# Patient Record
Sex: MALE | Race: OTHER | NOT HISPANIC OR LATINO
[De-identification: names, ages, dates, MRNs, and addresses within clinical notes are randomized per-mention and may not be internally consistent; named-entity substitution may affect disease eponyms.]

---

## 2019-11-04 PROBLEM — Z00.00 ENCOUNTER FOR PREVENTIVE HEALTH EXAMINATION: Status: ACTIVE | Noted: 2019-11-04

## 2019-11-12 ENCOUNTER — APPOINTMENT (OUTPATIENT)
Dept: NEPHROLOGY | Facility: CLINIC | Age: 74
End: 2019-11-12
Payer: MEDICARE

## 2019-11-12 VITALS
RESPIRATION RATE: 16 BRPM | OXYGEN SATURATION: 98 % | HEART RATE: 60 BPM | WEIGHT: 182 LBS | DIASTOLIC BLOOD PRESSURE: 68 MMHG | HEIGHT: 67.5 IN | BODY MASS INDEX: 28.23 KG/M2 | SYSTOLIC BLOOD PRESSURE: 138 MMHG

## 2019-11-12 DIAGNOSIS — N18.3 CHRONIC KIDNEY DISEASE, STAGE 3 (MODERATE): ICD-10-CM

## 2019-11-12 DIAGNOSIS — R80.9 PROTEINURIA, UNSPECIFIED: ICD-10-CM

## 2019-11-12 DIAGNOSIS — I10 ESSENTIAL (PRIMARY) HYPERTENSION: ICD-10-CM

## 2019-11-12 DIAGNOSIS — E11.9 TYPE 2 DIABETES MELLITUS W/OUT COMPLICATIONS: ICD-10-CM

## 2019-11-12 DIAGNOSIS — I38 ENDOCARDITIS, VALVE UNSPECIFIED: ICD-10-CM

## 2019-11-12 DIAGNOSIS — E78.5 HYPERLIPIDEMIA, UNSPECIFIED: ICD-10-CM

## 2019-11-12 PROCEDURE — 99205 OFFICE O/P NEW HI 60 MIN: CPT

## 2019-11-12 RX ORDER — CHROMIUM 200 MCG
1000 TABLET ORAL DAILY
Refills: 0 | Status: ACTIVE | COMMUNITY

## 2019-11-12 NOTE — CONSULT LETTER
[Dear  ___] : Dear  [unfilled], [Consult Letter:] : I had the pleasure of evaluating your patient, [unfilled]. [Consult Closing:] : Thank you very much for allowing me to participate in the care of this patient.  If you have any questions, please do not hesitate to contact me. [Please see my note below.] : Please see my note below. [Sincerely,] : Sincerely, [FreeTextEntry3] : Delroy [DrKaylee  ___] : Dr. MONAE

## 2019-11-12 NOTE — PHYSICAL EXAM
[General Appearance - Alert] : alert [General Appearance - In No Acute Distress] : in no acute distress [Sclera] : the sclera and conjunctiva were normal [Extraocular Movements] : extraocular movements were intact [Neck Appearance] : the appearance of the neck was normal [] : no respiratory distress [Respiration, Rhythm And Depth] : normal respiratory rhythm and effort [Exaggerated Use Of Accessory Muscles For Inspiration] : no accessory muscle use [Auscultation Breath Sounds / Voice Sounds] : lungs were clear to auscultation bilaterally [Heart Rate And Rhythm] : heart rate was normal and rhythm regular [Heart Sounds Gallop] : no gallops [Heart Sounds Pericardial Friction Rub] : no pericardial rub [Heart Sounds] : normal S1 and S2 [Full Pulse] : the pedal pulses are present [Veins - Varicosity Changes] : there were no varicosital changes [Edema] : there was no peripheral edema [Abdomen Soft] : soft [FreeTextEntry1] : bilateral radial pulses 2+ with equal timing [Bowel Sounds] : normal bowel sounds [No CVA Tenderness] : no ~M costovertebral angle tenderness [Abdomen Tenderness] : non-tender [No Spinal Tenderness] : no spinal tenderness [Abnormal Walk] : normal gait [Nail Clubbing] : no clubbing  or cyanosis of the fingernails [Involuntary Movements] : no involuntary movements were seen [Skin Color & Pigmentation] : normal skin color and pigmentation [Skin Turgor] : normal skin turgor [No Focal Deficits] : no focal deficits [Oriented To Time, Place, And Person] : oriented to person, place, and time [Impaired Insight] : insight and judgment were intact [Affect] : the affect was normal [Mood] : the mood was normal

## 2019-11-12 NOTE — ASSESSMENT
[FreeTextEntry1] : Robbie Juarez is a 74-year old male currently referred for the following BUN/creatinine trend: 38/1.4 (07/12/2016),  43/1.3 (01/03/2017), 40/1.4 (07/18/2017), 47/1.5 (11/16/2017), 46/1.6 (09/06/2018), 47/1.6 (04/25/2019), 55/1.8 (10/30/2019).  He has a past medical history significant for hypertension, diabetes mellitus, dyslipidemia, and paroxysmal atrial fibrillation.  He developed endocarditis in January 2017 which "evolved from a dental infection".  It was at that time that he was diagnosed with diabetes mellitus and started on Metformin.  He takes lisinopril and has done so for on the order of 5-6 years.  \par \par IMPRESSION:\par \par (1) Stage III CKD versus the effects of medication and obesity.   The urinalysis of 10/30/2019 was normal as was the urine microalbumin to creatinine ratio.  I can not rule out an 'age' component. \par (2) Proteinuria.  Mr. Juarez had microalbuminuria in the past.  It is controlled with an ACE inhibitor.  Unclear if this was related to the diabetes mellitus, hypertension, or obesity.\par (3) Hypertension.  Mildly greater than goal. There might be a white coat component.\par (4) Overweight.  \par (5) Diabetes mellitus. The recent HbA1c was 7.2%. \par (6) Mild hyperkalemia.\par \par RECOMMENDATIONS:\par \par (1) Renal ultrasound.\par (2) Weight loss. \par (3) Okay to continue lisinopril.\par (4) Given that the eGFR (an underestimation of the true GFR) is > 30 cc per minute (41 cc per minute), it is alright to continue the Metformin.\par (5) We talked about ways to protect the kidneys:\par -Good blood pressure control\par -Avoid the use of NSAIDS (ibuprofen, Motrin, Aleve,Celebrex, etc.).  Okay to use Tylenol.\par -Avoid salt 'like the plague'.  Limit sodium intake.  Do not add salt to your food.\par -Limit the intake of animal products.  Avoid high animal protein diets.\par -Stay well hydrated.\par -Good cholesterol control.\par -Good diabetic control\par (6) Dr. Lowe- please repeat a BMP in around 2-3 months. \par (7) Return in 3 months with the repeat lab studies.

## 2019-11-12 NOTE — HISTORY OF PRESENT ILLNESS
[FreeTextEntry1] : Robbie Juarez is a 74-year old male currently referred for the following BUN/creatinine trend: 38/1.4 (07/12/2016),  43/1.3 (01/03/2017), 40/1.4 (07/18/2017), 47/1.5 (11/16/2017), 46/1.6 (09/06/2018), 47/1.6 (04/25/2019), 55/1.8 (10/30/2019).  He has a past medical history significant for hypertension, diabetes mellitus, dyslipidemia, and paroxysmal atrial fibrillation.  He developed endocarditis in January 2017 which "evolved from a dental infection".  It was at that time that he was diagnosed with diabetes mellitus and started on Metformin.  He takes lisinopril and has done so for on the order of 5-6 years.  Overall, he feels quite well.  He exercises on a regular basis (Elliptical, weights, biking).

## 2019-11-20 ENCOUNTER — TRANSCRIPTION ENCOUNTER (OUTPATIENT)
Age: 74
End: 2019-11-20

## 2019-11-26 ENCOUNTER — TRANSCRIPTION ENCOUNTER (OUTPATIENT)
Age: 74
End: 2019-11-26

## 2019-12-05 ENCOUNTER — TRANSCRIPTION ENCOUNTER (OUTPATIENT)
Age: 74
End: 2019-12-05

## 2020-04-20 ENCOUNTER — APPOINTMENT (OUTPATIENT)
Dept: NEPHROLOGY | Facility: CLINIC | Age: 75
End: 2020-04-20

## 2020-09-18 DIAGNOSIS — Z78.9 OTHER SPECIFIED HEALTH STATUS: ICD-10-CM

## 2020-09-18 DIAGNOSIS — Z86.79 PERSONAL HISTORY OF OTHER DISEASES OF THE CIRCULATORY SYSTEM: ICD-10-CM

## 2020-09-18 DIAGNOSIS — E78.5 HYPERLIPIDEMIA, UNSPECIFIED: ICD-10-CM

## 2020-09-23 ENCOUNTER — APPOINTMENT (OUTPATIENT)
Dept: CARDIOTHORACIC SURGERY | Facility: CLINIC | Age: 75
End: 2020-09-23
Payer: MEDICARE

## 2020-09-23 VITALS
BODY MASS INDEX: 28.41 KG/M2 | RESPIRATION RATE: 16 BRPM | DIASTOLIC BLOOD PRESSURE: 65 MMHG | HEIGHT: 67 IN | TEMPERATURE: 96.4 F | HEART RATE: 57 BPM | OXYGEN SATURATION: 94 % | WEIGHT: 181 LBS | SYSTOLIC BLOOD PRESSURE: 149 MMHG

## 2020-09-23 PROCEDURE — 99205 OFFICE O/P NEW HI 60 MIN: CPT

## 2020-10-01 NOTE — PROCEDURE
[FreeTextEntry1] : Dr. Chapman reviewed the indications for surgery, and used our webpage [email or web address] <[email or web address].org> to illustrate the aorta and anatomy of the heart. Those indications are the following: size greater than 5.0 cm, symptomatic aneurysms, family history of aortic dissection or aneurysm death with a size greater than 4.5 cm, other necessary cardiac procedures such as coronary artery bypass grafting or valvular disorders with an aneurysm greater than 4.5 cm, or connective tissue disorders with an aneurysm size greater than 4.5 cm. The patient does not meet size criteria for surgical intervention at the time.\par \par Dr. Chapman discussed activity restrictions with the patient, and would advise exercise at a moderate amount with no heavy lifting over one third of body weight, and avoiding heart rates that exceed 140 beats per minute. In addition, every patient should abstain from tobacco abuse and to avoid all illicit drug use, especially stimulants such as cocaine or methamphetamine. Dr. Chapman also counseled regarding maintaining a healthy heart diet, and losing any excessive weight as this also put undue stress on both the aorta and entire cardiovascular system. First degree family members should be screened for bicuspid valve disease, and ascending aortic aneurysms. \par \par Patient was advised to view the educational video prior to this visit regarding aortic pathology, risk factors, surgical procedures, and lifestyle modifications. Video can be retrieved at [email or web address].[email or web address]/watch?[email or web address]=[email or web address].\par

## 2020-10-01 NOTE — CONSULT LETTER
[FreeTextEntry2] : Dr. Mukul Morales\par [address] [address] [FreeTextEntry1] : I had the pleasure of seeing your patient, BIBI SWEET, in my office today. We take a multidisciplinary team approach to patient care and consider you, the referring physician, an extension of our team. We will maintain an open line of communication with you throughout your patient's treatment course.  \par \par He is being evaluated for mitral and aortic insufficiency. I have reviewed all of the medical records and diagnostic images at the time of his office visit. I have enclosed a copy for your records. \par \par I have reviewed the indications for surgery,and used our webpage [email or web address] <[email or web address].org> to illustrate the aorta and anatomy of the heart. The patient does not meet criteria for surgical intervention at the time. Therefore, I have recommended that the patient will require a follow up appointment in 3 months with echo to monitor his valvular pathology. My office will assist the patient with his upcoming appointment and I will update you on his progress at that time.\par \par I have discussed with the patient that we will continue to monitor his aortic pathology closely at the [facility] at [facility] that encompasses the entire health care system and is one of the largest in the nation at this point.\par \par It is our commitment to provide your patient with the highest quality of advanced therapeutic options. On behalf of the Cardiothoracic Surgery Team, thank you for sending your patient to the [facility] based at [facility].  If there are any questions or concerns, please call me directly at [phone].   [FreeTextEntry3] : \par Dakota Chapman M.D.\par Professor of Cardiovascular and Thoracic Surgery\par Minimally Invasive Valve Surgeon\par Director of Aortic Surgery, [facility]\par Cell: [phone]\par Email: [email or web address] \par \par [facility]:\par [address], [address] Floor, [place] [address]\par Office: [phone]\par Fax: [phone]\par \par [facility]:\par Department of Cardiovascular and Thoracic Surgery\par [address] [place] [address], [address]\par Office: [phone]\par Fax: [phone]\par \par Practice Manager: Ms. Jossie Coughlin\par Email: [email or web address]\par Phone: [phone]\par \par

## 2020-10-01 NOTE — PHYSICAL EXAM
[General Appearance - Alert] : alert [General Appearance - In No Acute Distress] : in no acute distress [Sclera] : the sclera and conjunctiva were normal [PERRL With Normal Accommodation] : pupils were equal in size, round, and reactive to light [Extraocular Movements] : extraocular movements were intact [Outer Ear] : the ears and nose were normal in appearance [Oropharynx] : the oropharynx was normal [Neck Appearance] : the appearance of the neck was normal [Neck Cervical Mass (___cm)] : no neck mass was observed [Jugular Venous Distention Increased] : there was no jugular-venous distention [Thyroid Diffuse Enlargement] : the thyroid was not enlarged [Thyroid Nodule] : there were no palpable thyroid nodules [Auscultation Breath Sounds / Voice Sounds] : lungs were clear to auscultation bilaterally [Normal Rate] : normal [Rhythm Regular] : regular [Normal S1] : normal S1 [Normal S2] : normal S2 [I] : a grade 1 [II] : a grade 2 [Examination Of The Chest] : the chest was normal in appearance [Chest Visual Inspection Thoracic Asymmetry] : no chest asymmetry [Diminished Respiratory Excursion] : normal chest expansion [No Abnormalities] : the abdominal aorta was not enlarged and no bruit was heard [Bowel Sounds] : normal bowel sounds [Abdomen Soft] : soft [Abdomen Tenderness] : non-tender [Abdomen Mass (___ Cm)] : no abdominal mass palpated [Cervical Lymph Nodes Enlarged Posterior Bilaterally] : posterior cervical [No CVA Tenderness] : no ~M costovertebral angle tenderness [No Spinal Tenderness] : no spinal tenderness [Abnormal Walk] : normal gait [Nail Clubbing] : no clubbing  or cyanosis of the fingernails [Musculoskeletal - Swelling] : no joint swelling seen [Motor Tone] : muscle strength and tone were normal [Skin Color & Pigmentation] : normal skin color and pigmentation [Skin Turgor] : normal skin turgor [] : no rash [Sensation] : the sensory exam was normal to light touch and pinprick [No Focal Deficits] : no focal deficits [Oriented To Time, Place, And Person] : oriented to person, place, and time [Impaired Insight] : insight and judgment were intact [Affect] : the affect was normal [FreeTextEntry1] : deferred

## 2020-10-01 NOTE — HISTORY OF PRESENT ILLNESS
[FreeTextEntry1] : 75 year old male with history of HTN, HLD, A fib on Xarelto (for four years), DM, CKD (Cr 1.3),  and bacterial endocarditis 2015 treated with IV antibiotics, presents for evaluation and management of mitral regurgitation and aortic insufficiency. Patient is referred by Dr. Mukul Morales. \par \par Repeat Echo 9/16/20: LVDd 5.4 EF 64%. Moderate to severe AR. AVpeak/mean 16/6.70mmHg. moderate MR. moderate TR. \par \par Echo 6/10/20: EF 60%, aortic valve trileaflet thickened and calcified with no AS, moderate to severe AR, moderate MR. LVDd 5.4\par \par Patient is doing well and denies recent hospitalization, ER visits, or surgeries. He  denies fever, chills, fatigue, headache, blurred vision, dizziness, syncope, chest pain, palpitations, shortness of breath, orthopnea, paroxysmal nocturnal dyspnea, nausea, vomiting, abdominal pain, back pain, BRBPR or swelling to legs. He is active and bikes 25miles/day. \par

## 2020-10-01 NOTE — ASSESSMENT
[FreeTextEntry1] : 75 year old male with history of HTN, HLD, A fib on Xarelto (for four years), DM, CKD (Cr 1.3),  and bacterial endocarditis 2015 treated with IV antibiotics, presents for evaluation and management of mitral regurgitation and aortic insufficiency. \par \par Repeat Echo 9/16/20: LVDd 5.4 EF 64%. Moderate to severe AR. AVpeak/mean 16/6.70mmHg. moderate MR. moderate TR. \par \par The patient's medical records and diagnostic images were reviewed at the time of this office visit, and the following recommendation was made. Patient is currently physically active and without symptoms. His EF is normal. although LV has dilated compared to last year, it  remains WNL. I have recommended the patient to follow up in 3 months with repeat echocardiogram. \par \par Plan:\par 1. Continue medication regimen\par 2. Follow up with PCP and cardiologist\par 3. BP control- I have recommended the patient to monitor his blood pressure closely. I have also advised the patient to take daily blood pressures at home and adhere to medication regimen.\par 4. Return to the [facility] in three months with echo. \par

## 2020-10-01 NOTE — DATA REVIEWED
[FreeTextEntry1] : As noted in HPI.\par \par \par Echo 4/29/19:\par moderate AR. mild MR. mild TR. LVDd 3.80 EF normal.

## 2020-11-09 ENCOUNTER — NON-APPOINTMENT (OUTPATIENT)
Age: 75
End: 2020-11-09

## 2021-01-25 ENCOUNTER — NON-APPOINTMENT (OUTPATIENT)
Age: 76
End: 2021-01-25

## 2021-01-27 ENCOUNTER — APPOINTMENT (OUTPATIENT)
Dept: CARDIOTHORACIC SURGERY | Facility: CLINIC | Age: 76
End: 2021-01-27

## 2021-02-17 ENCOUNTER — APPOINTMENT (OUTPATIENT)
Dept: CARDIOTHORACIC SURGERY | Facility: CLINIC | Age: 76
End: 2021-02-17

## 2021-03-08 ENCOUNTER — NON-APPOINTMENT (OUTPATIENT)
Age: 76
End: 2021-03-08

## 2021-06-16 ENCOUNTER — APPOINTMENT (OUTPATIENT)
Dept: CARDIOTHORACIC SURGERY | Facility: CLINIC | Age: 76
End: 2021-06-16
Payer: MEDICARE

## 2021-06-16 VITALS
SYSTOLIC BLOOD PRESSURE: 158 MMHG | DIASTOLIC BLOOD PRESSURE: 63 MMHG | RESPIRATION RATE: 17 BRPM | BODY MASS INDEX: 28.25 KG/M2 | HEART RATE: 54 BPM | WEIGHT: 180 LBS | OXYGEN SATURATION: 95 % | HEIGHT: 67 IN | TEMPERATURE: 96.6 F

## 2021-06-16 VITALS
WEIGHT: 180 LBS | RESPIRATION RATE: 17 BRPM | SYSTOLIC BLOOD PRESSURE: 158 MMHG | OXYGEN SATURATION: 95 % | DIASTOLIC BLOOD PRESSURE: 63 MMHG | HEART RATE: 54 BPM | HEIGHT: 67 IN | BODY MASS INDEX: 28.25 KG/M2

## 2021-06-16 DIAGNOSIS — I51.7 CARDIOMEGALY: ICD-10-CM

## 2021-06-16 PROCEDURE — 99214 OFFICE O/P EST MOD 30 MIN: CPT

## 2021-06-18 NOTE — HISTORY OF PRESENT ILLNESS
[FreeTextEntry1] : 75 year old male with a past medical history of HTN, HLD, A fib on Xarelto (for four years), DM, CKD (Cr 1.3),  and bacterial endocarditis 2015 treated with IV antibiotics, presents for evaluation and management of mitral regurgitation and aortic insufficiency. Patient presents today for follow-up visit and discussion of echocardiogram results. \par \par TTE 6/1/21:\par normal EF. moderate to severe left ventricular dilatation. severe aortic regurgitation. cannot exclude bicuspid aortic valve. at least moderate valvular mitral regurgitation, severity may be under estimated. aortic root 3.9cm. \par Compared to 12/3/2020: mitral valve regurgitation worsened. LVID 5.8cm.\par \par Patient is doing well and denies recent hospitalization, ER visits, or surgeries. The patient remains asymptomatic and reports unlimited exercise tolerance consisting of long walks and bike rides. He denies fever, chills, fatigue, headache, blurred vision, dizziness, syncope, chest pain, palpitations, shortness of breath, orthopnea, paroxysmal nocturnal dyspnea, nausea, vomiting, abdominal pain, back pain, BRBPR or swelling to legs.\par \par Covid vaccine -- Pflizer 1/12/21 and 2/2/21.

## 2021-06-18 NOTE — DATA REVIEWED
[FreeTextEntry1] : Echo 9/16/20: LVDd 5.4 EF 64%. Moderate to severe AR. AVpeak/mean 16/6.70mmHg. moderate MR. moderate TR. \par \par Echo 6/10/20:  EF 60% aortic valve trileaflet thickened and calcified with no AS, moderate to severe AR, moderate MR. Moderate TR \par \par Echo 4/29/19:\par moderate AR. mild MR. mild TR. LVDd 3.80 EF normal.

## 2021-06-18 NOTE — CONSULT LETTER
[Dear  ___] : Dear  [unfilled], [FreeTextEntry2] : SHAMA COWAN MD (REF.)\par [address]par [place] [address]\par  [FreeTextEntry1] : \par I had the pleasure of seeing your patient, BIBI SWEET, in my office today. \par \par We take a multidisciplinary team approach to patient care and consider you, the referring physician, an extension of our team. We will maintain an open line of communication with you throughout your patient's treatment course.  \par \par BIBI SWEET  is being evaluated for severe aortic regurgitation and moderate mitral regurgitation. I have reviewed all of the patient's medical records and diagnostic images at the time of his office consultation. I have enclosed a copy for your records. \par \par I have reviewed the indications for surgery,and used our webpage [email or web address] <[email or web address].org> to illustrate the aorta and anatomy of the heart. The patient meets criteria for surgery. I have recommended that the patient is a candidate for an aortic valve replacement, mitral valve repair/replacement, cryomaze, left atrial appendage occlusion atriclip. \par \par Surgery is scheduled for 7/13/21. The patient will be admitted on 7/12/21 for cardiac cath. I will update you on his perioperative status and his disposition upon discharge. \par \par I appreciate the opportunity to care for your patient at the [facility] for [facility] based at [facility]. If there are any questions or concerns, please call me directly at [phone]. \par \par \par Sincerely, \par \par \par \par \par \par \par \par Dakota Chapman M.D.\par Professor of Cardiovascular and Thoracic Surgery\par Minimally Invasive Valve Surgeon\par Director of Aortic Surgery, [facility]\par Cell: [phone]\par Email: [email or web address] \par \par [facility]:\par [address] [place] [address], 4th Floor, [place] [address]\par Office: [phone]\par Fax: [phone]\par \par [facility]:\par Department of Cardiovascular and Thoracic Surgery\par [address] [place] [address], [address]\par Office: [phone]\par Fax: [phone]\par \par Practice Manager: Ms. Jossie Coughlin\par Email: [email or web address] Phone: [phone]

## 2021-06-18 NOTE — END OF VISIT
[Time Spent: ___ minutes] : I have spent [unfilled] minutes of time on the encounter. [FreeTextEntry3] : \par I, LEILANI RAYMUNDOU , am scribing for and in the presence of DERICK LEON the following sections: History of present illness, past Medical/family/surgical/family/social history, review of systems, vital signs, physical exam and disposition.\par \par I personally performed the services described in the documentation, reviewed the documentation recorded by the scribe in my presence and it accurately and completely records my words and actions.\par \par \par

## 2021-06-18 NOTE — PROCEDURE
[FreeTextEntry1] : \par Patient was advised to view the educational video prior to this visit regarding aortic pathology, risk factors, surgical procedures, and lifestyle modifications. Video can be retrieved at [email or web address]/watch?[email or web address].be.\par

## 2021-06-18 NOTE — ASSESSMENT
[FreeTextEntry1] : 75 year old male with a past medical history of HTN, HLD, A fib on Xarelto (for four years), DM, CKD (Cr 1.5),  and bacterial endocarditis 2015 treated with IV antibiotics, presents for evaluation and management of mitral regurgitation and aortic insufficiency. Patient presents today for follow-up visit and discussion of echocardiogram results. \par \par TTE 6/1/21:\par normal EF. moderate to severe left ventricular dilatation. severe aortic regurgitation. cannot exclude bicuspid aortic valve. at least moderate valvular mitral regurgitation, severity may be under estimated. aortic root 3.9cm. \par Compared to 12/3/2020: mitral valve regurgitation worsened. LVID 5.8cm.\par \par Plan: \par \par I have discussed the indications for surgery which include: decrease ejection fraction and worsening aortic stenosis on echocardiogram, signs and symptoms of congestive heart failure, and other necessary cardiac procedures such as coronary artery bypass grafting.\par \par Given the increased dilation of the left ventricle from 3.8cm (2019) to 5.4cm (2020) to now 5.8cm , he  meets those indications. His mitral regurgitation has also worsen since last year. \par \par I had a lengthy discussion with the patient regarding his valvular disease and progression. I have recommended that the patient is a candidate for aortic valve replacement, mitral valve repair/replacement, cryomaze, left atrial appendage occlusion atriclip. \par \par The patient was educated on various valve options.  I have described the mechanical valve prosthesis which does require Coumadin therapy with a daily pill as well as frequent lab tests. The mechanical valve has excellent longevity and is usually only removed in the cases of infective bacterial prosthetic valve endocarditis or pannus formation. Approximately over 90% of mechanical valves never need to be removed. However, there is a risk with Coumadin, a blood thinner, approximately a 1% thromboembolic risk per year. The On-x mechanical valve was also discussed, which requires a lower Coumadin dosage and INR therapeutic range. \par \par The other valve option is a biological valve. In general, approximately 50% of these need to be removed at approximately 15 years. However, these valves do not require Coumadin therapy and, therefore, do not have the associated risks of the blood thinner. I discussed that with the current use of Transcatheter Aortic Valve Replacement (TAVR), there is a possibility that future replacement of a failing bioprosthetic valve by TAVR may be an option. The Inspira and MagnaEase valves and their morphology fitted for future TAVRs was discussed as well. \par \par The patient has chosen a bioprosthesis. \par \par I have discussed the risks, benefits and alternatives to surgery. I have explained the risks of the surgery, including approximately 2% major mortality or morbidity including stroke, infection, bleeding, death, renal failure and heart attack. All questions were addressed and patient agrees to proceed with surgery. \par \par -the patient is a candidate for aortic valve replacement, mitral valve repair/replacement, cryomaze, left atrial appendage occlusion atriclip. \par -patient will be admitted on 7/12 for cardiac cath. OR on 7/13.\par -patient does not require covid swab prior to admission, pt is fully vaccinated. \par -carotid doppler, chest xray, PFT, labs and EKG to be done on admission. \par -patient will require to stop Xarelto 2 days prior to  surgery. Last dose 7/10/21. \par -continue current medication regimen.\par -continue cardiology care with Dr. Mukul Morales.

## 2021-06-18 NOTE — PHYSICAL EXAM
[Sclera] : the sclera and conjunctiva were normal [Neck Appearance] : the appearance of the neck was normal [Neck Cervical Mass (___cm)] : no neck mass was observed [Auscultation Breath Sounds / Voice Sounds] : lungs were clear to auscultation bilaterally [Examination Of The Chest] : the chest was normal in appearance [Chest Visual Inspection Thoracic Asymmetry] : no chest asymmetry [Diminished Respiratory Excursion] : normal chest expansion [No Abnormalities] : the abdominal aorta was not enlarged and no bruit was heard [Bowel Sounds] : normal bowel sounds [Abdomen Soft] : soft [Abdomen Tenderness] : non-tender [Abdomen Mass (___ Cm)] : no abdominal mass palpated [No CVA Tenderness] : no ~M costovertebral angle tenderness [No Spinal Tenderness] : no spinal tenderness [Abnormal Walk] : normal gait [Nail Clubbing] : no clubbing  or cyanosis of the fingernails [Musculoskeletal - Swelling] : no joint swelling seen [Motor Tone] : muscle strength and tone were normal [Skin Color & Pigmentation] : normal skin color and pigmentation [Skin Turgor] : normal skin turgor [] : no rash [Sensation] : the sensory exam was normal to light touch and pinprick [No Focal Deficits] : no focal deficits [Oriented To Time, Place, And Person] : oriented to person, place, and time [Impaired Insight] : insight and judgment were intact [Affect] : the affect was normal [General Appearance - Alert] : alert [General Appearance - In No Acute Distress] : in no acute distress [Outer Ear] : the ears and nose were normal in appearance [Oropharynx] : the oropharynx was normal [Normal Rate] : normal [Rhythm Regular] : regular [Normal S1] : normal S1 [Normal S2] : normal S2 [I] : a grade 1 [II] : a grade 2 [FreeTextEntry1] : deferred

## 2021-06-23 ENCOUNTER — APPOINTMENT (OUTPATIENT)
Dept: CARDIOTHORACIC SURGERY | Facility: CLINIC | Age: 76
End: 2021-06-23

## 2021-06-23 ENCOUNTER — APPOINTMENT (OUTPATIENT)
Dept: CARDIOTHORACIC SURGERY | Facility: CLINIC | Age: 76
End: 2021-06-23
Payer: MEDICARE

## 2021-06-23 DIAGNOSIS — I35.1 NONRHEUMATIC AORTIC (VALVE) INSUFFICIENCY: ICD-10-CM

## 2021-06-23 DIAGNOSIS — I34.0 NONRHEUMATIC MITRAL (VALVE) INSUFFICIENCY: ICD-10-CM

## 2021-06-23 PROCEDURE — 99214 OFFICE O/P EST MOD 30 MIN: CPT

## 2021-06-24 PROBLEM — I34.0 MODERATE MITRAL REGURGITATION: Status: ACTIVE | Noted: 2020-09-18

## 2021-06-24 PROBLEM — I35.1 SEVERE AORTIC REGURGITATION: Status: ACTIVE | Noted: 2020-09-24

## 2021-06-25 NOTE — DATA REVIEWED
[FreeTextEntry1] : TTE 6/1/21:\par normal EF. moderate to severe left ventricular dilatation. severe aortic regurgitation. cannot exclude bicuspid aortic valve. at least moderate valvular mitral regurgitation, severity may be under estimated. aortic root 3.9cm. \par Compared to 12/3/2020: mitral valve regurgitation worsened. LVID 5.8cm.\par \par Echo 9/16/20: LVDd 5.4 EF 64%. Moderate to severe AR. AVpeak/mean 16/6.70mmHg. moderate MR. moderate TR. \par \par Echo 6/10/20:  EF 60% aortic valve trileaflet thickened and calcified with no AS, moderate to severe AR, moderate MR. Moderate TR \par \par Echo 4/29/19:\par moderate AR. mild MR. mild TR. LVDd 3.80 EF normal.

## 2021-06-25 NOTE — ASSESSMENT
[FreeTextEntry1] : 75 year old male with a past medical history of HTN, HLD, A fib on Xarelto (for four years), DM, CKD (Cr 1.5),  and bacterial endocarditis 2015 treated with IV antibiotics, presents for evaluation and management of mitral regurgitation and aortic insufficiency. Patient presents today for follow-up discussion about his surgery.\par \par TTE 6/1/21:\par normal EF. moderate to severe left ventricular dilatation. severe aortic regurgitation. cannot exclude bicuspid aortic valve. at least moderate valvular mitral regurgitation, severity may be under estimated. aortic root 3.9cm. \par Compared to 12/3/2020: mitral valve regurgitation worsened. LVID 5.8cm.\par \par Plan: \par \par I have discussed the indications for surgery which include: decrease ejection fraction and worsening aortic stenosis on echocardiogram, signs and symptoms of congestive heart failure, and other necessary cardiac procedures such as coronary artery bypass grafting.\par \par Given the increased dilation of the left ventricle from 3.8cm (2019) to 5.4cm (2020) to now 5.8cm , he  meets those indications. His mitral regurgitation has also worsen since last year. \par \par I had a lengthy discussion with the patient regarding his valvular disease and progression. I have recommended that the patient is a candidate for aortic valve replacement, mitral valve repair/replacement, cryomaze, left atrial appendage occlusion atriclip. \par \par The patient was educated on various valve options.  I have described the mechanical valve prosthesis which does require Coumadin therapy with a daily pill as well as frequent lab tests. The mechanical valve has excellent longevity and is usually only removed in the cases of infective bacterial prosthetic valve endocarditis or pannus formation. Approximately over 90% of mechanical valves never need to be removed. However, there is a risk with Coumadin, a blood thinner, approximately a 1% thromboembolic risk per year. The On-x mechanical valve was also discussed, which requires a lower Coumadin dosage and INR therapeutic range. \par \par The other valve option is a biological valve. In general, approximately 50% of these need to be removed at approximately 15 years. However, these valves do not require Coumadin therapy and, therefore, do not have the associated risks of the blood thinner. I discussed that with the current use of Transcatheter Aortic Valve Replacement (TAVR), there is a possibility that future replacement of a failing bioprosthetic valve by TAVR may be an option. The Inspira and MagnaEase valves and their morphology fitted for future TAVRs was discussed as well. \par \par The patient has chosen a bioprosthesis. \par \par I have discussed the risks, benefits and alternatives to surgery. I have explained the risks of the surgery, including approximately 2% major mortality or morbidity including stroke, infection, bleeding, death, renal failure and heart attack. All questions were addressed and patient agrees to proceed with surgery. \par \par Plan: \par Dr. Chapman answered all questions regarding his surgery. \par I will call the patient and his wife to discuss details about the admission process in the upcoming week. \par \par \par \par

## 2021-06-25 NOTE — PROCEDURE
[FreeTextEntry1] : \par Patient was advised to view the educational video prior to this visit regarding aortic pathology, risk factors, surgical procedures, and lifestyle modifications. Video can be retrieved at [email or web address].\par

## 2021-06-25 NOTE — END OF VISIT
[FreeTextEntry3] : I, YVON PEREZ , am scribing for and in the presence of DERICK LEON the following sections: History of present illness, past Medical/family/surgical/family/social history, review of systems, vital signs, physical exam and disposition.\par \par I personally performed the services described in the documentation, reviewed the documentation recorded by the scribe in my presence and it accurately and completely records my words and actions.\par \par

## 2021-06-25 NOTE — HISTORY OF PRESENT ILLNESS
[FreeTextEntry1] : 75 year old male with a past medical history of HTN, HLD, A fib on Xarelto (for four years), DM, CKD (Cr 1.3),  and bacterial endocarditis 2015 treated with IV antibiotics, presents for evaluation and management of mitral regurgitation and aortic insufficiency. Patient presents today for follow-up visit to discuss his planned surgery. \par \par

## 2021-06-28 ENCOUNTER — NON-APPOINTMENT (OUTPATIENT)
Age: 76
End: 2021-06-28

## 2021-07-08 VITALS
DIASTOLIC BLOOD PRESSURE: 67 MMHG | HEIGHT: 67 IN | TEMPERATURE: 97 F | OXYGEN SATURATION: 96 % | WEIGHT: 182.1 LBS | SYSTOLIC BLOOD PRESSURE: 177 MMHG | HEART RATE: 54 BPM | RESPIRATION RATE: 16 BRPM

## 2021-07-08 RX ORDER — METFORMIN HYDROCHLORIDE 850 MG/1
0.5 TABLET ORAL
Qty: 0 | Refills: 0 | DISCHARGE

## 2021-07-08 NOTE — H&P ADULT - HISTORY OF PRESENT ILLNESS
[place]  COVID vaccination- Pfizer 2nd dose 2/2/21  Pharmacy:     Pt to be admitted to [facility] for surgery 7/13/21. Pt needs carotid doppler, CXR and PFT's to be done on admission.     74 yo F with PMHx of HTN, HLD, Afib (on Xarelto, last dose ____), DM, CKD III, bacterial endocarditis 2015 treated with IV Abx, moderate MR and severe AI who presented to Dr. Chapman for evaluation of valvular disease. Pt denies fever, chills, cough, CP, palpitations, SOB, PND/orthopnea, abdominal pain, N/V/D, dizziness, syncope.   ECHO 6/1/21: normal EF, moderate to severe LV dilatation, severe AI cannot exclude bicuspid AV, moderate MR. In light of pt's risk factors and known valvular disease pt is referred to [facility] for diagnostic cardiac cath prior to bioprosthetic AVR.    COVID vaccination- Pfizer 2nd dose 2/2/21  Pharmacy: [facility]     Pt to be admitted to [facility] for surgery 7/13/21. Pt needs carotid doppler, CXR and PFT's to be done on admission.   Meds confirmed    74 yo F with PMHx of HTN, HLD, Afib (on Xarelto, last dose 7/10/21), DM, CKD III, bacterial endocarditis 2015 treated with IV Abx, moderate MR and severe AI who presented to Dr. Chapman for evaluation of valvular disease. Pt reports he is very active, he is an avid bike rider without any symptoms. Pt denies fever, chills, cough, CP, palpitations, SOB, PND/orthopnea, abdominal pain, N/V/D, dizziness, syncope. ECHO 6/1/21: normal EF, moderate to severe LV dilatation, severe AI cannot exclude bicuspid AV, moderate MR. In light of pt's risk factors and known valvular disease pt is referred to [facility] for diagnostic cardiac cath prior to bioprosthetic AVR.

## 2021-07-08 NOTE — H&P ADULT - NSICDXPASTSURGICALHX_GEN_ALL_CORE_FT
PAST SURGICAL HISTORY:  S/P rotator cuff repair      PAST SURGICAL HISTORY:  S/P rotator cuff repair     S/P tonsillectomy

## 2021-07-08 NOTE — H&P ADULT - NSHPLABSRESULTS_GEN_ALL_CORE
ECG: sinus selina @ 53 bpm, no acute ST-T changes, QTc 439    Labs:                          12.4   5.99  )-----------( 176      ( 12 Jul 2021 08:39 )             35.9       Auto Neutrophil %: 59.8 % (07-12-21 @ 08:39)  Auto Immature Granulocyte %: 0.3 % (07-12-21 @ 08:39)    07-12    137  |  108  |  37<H>  ----------------------------<  170<H>  4.6   |  22  |  1.49<H>      Calcium, Total Serum: 9.6 mg/dL (07-12-21 @ 08:40)      LFTs:             7.0  | 0.4  | 20       ------------------[53      ( 12 Jul 2021 08:40 )  4.4  | x    | 18               Coags:     11.7   ----< 0.97    ( 12 Jul 2021 08:40 )     27.6        CARDIAC MARKERS ( 12 Jul 2021 08:40 )  x     / x     / 103 U/L / x     / 3.5 ng/mL

## 2021-07-08 NOTE — H&P ADULT - ASSESSMENT
76 yo F with PMHx of HTN, HLD, Afib (on Xarelto, last dose 7/10/21), DM, CKD III, bacterial endocarditis 2015 treated with IV Abx, moderate MR and severe AI who presented to Dr. Chapman for evaluation of valvular disease.  Pt reports he is very active.  Pt is referred to [facility] for diagnostic cardiac cath prior to bioprosthetic AVR.   				  ASA _____				Mallampati class: _________	                Sedation Plan:  Moderate   Patient Is Suitable Candidate For Sedation:  Yes       Risks & benefits of procedure and sedation and risks and benefits for the alternative therapy have been explained to the patient in layman’s terms including but not limited to: allergic reaction, bleeding, infection, arrhythmia, respiratory compromise, renal and vascular compromise, limb damage, MI, CVA, emergent CABG/Vascular Surgery and death.     -Informed consent obtained and in chart.  -IVF: NS @ 75/hr      76 yo F with PMHx of HTN, HLD, Afib (on Xarelto, last dose 7/10/21), DM, CKD III, bacterial endocarditis 2015 treated with IV Abx, moderate MR and severe AI who presented to Dr. Chapman for evaluation of valvular disease.  Pt reports he is very active.  Pt is referred to [facility] for diagnostic cardiac cath prior to bioprosthetic AVR.   				  ASA _____				Mallampati class: _________	                Sedation Plan:  Moderate   Patient Is Suitable Candidate For Sedation:  Yes       Risks & benefits of procedure and sedation and risks and benefits for the alternative therapy have been explained to the patient in layman’s terms including but not limited to: allergic reaction, bleeding, infection, arrhythmia, respiratory compromise, renal and vascular compromise, limb damage, MI, CVA, emergent CABG/Vascular Surgery and death.     -Informed consent obtained and in chart.  -IVF: NS @ 100/hr (Cr 1.49 on arrival -h/o CKD III, pt also took metformin this morning)  -No ASA/Plavix since pt is a pre-op for AVR

## 2021-07-08 NOTE — H&P ADULT - NSICDXPASTMEDICALHX_GEN_ALL_CORE_FT
PAST MEDICAL HISTORY:  Afib     Diabetes mellitus     H/O bacterial endocarditis     Hyperlipidemia     Hypertension     Moderate mitral regurgitation     Severe aortic insufficiency     Stage 3 chronic kidney disease

## 2021-07-08 NOTE — H&P ADULT - NSHPSOCIALHISTORY_GEN_ALL_CORE
Pt denies tobacco use, reports weekly EtOH use Pt denies tobacco use, reports weekly EtOH use, denies drug use.

## 2021-07-12 ENCOUNTER — INPATIENT (INPATIENT)
Facility: HOSPITAL | Age: 76
LOS: 8 days | Discharge: HOME HEALTH | DRG: 217 | End: 2021-07-21
Attending: THORACIC SURGERY (CARDIOTHORACIC VASCULAR SURGERY) | Admitting: THORACIC SURGERY (CARDIOTHORACIC VASCULAR SURGERY)
Payer: MEDICARE

## 2021-07-12 ENCOUNTER — TRANSCRIPTION ENCOUNTER (OUTPATIENT)
Age: 76
End: 2021-07-12

## 2021-07-12 DIAGNOSIS — Z90.89 ACQUIRED ABSENCE OF OTHER ORGANS: Chronic | ICD-10-CM

## 2021-07-12 DIAGNOSIS — Z98.890 OTHER SPECIFIED POSTPROCEDURAL STATES: Chronic | ICD-10-CM

## 2021-07-12 LAB
A1C WITH ESTIMATED AVERAGE GLUCOSE RESULT: 7 % — HIGH (ref 4–5.6)
ALBUMIN SERPL ELPH-MCNC: 4.4 G/DL — SIGNIFICANT CHANGE UP (ref 3.3–5)
ALP SERPL-CCNC: 53 U/L — SIGNIFICANT CHANGE UP (ref 40–120)
ALT FLD-CCNC: 18 U/L — SIGNIFICANT CHANGE UP (ref 10–45)
ANION GAP SERPL CALC-SCNC: 7 MMOL/L — SIGNIFICANT CHANGE UP (ref 5–17)
ANION GAP SERPL CALC-SCNC: 8 MMOL/L — SIGNIFICANT CHANGE UP (ref 5–17)
APTT BLD: 27.6 SEC — SIGNIFICANT CHANGE UP (ref 27.5–35.5)
AST SERPL-CCNC: 20 U/L — SIGNIFICANT CHANGE UP (ref 10–40)
BASOPHILS # BLD AUTO: 0.04 K/UL — SIGNIFICANT CHANGE UP (ref 0–0.2)
BASOPHILS NFR BLD AUTO: 0.7 % — SIGNIFICANT CHANGE UP (ref 0–2)
BILIRUB SERPL-MCNC: 0.4 MG/DL — SIGNIFICANT CHANGE UP (ref 0.2–1.2)
BLD GP AB SCN SERPL QL: NEGATIVE — SIGNIFICANT CHANGE UP
BUN SERPL-MCNC: 36 MG/DL — HIGH (ref 7–23)
BUN SERPL-MCNC: 37 MG/DL — HIGH (ref 7–23)
CALCIUM SERPL-MCNC: 9.3 MG/DL — SIGNIFICANT CHANGE UP (ref 8.4–10.5)
CALCIUM SERPL-MCNC: 9.6 MG/DL — SIGNIFICANT CHANGE UP (ref 8.4–10.5)
CHLORIDE SERPL-SCNC: 108 MMOL/L — SIGNIFICANT CHANGE UP (ref 96–108)
CHLORIDE SERPL-SCNC: 110 MMOL/L — HIGH (ref 96–108)
CHOLEST SERPL-MCNC: 146 MG/DL — SIGNIFICANT CHANGE UP
CK MB CFR SERPL CALC: 3.5 NG/ML — SIGNIFICANT CHANGE UP (ref 0–6.7)
CK SERPL-CCNC: 103 U/L — SIGNIFICANT CHANGE UP (ref 30–200)
CO2 SERPL-SCNC: 21 MMOL/L — LOW (ref 22–31)
CO2 SERPL-SCNC: 22 MMOL/L — SIGNIFICANT CHANGE UP (ref 22–31)
CREAT SERPL-MCNC: 1.31 MG/DL — HIGH (ref 0.5–1.3)
CREAT SERPL-MCNC: 1.49 MG/DL — HIGH (ref 0.5–1.3)
CRP SERPL-MCNC: 0.3 MG/L — SIGNIFICANT CHANGE UP (ref 0–4)
EOSINOPHIL # BLD AUTO: 0.55 K/UL — HIGH (ref 0–0.5)
EOSINOPHIL NFR BLD AUTO: 9.2 % — HIGH (ref 0–6)
ESTIMATED AVERAGE GLUCOSE: 154 MG/DL — HIGH (ref 68–114)
GLUCOSE BLDC GLUCOMTR-MCNC: 129 MG/DL — HIGH (ref 70–99)
GLUCOSE BLDC GLUCOMTR-MCNC: 143 MG/DL — HIGH (ref 70–99)
GLUCOSE BLDC GLUCOMTR-MCNC: 243 MG/DL — HIGH (ref 70–99)
GLUCOSE BLDC GLUCOMTR-MCNC: 256 MG/DL — HIGH (ref 70–99)
GLUCOSE SERPL-MCNC: 170 MG/DL — HIGH (ref 70–99)
GLUCOSE SERPL-MCNC: 256 MG/DL — HIGH (ref 70–99)
HCT VFR BLD CALC: 35.9 % — LOW (ref 39–50)
HDLC SERPL-MCNC: 44 MG/DL — SIGNIFICANT CHANGE UP
HGB BLD-MCNC: 12.4 G/DL — LOW (ref 13–17)
IMM GRANULOCYTES NFR BLD AUTO: 0.3 % — SIGNIFICANT CHANGE UP (ref 0–1.5)
INR BLD: 0.97 — SIGNIFICANT CHANGE UP (ref 0.88–1.16)
LIPID PNL WITH DIRECT LDL SERPL: 81 MG/DL — SIGNIFICANT CHANGE UP
LYMPHOCYTES # BLD AUTO: 1.2 K/UL — SIGNIFICANT CHANGE UP (ref 1–3.3)
LYMPHOCYTES # BLD AUTO: 20 % — SIGNIFICANT CHANGE UP (ref 13–44)
MCHC RBC-ENTMCNC: 30.3 PG — SIGNIFICANT CHANGE UP (ref 27–34)
MCHC RBC-ENTMCNC: 34.5 GM/DL — SIGNIFICANT CHANGE UP (ref 32–36)
MCV RBC AUTO: 87.8 FL — SIGNIFICANT CHANGE UP (ref 80–100)
MONOCYTES # BLD AUTO: 0.6 K/UL — SIGNIFICANT CHANGE UP (ref 0–0.9)
MONOCYTES NFR BLD AUTO: 10 % — SIGNIFICANT CHANGE UP (ref 2–14)
NEUTROPHILS # BLD AUTO: 3.58 K/UL — SIGNIFICANT CHANGE UP (ref 1.8–7.4)
NEUTROPHILS NFR BLD AUTO: 59.8 % — SIGNIFICANT CHANGE UP (ref 43–77)
NON HDL CHOLESTEROL: 102 MG/DL — SIGNIFICANT CHANGE UP
NRBC # BLD: 0 /100 WBCS — SIGNIFICANT CHANGE UP (ref 0–0)
PLATELET # BLD AUTO: 176 K/UL — SIGNIFICANT CHANGE UP (ref 150–400)
POTASSIUM SERPL-MCNC: 4 MMOL/L — SIGNIFICANT CHANGE UP (ref 3.5–5.3)
POTASSIUM SERPL-MCNC: 4.6 MMOL/L — SIGNIFICANT CHANGE UP (ref 3.5–5.3)
POTASSIUM SERPL-SCNC: 4 MMOL/L — SIGNIFICANT CHANGE UP (ref 3.5–5.3)
POTASSIUM SERPL-SCNC: 4.6 MMOL/L — SIGNIFICANT CHANGE UP (ref 3.5–5.3)
PROT SERPL-MCNC: 7 G/DL — SIGNIFICANT CHANGE UP (ref 6–8.3)
PROTHROM AB SERPL-ACNC: 11.7 SEC — SIGNIFICANT CHANGE UP (ref 10.6–13.6)
RBC # BLD: 4.09 M/UL — LOW (ref 4.2–5.8)
RBC # FLD: 12.8 % — SIGNIFICANT CHANGE UP (ref 10.3–14.5)
RH IG SCN BLD-IMP: POSITIVE — SIGNIFICANT CHANGE UP
SODIUM SERPL-SCNC: 137 MMOL/L — SIGNIFICANT CHANGE UP (ref 135–145)
SODIUM SERPL-SCNC: 139 MMOL/L — SIGNIFICANT CHANGE UP (ref 135–145)
TRIGL SERPL-MCNC: 106 MG/DL — SIGNIFICANT CHANGE UP
WBC # BLD: 5.99 K/UL — SIGNIFICANT CHANGE UP (ref 3.8–10.5)
WBC # FLD AUTO: 5.99 K/UL — SIGNIFICANT CHANGE UP (ref 3.8–10.5)

## 2021-07-12 PROCEDURE — 71045 X-RAY EXAM CHEST 1 VIEW: CPT | Mod: 26

## 2021-07-12 PROCEDURE — 93880 EXTRACRANIAL BILAT STUDY: CPT | Mod: 26

## 2021-07-12 PROCEDURE — 93010 ELECTROCARDIOGRAM REPORT: CPT

## 2021-07-12 PROCEDURE — 93458 L HRT ARTERY/VENTRICLE ANGIO: CPT | Mod: 26

## 2021-07-12 PROCEDURE — 94010 BREATHING CAPACITY TEST: CPT | Mod: 26

## 2021-07-12 PROCEDURE — 99152 MOD SED SAME PHYS/QHP 5/>YRS: CPT

## 2021-07-12 RX ORDER — CHLORHEXIDINE GLUCONATE 213 G/1000ML
1 SOLUTION TOPICAL ONCE
Refills: 0 | Status: COMPLETED | OUTPATIENT
Start: 2021-07-12 | End: 2021-07-13

## 2021-07-12 RX ORDER — ASPIRIN/CALCIUM CARB/MAGNESIUM 324 MG
81 TABLET ORAL DAILY
Refills: 0 | Status: DISCONTINUED | OUTPATIENT
Start: 2021-07-12 | End: 2021-07-21

## 2021-07-12 RX ORDER — CHLORHEXIDINE GLUCONATE 213 G/1000ML
1 SOLUTION TOPICAL ONCE
Refills: 0 | Status: COMPLETED | OUTPATIENT
Start: 2021-07-12 | End: 2021-07-12

## 2021-07-12 RX ORDER — HYDRALAZINE HCL 50 MG
5 TABLET ORAL ONCE
Refills: 0 | Status: COMPLETED | OUTPATIENT
Start: 2021-07-12 | End: 2021-07-12

## 2021-07-12 RX ORDER — HEPARIN SODIUM 5000 [USP'U]/ML
5000 INJECTION INTRAVENOUS; SUBCUTANEOUS EVERY 8 HOURS
Refills: 0 | Status: DISCONTINUED | OUTPATIENT
Start: 2021-07-12 | End: 2021-07-15

## 2021-07-12 RX ORDER — ATORVASTATIN CALCIUM 80 MG/1
80 TABLET, FILM COATED ORAL AT BEDTIME
Refills: 0 | Status: DISCONTINUED | OUTPATIENT
Start: 2021-07-12 | End: 2021-07-12

## 2021-07-12 RX ORDER — SOTALOL HCL 120 MG
80 TABLET ORAL DAILY
Refills: 0 | Status: DISCONTINUED | OUTPATIENT
Start: 2021-07-12 | End: 2021-07-12

## 2021-07-12 RX ORDER — PANTOPRAZOLE SODIUM 20 MG/1
40 TABLET, DELAYED RELEASE ORAL
Refills: 0 | Status: DISCONTINUED | OUTPATIENT
Start: 2021-07-12 | End: 2021-07-13

## 2021-07-12 RX ORDER — CHLORHEXIDINE GLUCONATE 213 G/1000ML
1 SOLUTION TOPICAL ONCE
Refills: 0 | Status: COMPLETED | OUTPATIENT
Start: 2021-07-13 | End: 2021-07-13

## 2021-07-12 RX ORDER — DEXTROSE 50 % IN WATER 50 %
12.5 SYRINGE (ML) INTRAVENOUS ONCE
Refills: 0 | Status: DISCONTINUED | OUTPATIENT
Start: 2021-07-12 | End: 2021-07-13

## 2021-07-12 RX ORDER — INSULIN LISPRO 100/ML
VIAL (ML) SUBCUTANEOUS
Refills: 0 | Status: DISCONTINUED | OUTPATIENT
Start: 2021-07-12 | End: 2021-07-13

## 2021-07-12 RX ORDER — DEXTROSE 50 % IN WATER 50 %
25 SYRINGE (ML) INTRAVENOUS ONCE
Refills: 0 | Status: DISCONTINUED | OUTPATIENT
Start: 2021-07-12 | End: 2021-07-13

## 2021-07-12 RX ORDER — ATORVASTATIN CALCIUM 80 MG/1
40 TABLET, FILM COATED ORAL AT BEDTIME
Refills: 0 | Status: DISCONTINUED | OUTPATIENT
Start: 2021-07-12 | End: 2021-07-21

## 2021-07-12 RX ORDER — CHLORHEXIDINE GLUCONATE 213 G/1000ML
10 SOLUTION TOPICAL ONCE
Refills: 0 | Status: COMPLETED | OUTPATIENT
Start: 2021-07-12 | End: 2021-07-13

## 2021-07-12 RX ORDER — SOTALOL HCL 120 MG
80 TABLET ORAL EVERY 12 HOURS
Refills: 0 | Status: DISCONTINUED | OUTPATIENT
Start: 2021-07-12 | End: 2021-07-13

## 2021-07-12 RX ADMIN — Medication 6: at 18:06

## 2021-07-12 RX ADMIN — HEPARIN SODIUM 5000 UNIT(S): 5000 INJECTION INTRAVENOUS; SUBCUTANEOUS at 15:10

## 2021-07-12 RX ADMIN — CHLORHEXIDINE GLUCONATE 1 APPLICATION(S): 213 SOLUTION TOPICAL at 21:15

## 2021-07-12 RX ADMIN — ATORVASTATIN CALCIUM 40 MILLIGRAM(S): 80 TABLET, FILM COATED ORAL at 22:49

## 2021-07-12 RX ADMIN — Medication 5 MILLIGRAM(S): at 18:55

## 2021-07-12 RX ADMIN — CHLORHEXIDINE GLUCONATE 1 APPLICATION(S): 213 SOLUTION TOPICAL at 22:50

## 2021-07-12 RX ADMIN — HEPARIN SODIUM 5000 UNIT(S): 5000 INJECTION INTRAVENOUS; SUBCUTANEOUS at 22:49

## 2021-07-12 NOTE — PROGRESS NOTE ADULT - SUBJECTIVE AND OBJECTIVE BOX
Prelim Cath Report Findings    Non-obstructive CAD  Normal EDP    Angiography  LM: Normal  LAD: Mild luminal irregularities  LCx: Mild luminal irregularities  RCA: Large, dominant, mild luminal irregularities    LHC  No LV gram due to CKD, Echos showed normal LVEF  EDP 11mmHg, no AS    Recs  - CTS management of valvular heart disease

## 2021-07-12 NOTE — PROGRESS NOTE ADULT - SUBJECTIVE AND OBJECTIVE BOX
Planned Date of Surgery: 7/13/21                                                                                                                 Surgeon: Dr. Chapman    Procedure: AVR/MVR, MAZE, ANGELO clip    HPI:  Patient is a 74 y/o female with PMHx of HTN, HLD, Afib (on Xarelto, last dose 7/10/21), DM, CKD III, bacterial endocarditis 2015 treated with IV Abx, moderate MR and severe AI who presented to Dr. Chapman for evaluation of valvular disease.  Pt reports he is very active, he is an avid bike rider without any symptoms.  Pt denies fever, chills, cough, CP, palpitations, SOB, PND/orthopnea, abdominal pain, N/V/D, dizziness, syncope. ECHO 6/1/21: normal EF, moderate to severe LV dilatation, severe AI cannot exclude bicuspid AV, moderate MR. In light of pt's risk factors and known valvular disease pt is referred to [facility] for diagnostic cardiac cath prior to bioprosthetic AVR.     Cardiac cath today revealed no significant CAD.  [facility] with Dr. Chapman MVR/AVR, ANGELO occlusion.      PAST MEDICAL & SURGICAL HISTORY:  Hypertension    Hyperlipidemia    Afib    Diabetes mellitus    Stage 3 chronic kidney disease    H/O bacterial endocarditis    Severe aortic insufficiency    Moderate mitral regurgitation    S/P rotator cuff repair    S/P tonsillectomy        No Known Allergies      Physical Exam  T(C): 36.1 (07-12-21 @ 13:43), Max: 36.1 (07-12-21 @ 13:43)  HR: --  BP: --  RR: --  SpO2: --  Constitutional:  Neuro:  CV:  Pulmonary:  GI:  Extremeties:    MEDICATIONS  (STANDING):  atorvastatin 40 milliGRAM(s) Oral at bedtime  chlorhexidine 4% Liquid 1 Application(s) Topical Once  heparin   Injectable 5000 Unit(s) SubCutaneous every 8 hours  sotalol 80 milliGRAM(s) Oral every 12 hours    MEDICATIONS  (PRN):      On Beta Blocker? YES     Labs:                        12.4   5.99  )-----------( 176      ( 12 Jul 2021 08:39 )             35.9     07-12    137  |  108  |  37<H>  ----------------------------<  170<H>  4.6   |  22  |  1.49<H>    Ca    9.6      12 Jul 2021 08:40    TPro  7.0  /  Alb  4.4  /  TBili  0.4  /  DBili  x   /  AST  20  /  ALT  18  /  AlkPhos  53  07-12    PT/INR - ( 12 Jul 2021 08:40 )   PT: 11.7 sec;   INR: 0.97          PTT - ( 12 Jul 2021 08:40 )  PTT:27.6 sec        CARDIAC MARKERS ( 12 Jul 2021 08:40 )  x     / x     / 103 U/L / x     / 3.5 ng/mL      Hgb A1C: 7%    EKG:     CXR: Pending    Cath Report: No significant CAD      S/p diagnostic cardiac cath 7/12/21 with Dr. Perea: LM normal, LAD mild disease, LCx mild  disease, RCA dominant, mild luminal irregularities, No LV gram performed. EDP 10mmHg, No AS, R Radial TR x 2 @ 2:15PM     Pt received total 500 cc fluids intraprocedure  Total contrast 30 cc     Echo: Severe AI, can't r/o bicuspid valve, moderate - severe MR.  Paper report on file.     PFT's: Pending    Carotid Duplex: Pending    Consult in Chart?  YES (office consultation)   Consent in Chart? YES   Pre-op Orders Placed? YES   Blood Prodeucts Ordered? YES  NPO ordered? YES  Planned Date of Surgery: 7/13/21                                                                                                                 Surgeon: Dr. Chapman    Procedure: AVR/MVR, MAZE, ANGELO clip    HPI:  Patient is a 74 y/o female with PMHx of HTN, HLD, Afib (on Xarelto, last dose 7/10/21), DM, CKD III, bacterial endocarditis 2015 treated with IV Abx, moderate MR and severe AI who presented to Dr. Chapman for evaluation of valvular disease.  Pt reports he is very active, he is an avid bike rider without any symptoms.  Pt denies fever, chills, cough, CP, palpitations, SOB, PND/orthopnea, abdominal pain, N/V/D, dizziness, syncope. ECHO 6/1/21: normal EF, moderate to severe LV dilatation, severe AI cannot exclude bicuspid AV, moderate MR. In light of pt's risk factors and known valvular disease pt is referred to [facility] for diagnostic cardiac cath prior to bioprosthetic AVR.     Cardiac cath today revealed no significant CAD.  He will be admitted to [facility] for surgery tomorrow with Dr. Chapman MVR/AVR, ANGELO occlusion.      PAST MEDICAL & SURGICAL HISTORY:  Hypertension    Hyperlipidemia    Afib    Diabetes mellitus    Stage 3 chronic kidney disease    H/O bacterial endocarditis    Severe aortic insufficiency    Moderate mitral regurgitation    S/P rotator cuff repair    S/P tonsillectomy        No Known Allergies      Physical Exam  T(C): 36.1 (07-12-21 @ 13:43), Max: 36.1 (07-12-21 @ 13:43)  139/84 BP  52 SB  99% room air  18 RR    GEN: NAD, looks comfortable  Psych: Mood appropriate  Neuro: A&Ox3.  No focal deficits.  Moving all extremities.   HEENT: No obvious abnormalities  CV: S1S2, regular, +holosystolic murmur heard best LSB.  No carotid bruits.  No JVD  Lungs: Clear B/L.  No wheezing, rales or rhonchi  ABD: Soft, non-tender, non-distended.  +Bowel sounds  EXT: Warm and well perfused.  No peripheral edema noted  Musculoskeletal: Moving all extremities with normal ROM, no joint swelling  PV: Pedal pulses palpable      MEDICATIONS  (STANDING):  atorvastatin 40 milliGRAM(s) Oral at bedtime  chlorhexidine 4% Liquid 1 Application(s) Topical Once  heparin   Injectable 5000 Unit(s) SubCutaneous every 8 hours  sotalol 80 milliGRAM(s) Oral every 12 hours    MEDICATIONS  (PRN):      On Beta Blocker? YES     Labs:                        12.4   5.99  )-----------( 176      ( 12 Jul 2021 08:39 )             35.9     07-12    137  |  108  |  37<H>  ----------------------------<  170<H>  4.6   |  22  |  1.49<H>    Ca    9.6      12 Jul 2021 08:40    TPro  7.0  /  Alb  4.4  /  TBili  0.4  /  DBili  x   /  AST  20  /  ALT  18  /  AlkPhos  53  07-12    PT/INR - ( 12 Jul 2021 08:40 )   PT: 11.7 sec;   INR: 0.97          PTT - ( 12 Jul 2021 08:40 )  PTT:27.6 sec        CARDIAC MARKERS ( 12 Jul 2021 08:40 )  x     / x     / 103 U/L / x     / 3.5 ng/mL      Hgb A1C: 7%    EKG: SB 49    CXR: Pending    Cath Report: No significant CAD      S/p diagnostic cardiac cath 7/12/21 with Dr. Perea: LM normal, LAD mild disease, LCx mild  disease, RCA dominant, mild luminal irregularities, No LV gram performed. EDP 10mmHg, No AS, R Radial TR x 2 @ 2:15PM     Pt received total 500 cc fluids intraprocedure  Total contrast 30 cc     Echo: Severe AI, can't r/o bicuspid valve, moderate - severe MR.  Paper report on file.     PFT's: Pending    Carotid Duplex: Pending    Consult in Chart?  YES (office consultation)   Consent in Chart? YES   Pre-op Orders Placed? YES   Blood Prodeucts Ordered? YES  NPO ordered? YES

## 2021-07-12 NOTE — CONSULT NOTE ADULT - SUBJECTIVE AND OBJECTIVE BOX
Surgeon:    Requesting Physician:    HISTORY OF PRESENT ILLNESS (Need 4):  75y Male    PAST MEDICAL & SURGICAL HISTORY:  Hypertension    Hyperlipidemia    Afib    Diabetes mellitus    Stage 3 chronic kidney disease    H/O bacterial endocarditis    Severe aortic insufficiency    Moderate mitral regurgitation    S/P rotator cuff repair    S/P tonsillectomy        MEDICATIONS  (STANDING):  aspirin enteric coated 81 milliGRAM(s) Oral daily  atorvastatin 40 milliGRAM(s) Oral at bedtime  chlorhexidine 0.12% Liquid 10 milliLiter(s) Swish and Spit once  chlorhexidine 4% Liquid 1 Application(s) Topical Once  chlorhexidine 4% Liquid 1 Application(s) Topical once  chlorhexidine 4% Liquid 1 Application(s) Topical once  heparin   Injectable 5000 Unit(s) SubCutaneous every 8 hours  pantoprazole    Tablet 40 milliGRAM(s) Oral before breakfast  sotalol 80 milliGRAM(s) Oral every 12 hours    MEDICATIONS  (PRN):      Allergies    No Known Allergies    Intolerances        SOCIAL HISTORY:  Smoker:  YES / NO        PACK YEARS:                         WHEN QUIT?  ETOH use:  YES / NO               FREQUENCY / QUANTITY:  Ilicit Drug use:  YES / NO  Occupation:  Assisted device use (Cane / Walker):  Live with:    FAMILY HISTORY:  No pertinent family history in first degree relatives        Review of Systems (Need 10):  CONSTITUTIONAL: Denies fevers / chills, sweats, fatigue, weight loss, weight gain                                       NEURO:  Denies parathesias, seizures, syncope, confusion                                                                                  EYES:  Denies blurry vision, discharge, pain, loss of vision                                                                                    ENMT:  Denies difficulty hearing, vertigo, dysphagia, epistaxis, recent dental work                                       CV:  Denies chest pain, palpitations, OCASIO, orthopnea                                                                                           RESPIRATORY:  Denies wWheezing, SOB, cough / sputum, hemoptysis                                                               GI:  Denies nausea, vomiting, diarrhea, constipation, melena                                                                          : Denies hematuria, dysuria, urgency, incontinence                                                                                          MUSKULOSKELETAL:  Denies arthritis, joint swelling, muscle weakness                                                             SKIN/BREAST:  Denies rash, itching, hair loss, masses                                                                                              PSYCH:  Denies depression, anxiety, suicidal ideation                                                                                                HEME/LYMPH:  Denies bruises easily, enlarged lymph nodes, tender lymph nodes                                          ENDOCRINE:  Denies cold intolerance, heat intolerance, polydipsia                                                                      Vital Signs Last 24 Hrs  T(C): 36.1 (12 Jul 2021 13:43), Max: 36.1 (12 Jul 2021 13:32)  T(F): 97 (12 Jul 2021 13:43), Max: 97 (12 Jul 2021 13:32)  HR: 52 (12 Jul 2021 13:32) (52 - 52)  BP: 139/84 (12 Jul 2021 13:32) (139/84 - 139/84)  BP(mean): 106 (12 Jul 2021 13:32) (106 - 106)  RR: 18 (12 Jul 2021 13:32) (18 - 18)  SpO2: 99% (12 Jul 2021 13:32) (99% - 99%)    Physical Exam (Need 8)  CONSTITUTIONAL:                                                                          WNL  NEURO:                                                                                             WNL                      EYES:                                                                                                  WNL  ENMT:                                                                                                WNL  CV:                                                                                                      WNL  RESPIRATORY:                                                                                  WNL  GI:                                                                                                       WNL  : PARIKH + / -                                                                                 WNL  MUSKULOSKELETAL:                                                                       WNL  SKIN / BREAST:                                                                                 WNL                                                          LABS:                        12.4   5.99  )-----------( 176      ( 12 Jul 2021 08:39 )             35.9     07-12    137  |  108  |  37<H>  ----------------------------<  170<H>  4.6   |  22  |  1.49<H>    Ca    9.6      12 Jul 2021 08:40    TPro  7.0  /  Alb  4.4  /  TBili  0.4  /  DBili  x   /  AST  20  /  ALT  18  /  AlkPhos  53  07-12    PT/INR - ( 12 Jul 2021 08:40 )   PT: 11.7 sec;   INR: 0.97          PTT - ( 12 Jul 2021 08:40 )  PTT:27.6 sec    CARDIAC MARKERS ( 12 Jul 2021 08:40 )  x     / x     / 103 U/L / x     / 3.5 ng/mL          RADIOLOGY & ADDITIONAL STUDIES:  CAROTID U/S:    CXR:    CT Scan:    EKG:    TTE / CARO:    Cardiac Cath: Surgeon:    Requesting Physician:    HISTORY OF PRESENT ILLNESS (Need 4):  75y Male PMHx of HTN, HLD, Afib (on Xarelto, last dose 7/10/21), DM, CKD III, bacterial endocarditis 2015 treated with IV Abx, moderate MR and severe AI who presented to Dr. Chapman for evaluation of valvular disease.  Pt reports he is very active riding his bike and is "completely asymptomatic."  Pt is referred to [facility] for diagnostic cardiac cath on 7/12/21 prior to bioprosthetic AVR scheduled for 7/13/21. Denies CP, palpitations, syncope, SOB, orthopnea, OCASIO, intermittent claudication, lower extremity edema.    PAST MEDICAL & SURGICAL HISTORY:  Hypertension    Hyperlipidemia    Afib    Diabetes mellitus    Stage 3 chronic kidney disease    H/O bacterial endocarditis    Severe aortic insufficiency    Moderate mitral regurgitation    S/P rotator cuff repair    S/P tonsillectomy        MEDICATIONS  (STANDING):  aspirin enteric coated 81 milliGRAM(s) Oral daily  atorvastatin 40 milliGRAM(s) Oral at bedtime  chlorhexidine 0.12% Liquid 10 milliLiter(s) Swish and Spit once  chlorhexidine 4% Liquid 1 Application(s) Topical Once  chlorhexidine 4% Liquid 1 Application(s) Topical once  chlorhexidine 4% Liquid 1 Application(s) Topical once  heparin   Injectable 5000 Unit(s) SubCutaneous every 8 hours  pantoprazole    Tablet 40 milliGRAM(s) Oral before breakfast  sotalol 80 milliGRAM(s) Oral every 12 hours    MEDICATIONS  (PRN):      Allergies    No Known Allergies    Intolerances    No known intolerances    SOCIAL HISTORY:  Smoker: NO, never         ETOH use:  YES, socially               FREQUENCY / QUANTITY: 3 glasses of wine per week  Illicit Drug use:  denies  Occupation: retired  Assisted device use (Cane / Walker): none  Lives with: wife    FAMILY HISTORY:  No pertinent cardiac family history in first degree relatives.        Review of Systems (Need 10):  CONSTITUTIONAL: Denies fevers / chills, sweats, fatigue, weight loss, weight gain                                       NEURO:  Denies parathesias, seizures, syncope, confusion                                                                                  EYES:  Denies blurry vision, discharge, pain, loss of vision                                                                                    ENMT:  Denies difficulty hearing, vertigo, dysphagia, epistaxis, recent dental work                                       CV:  Denies chest pain, palpitations, OCASIO, orthopnea                                                                                           RESPIRATORY:  Denies Wheezing, SOB, cough / sputum, hemoptysis                                                               GI:  Denies nausea, vomiting, diarrhea, constipation, melena                                                                          : Denies hematuria, dysuria, urgency, incontinence                                                                                          MUSKULOSKELETAL:  Denies arthritis, joint swelling, muscle weakness                                                             SKIN/BREAST:  Denies rash, itching, hair loss, masses                                                                                              PSYCH:  Denies depression, anxiety, suicidal ideation                                                                                                HEME/LYMPH:  Denies bruises easily, enlarged lymph nodes, tender lymph nodes                                          ENDOCRINE:  Denies cold intolerance, heat intolerance, polydipsia                                                                      Vital Signs Last 24 Hrs  T(C): 36.1 (12 Jul 2021 13:43), Max: 36.1 (12 Jul 2021 13:32)  T(F): 97 (12 Jul 2021 13:43), Max: 97 (12 Jul 2021 13:32)  HR: 52 (12 Jul 2021 13:32) (52 - 52)  BP: 139/84 (12 Jul 2021 13:32) (139/84 - 139/84)  BP(mean): 106 (12 Jul 2021 13:32) (106 - 106)  RR: 18 (12 Jul 2021 13:32) (18 - 18)  SpO2: 99% (12 Jul 2021 13:32) (99% - 99%)    Physical Exam (Need 8)  CONSTITUTIONAL:                                                                          WNL  NEURO:                                                                                             WNL                      EYES:                                                                                                  WNL  ENMT:                                                                                                WNL  CV:                                                                                                      WNL  RESPIRATORY:                                                                                  WNL  GI:                                                                                                       WNL  MUSKULOSKELETAL:                                                                       WNL  SKIN / BREAST:                                                                                 WNL                                                          LABS:                        12.4   5.99  )-----------( 176      ( 12 Jul 2021 08:39 )             35.9     07-12    137  |  108  |  37<H>  ----------------------------<  170<H>  4.6   |  22  |  1.49<H>    Ca    9.6      12 Jul 2021 08:40    TPro  7.0  /  Alb  4.4  /  TBili  0.4  /  DBili  x   /  AST  20  /  ALT  18  /  AlkPhos  53  07-12    PT/INR - ( 12 Jul 2021 08:40 )   PT: 11.7 sec;   INR: 0.97          PTT - ( 12 Jul 2021 08:40 )  PTT:27.6 sec    CARDIAC MARKERS ( 12 Jul 2021 08:40 )  x     / x     / 103 U/L / x     / 3.5 ng/mL          RADIOLOGY & ADDITIONAL STUDIES:  CAROTID U/S:    CXR:    CT Scan:    EKG:    TTE / CARO:    Cardiac Cath:  Surgeon:    Requesting Physician:    HISTORY OF PRESENT ILLNESS (Need 4):  75y Male PMHx of HTN, HLD, Afib (on Xarelto, last dose 7/10/21), DM, CKD III, bacterial endocarditis 2015 treated with IV Abx, moderate MR and severe AI who presented to Dr. Chapman for evaluation of valvular disease.  Pt reports he is very active riding his bike and is "completely asymptomatic."  Pt is referred to [facility] for diagnostic cardiac cath on 7/12/21 prior to bioprosthetic AVR scheduled for 7/13/21. Denies CP, palpitations, syncope, SOB, orthopnea, OCASIO, intermittent claudication, lower extremity edema.    PAST MEDICAL & SURGICAL HISTORY:  Hypertension    Hyperlipidemia    Afib    Diabetes mellitus    Stage 3 chronic kidney disease    H/O bacterial endocarditis    Severe aortic insufficiency    Moderate mitral regurgitation    S/P rotator cuff repair    S/P tonsillectomy        MEDICATIONS  (STANDING):  aspirin enteric coated 81 milliGRAM(s) Oral daily  atorvastatin 40 milliGRAM(s) Oral at bedtime  chlorhexidine 0.12% Liquid 10 milliLiter(s) Swish and Spit once  chlorhexidine 4% Liquid 1 Application(s) Topical Once  chlorhexidine 4% Liquid 1 Application(s) Topical once  chlorhexidine 4% Liquid 1 Application(s) Topical once  heparin   Injectable 5000 Unit(s) SubCutaneous every 8 hours  pantoprazole    Tablet 40 milliGRAM(s) Oral before breakfast  sotalol 80 milliGRAM(s) Oral every 12 hours    MEDICATIONS  (PRN):      Allergies    No Known Allergies    Intolerances    No known intolerances    SOCIAL HISTORY:  Smoker: NO, never         ETOH use:  YES, socially               FREQUENCY / QUANTITY: 3 glasses of wine per week  Illicit Drug use:  denies  Occupation: retired  Assisted device use (Cane / Walker): none  Lives with: wife    FAMILY HISTORY:  No pertinent cardiac family history in first degree relatives.        Review of Systems (Need 10):  CONSTITUTIONAL: Denies fevers / chills, sweats, fatigue, weight loss, weight gain                                       NEURO:  Denies parathesias, seizures, syncope, confusion                                                                                  EYES:  Denies blurry vision, discharge, pain, loss of vision                                                                                    ENMT:  Denies difficulty hearing, vertigo, dysphagia, epistaxis, recent dental work                                       CV:  Denies chest pain, palpitations, OCASIO, orthopnea                                                                                           RESPIRATORY:  Denies Wheezing, SOB, cough / sputum, hemoptysis                                                               GI:  Denies nausea, vomiting, diarrhea, constipation, melena                                                                          : Denies hematuria, dysuria, urgency, incontinence                                                                                          MUSKULOSKELETAL:  Denies arthritis, joint swelling, muscle weakness                                                             SKIN/BREAST:  Denies rash, itching, hair loss, masses                                                                                              PSYCH:  Denies depression, anxiety, suicidal ideation                                                                                                HEME/LYMPH:  Denies bruises easily, enlarged lymph nodes, tender lymph nodes                                          ENDOCRINE:  Denies cold intolerance, heat intolerance, polydipsia                                                                      Vital Signs Last 24 Hrs  T(C): 36.1 (12 Jul 2021 13:43), Max: 36.1 (12 Jul 2021 13:32)  T(F): 97 (12 Jul 2021 13:43), Max: 97 (12 Jul 2021 13:32)  HR: 52 (12 Jul 2021 13:32) (52 - 52)  BP: 139/84 (12 Jul 2021 13:32) (139/84 - 139/84)  BP(mean): 106 (12 Jul 2021 13:32) (106 - 106)  RR: 18 (12 Jul 2021 13:32) (18 - 18)  SpO2: 99% (12 Jul 2021 13:32) (99% - 99%)    Physical Exam (Need 8)  CONSTITUTIONAL:                                                                          WNL  NEURO:                                                                                             WNL                      EYES:                                                                                                  WNL  ENMT:                                                                                                WNL  CV:                                                                                                      WNL  RESPIRATORY:                                                                                  WNL  GI:                                                                                                       WNL  :                                                                                       Not examined  MUSKULOSKELETAL:                                                                       WNL  SKIN / BREAST:                                                                                 WNL                                               LABS:                        12.4   5.99  )-----------( 176      ( 12 Jul 2021 08:39 )             35.9     07-12    137  |  108  |  37<H>  ----------------------------<  170<H>  4.6   |  22  |  1.49<H>    Ca    9.6      12 Jul 2021 08:40    TPro  7.0  /  Alb  4.4  /  TBili  0.4  /  DBili  x   /  AST  20  /  ALT  18  /  AlkPhos  53  07-12    PT/INR - ( 12 Jul 2021 08:40 )   PT: 11.7 sec;   INR: 0.97          PTT - ( 12 Jul 2021 08:40 )  PTT:27.6 sec    CARDIAC MARKERS ( 12 Jul 2021 08:40 )  x     / x     / 103 U/L / x     / 3.5 ng/mL          RADIOLOGY & ADDITIONAL STUDIES:  CAROTID U/S:    CXR:    CT Scan:    EKG:    TTE / CARO:    Cardiac Cath:  Surgeon:    Requesting Physician:    HISTORY OF PRESENT ILLNESS (Need 4):  75y Male PMHx of HTN, HLD, Afib (on Xarelto, last dose 7/10/21), DM, CKD III, bacterial endocarditis 2015 treated with IV Abx, moderate MR and severe AI who presented to Dr. Chapman for evaluation of valvular disease.  Pt reports he is very active riding his bike and is "completely asymptomatic."  Pt is referred to [facility] for diagnostic cardiac cath which showed no significant disease on 7/12/21 prior to bioprosthetic AVR. Denies n/v, dizziness, CP, palpitations, syncope, SOB, orthopnea, OCASIO, intermittent claudication, lower extremity edema. Patient admitted to [facility] for AVR, MVR, cryomaze, and ANGELO clip on 7/13/21 with Dr. Chapman.    PAST MEDICAL & SURGICAL HISTORY:  Hypertension    Hyperlipidemia    Afib    Diabetes mellitus    Stage 3 chronic kidney disease    H/O bacterial endocarditis    Severe aortic insufficiency    Moderate mitral regurgitation    S/P rotator cuff repair    S/P tonsillectomy        MEDICATIONS  (STANDING):  aspirin enteric coated 81 milliGRAM(s) Oral daily  atorvastatin 40 milliGRAM(s) Oral at bedtime  chlorhexidine 0.12% Liquid 10 milliLiter(s) Swish and Spit once  chlorhexidine 4% Liquid 1 Application(s) Topical Once  chlorhexidine 4% Liquid 1 Application(s) Topical once  chlorhexidine 4% Liquid 1 Application(s) Topical once  heparin   Injectable 5000 Unit(s) SubCutaneous every 8 hours  pantoprazole    Tablet 40 milliGRAM(s) Oral before breakfast  sotalol 80 milliGRAM(s) Oral every 12 hours    MEDICATIONS  (PRN):      Allergies    No Known Allergies    Intolerances    No known intolerances    SOCIAL HISTORY:  Smoker: NO, never         ETOH use:  YES, socially               FREQUENCY / QUANTITY: 3 glasses of wine per week  Illicit Drug use:  denies  Occupation: retired  Assisted device use (Cane / Walker): none  Lives with: wife    FAMILY HISTORY:  No pertinent cardiac family history in first degree relatives.        Review of Systems (Need 10):  CONSTITUTIONAL: Denies fevers / chills, sweats, fatigue, weight loss, weight gain                                       NEURO:  Denies parathesias, seizures, syncope, confusion                                                                                  EYES:  Denies blurry vision, discharge, pain, loss of vision                                                                                    ENMT:  Denies difficulty hearing, vertigo, dysphagia, epistaxis, recent dental work                                       CV:  Denies chest pain, palpitations, OCASIO, orthopnea                                                                                           RESPIRATORY:  Denies Wheezing, SOB, cough / sputum, hemoptysis                                                               GI:  Denies nausea, vomiting, diarrhea, constipation, melena                                                                          : Denies hematuria, dysuria, urgency, incontinence                                                                                          MUSKULOSKELETAL:  Denies arthritis, joint swelling, muscle weakness                                                             SKIN/BREAST:  Denies rash, itching, hair loss, masses                                                                                              PSYCH:  Denies depression, anxiety, suicidal ideation                                                                                                HEME/LYMPH:  Denies bruises easily, enlarged lymph nodes, tender lymph nodes                                          ENDOCRINE:  Denies cold intolerance, heat intolerance, polydipsia                                                                      Vital Signs Last 24 Hrs  T(C): 36.1 (12 Jul 2021 13:43), Max: 36.1 (12 Jul 2021 13:32)  T(F): 97 (12 Jul 2021 13:43), Max: 97 (12 Jul 2021 13:32)  HR: 52 (12 Jul 2021 13:32) (52 - 52)  BP: 139/84 (12 Jul 2021 13:32) (139/84 - 139/84)  BP(mean): 106 (12 Jul 2021 13:32) (106 - 106)  RR: 18 (12 Jul 2021 13:32) (18 - 18)  SpO2: 99% (12 Jul 2021 13:32) (99% - 99%)    Physical Exam (Need 8)  CONSTITUTIONAL:                                                                          WNL  NEURO:                                                                                             WNL                      EYES:                                                                                                  WNL  ENMT:                                                                                                WNL  CV:                                                                                                      WNL  RESPIRATORY:                                                                                  WNL  GI:                                                                                                       WNL  :                                                                                       Not examined  MUSKULOSKELETAL:                                                                       WNL  SKIN / BREAST:                                                                                 WNL                                               LABS:                        12.4   5.99  )-----------( 176      ( 12 Jul 2021 08:39 )             35.9     07-12    137  |  108  |  37<H>  ----------------------------<  170<H>  4.6   |  22  |  1.49<H>    Ca    9.6      12 Jul 2021 08:40    TPro  7.0  /  Alb  4.4  /  TBili  0.4  /  DBili  x   /  AST  20  /  ALT  18  /  AlkPhos  53  07-12    PT/INR - ( 12 Jul 2021 08:40 )   PT: 11.7 sec;   INR: 0.97          PTT - ( 12 Jul 2021 08:40 )  PTT:27.6 sec    CARDIAC MARKERS ( 12 Jul 2021 08:40 )  x     / x     / 103 U/L / x     / 3.5 ng/mL          RADIOLOGY & ADDITIONAL STUDIES:  Hgb A1C: 7%    EKG:     CXR: Pending    Cath Report:   No significant CAD    Diagnostic cardiac cath 7/12/21 with Dr. Perea: LM normal, LAD mild disease, LCx mild  disease, RCA dominant, mild luminal irregularities, No LV gram performed.     Echo: Severe AI, can't r/o bicuspid valve, moderate - severe MR.  Paper report on file.     PFT's: Pending    Carotid Duplex: Pending

## 2021-07-12 NOTE — CONSULT NOTE ADULT - ASSESSMENT
Neurovascular:   No delirium. Pain well controlled with current regimen.    Cardiovascular:   Hemodynamically stable. HR controlled    CAD  -continue aspirin and Plavix for acute graft patency,  -continue statin for long term graft patency  BP  -continue _____ , for BP control       Respiratory:   02 Sat = 98% on RA.  -Wean to RA from for O2 Sat > 93%.  -Encourage Cough, deep breathing and Use of IS 10x / hr while awake.  -Chest PT 4xdaily    GI:   Stable  -protonix for GI protection  -Miralax for bowl regemin  -PO DASH diet  -Last BM:    Renal / :   BUN/Cr Stable  -Continue to monitor I/O's.    Endocrine:    Blood sugar stable          Hematologic:  H/H stable  -DVT prophylaxis with Heparin sq    ID:  -Afebrile  -Continue to observe for SIRS/Sepsis Syndrome.    Disposition:  Possible home Neurovascular:   No delirium. Denies pain.    Cardiovascular:   Hemodynamically stable. HR bradycardic 48-50's.    CAD  -continue aspirin and Plavix for acute graft patency,  -continue statin for long term graft patency    BP  -d/c sotalol, start metoprolol 12.5mg BID (?) , for BP control    Respiratory:   02 Sat = 98% on RA.  -Wean to RA from for O2 Sat > 93%.  -Encourage Cough, deep breathing and Use of IS 10x / hr while awake.  -Chest PT 4xdaily    GI:   Stable  -protonix for GI protection  -Miralax for bowl regimen  -PO DASH diet  -Last BM: 7/12/21 AM    Renal / :   BUN/Cr elevated, but stable, in setting of CKD stage 3.  -Continue to monitor I/O's.  - avoid renal toxic medications.    Endocrine:    Blood sugar stable   - sliding scale insulin    Hematologic:  H/H stable  -DVT prophylaxis with Heparin sq    ID:  -Afebrile  -Continue to observe for SIRS/Sepsis Syndrome.    Disposition:  - Home after surgery and recovery 75y Male PMHx of HTN, HLD, Afib (on Xarelto, last dose 7/10/21), DM, CKD III, bacterial endocarditis 2015 treated with IV Abx, moderate MR and severe AI who presented to Dr. Chapman for evaluation of valvular disease.  Pt reports he is very active riding his bike and is "completely asymptomatic."  Pt is referred to [facility] for diagnostic cardiac cath on 7/12/21 prior to bioprosthetic AVR scheduled for 7/13/21. Denies CP, palpitations, syncope, SOB, orthopnea, OCASIO, intermittent claudication, lower extremity edema.    Neurovascular:   No delirium. Denies pain.    Cardiovascular:   Hemodynamically stable. HR bradycardic 48-50's.    CAD  -continue aspirin and Plavix for acute graft patency,  -continue statin for long term graft patency  -carotid doppler US 7/13  -CTSX scheduled 7/13: AVR, MVR, ?? ablation, left leaflet clip  -preop labs ordered  -consent signed and in chart  -benefits, risks, and alternatives discussed with patient    BP  -d/c sotalol, start metoprolol 12.5mg BID (?) , for BP control    Respiratory:   02 Sat = 98% on RA.  -Wean to RA from for O2 Sat > 93%.  -Encourage Cough, deep breathing and Use of IS 10x / hr while awake.  -Chest PT 4xdaily    GI:   Stable  -protonix for GI protection  -Miralax for bowl regimen  -PO DASH diet  -Last BM: 7/12/21 AM    Renal / :   BUN/Cr elevated, but stable, in setting of CKD stage 3.  -Continue to monitor I/O's.  - avoid renal toxic medications.    Endocrine:    Blood sugar stable   - sliding scale insulin    Hematologic:  H/H stable  -DVT prophylaxis with Heparin sq    ID:  -Afebrile  -Continue to observe for SIRS/Sepsis Syndrome.    Disposition:  - Home after surgery and recovery 75y Male PMHx of HTN, HLD, Afib (on Xarelto, last dose 7/10/21), DM, CKD III, bacterial endocarditis 2015 treated with IV Abx, moderate MR and severe AI who presented to Dr. Chapman for evaluation of valvular disease.  Pt reports he is very active riding his bike and is "completely asymptomatic."  Pt is referred to [facility] for diagnostic cardiac cath on 7/12/21 prior to bioprosthetic AVR scheduled for 7/13/21. Denies CP, palpitations, syncope, SOB, orthopnea, OCASIO, intermittent claudication, lower extremity edema.    Neurovascular:   No delirium. Denies pain.    Cardiovascular:   Hemodynamically stable. HR bradycardic 48-50's.    CAD  -start aspirin for acute graft patency,  -continue statin for long term graft patency  -carotid doppler US 7/13  -CTSX scheduled 7/13: AVR, MVR, cryomaze, ANGELO clip  -preop labs ordered  -consent in chart  -benefits, risks, and alternatives discussed with patient    BP  -d/c sotalol, start metoprolol 12.5mg BID (?) , for BP control    Respiratory:   02 Sat = 98% on RA.  -Wean to RA from for O2 Sat > 93%.  -Encourage Cough, deep breathing and Use of IS 10x / hr while awake.  -Chest PT 4xdaily    GI:   Stable  -protonix for GI protection  -Miralax for bowl regimen  -PO DASH diet  -Last BM: 7/12/21 AM    Renal / :   BUN/Cr elevated, but stable, in setting of CKD stage 3.  -Continue to monitor I/O's.  - avoid renal toxic medications.    Endocrine:    Blood sugar stable   - sliding scale insulin    Hematologic:  H/H stable  -DVT prophylaxis with Heparin sq    ID:  -Afebrile  -Continue to observe for SIRS/Sepsis Syndrome.    Disposition:  - Home after surgery and recovery 75y Male PMHx of HTN, HLD, Afib (on Xarelto, last dose 7/10/21), DM, CKD III, bacterial endocarditis 2015 treated with IV Abx, moderate MR and severe AI who presented to Dr. Chapman for evaluation of valvular disease.  Pt reports he is very active riding his bike and is "completely asymptomatic."  Pt is referred to [facility] for diagnostic cardiac cath on 7/12/21 prior to bioprosthetic AVR scheduled for 7/13/21. Denies CP, palpitations, syncope, SOB, orthopnea, OCASIO, intermittent claudication, lower extremity edema.    Neurovascular:   No delirium. Denies pain.    Cardiovascular:   Hemodynamically stable. HR bradycardic 48-50's.  -carotid doppler US 7/12/21  -CTSX scheduled 7/13: AVR, MVR, cryomaze, ANGELO clip with Dr. Chapman  -preop labs ordered  -consent signed? and in chart  -benefits, risks, and alternatives discussed with patient    CAD  - start aspirin for acute graft patency,  -continue statin for long term graft patency    BP  -d/c sotalol, start metoprolol 12.5mg BID (?) , for BP control    Respiratory:   02 Sat = 98% on RA.  -Wean to RA from for O2 Sat > 93%.  -Encourage Cough, deep breathing and Use of IS 10x / hr while awake.  -Chest PT 4xdaily    GI:   Stable  -protonix for GI protection  -Miralax for bowl regimen  -PO DASH diet  -Last BM: 7/12/21 AM    Renal / :   BUN/Cr elevated, but stable, in setting of CKD stage 3.  -Continue to monitor I/O's.  -avoid renal toxic medications.    Endocrine:    Blood sugar stable   - sliding scale insulin    Hematologic:  H/H stable  -DVT prophylaxis with Heparin sq    ID:  -Afebrile  -Continue to observe for SIRS/Sepsis Syndrome.    Disposition:  - Home after surgery and recovery

## 2021-07-13 ENCOUNTER — APPOINTMENT (OUTPATIENT)
Dept: CARDIOTHORACIC SURGERY | Facility: HOSPITAL | Age: 76
End: 2021-07-13

## 2021-07-13 LAB
ALBUMIN SERPL ELPH-MCNC: 3.2 G/DL — LOW (ref 3.3–5)
ALBUMIN SERPL ELPH-MCNC: 4.1 G/DL — SIGNIFICANT CHANGE UP (ref 3.3–5)
ALP SERPL-CCNC: 30 U/L — LOW (ref 40–120)
ALP SERPL-CCNC: 37 U/L — LOW (ref 40–120)
ALT FLD-CCNC: 12 U/L — SIGNIFICANT CHANGE UP (ref 10–45)
ALT FLD-CCNC: 17 U/L — SIGNIFICANT CHANGE UP (ref 10–45)
ANION GAP SERPL CALC-SCNC: 10 MMOL/L — SIGNIFICANT CHANGE UP (ref 5–17)
ANION GAP SERPL CALC-SCNC: 15 MMOL/L — SIGNIFICANT CHANGE UP (ref 5–17)
ANION GAP SERPL CALC-SCNC: 9 MMOL/L — SIGNIFICANT CHANGE UP (ref 5–17)
APTT BLD: 30.2 SEC — SIGNIFICANT CHANGE UP (ref 27.5–35.5)
APTT BLD: 30.5 SEC — SIGNIFICANT CHANGE UP (ref 27.5–35.5)
APTT BLD: 36.7 SEC — HIGH (ref 27.5–35.5)
AST SERPL-CCNC: 66 U/L — HIGH (ref 10–40)
AST SERPL-CCNC: 67 U/L — HIGH (ref 10–40)
BASE EXCESS BLDV CALC-SCNC: -4.5 MMOL/L — LOW (ref -2–3)
BASE EXCESS BLDV CALC-SCNC: -5.8 MMOL/L — LOW (ref -2–3)
BILIRUB SERPL-MCNC: 0.7 MG/DL — SIGNIFICANT CHANGE UP (ref 0.2–1.2)
BILIRUB SERPL-MCNC: 0.9 MG/DL — SIGNIFICANT CHANGE UP (ref 0.2–1.2)
BLD GP AB SCN SERPL QL: NEGATIVE — SIGNIFICANT CHANGE UP
BUN SERPL-MCNC: 26 MG/DL — HIGH (ref 7–23)
BUN SERPL-MCNC: 26 MG/DL — HIGH (ref 7–23)
BUN SERPL-MCNC: 36 MG/DL — HIGH (ref 7–23)
CA-I SERPL-SCNC: 1.17 MMOL/L — SIGNIFICANT CHANGE UP (ref 1.15–1.33)
CA-I SERPL-SCNC: 1.28 MMOL/L — SIGNIFICANT CHANGE UP (ref 1.15–1.33)
CALCIUM SERPL-MCNC: 8.4 MG/DL — SIGNIFICANT CHANGE UP (ref 8.4–10.5)
CALCIUM SERPL-MCNC: 8.8 MG/DL — SIGNIFICANT CHANGE UP (ref 8.4–10.5)
CALCIUM SERPL-MCNC: 9.9 MG/DL — SIGNIFICANT CHANGE UP (ref 8.4–10.5)
CHLORIDE SERPL-SCNC: 106 MMOL/L — SIGNIFICANT CHANGE UP (ref 96–108)
CHLORIDE SERPL-SCNC: 108 MMOL/L — SIGNIFICANT CHANGE UP (ref 96–108)
CHLORIDE SERPL-SCNC: 113 MMOL/L — HIGH (ref 96–108)
CO2 BLDV-SCNC: 20.6 MMOL/L — LOW (ref 22–26)
CO2 BLDV-SCNC: 22.3 MMOL/L — SIGNIFICANT CHANGE UP (ref 22–26)
CO2 SERPL-SCNC: 18 MMOL/L — LOW (ref 22–31)
CO2 SERPL-SCNC: 20 MMOL/L — LOW (ref 22–31)
CO2 SERPL-SCNC: 24 MMOL/L — SIGNIFICANT CHANGE UP (ref 22–31)
CREAT SERPL-MCNC: 1.24 MG/DL — SIGNIFICANT CHANGE UP (ref 0.5–1.3)
CREAT SERPL-MCNC: 1.36 MG/DL — HIGH (ref 0.5–1.3)
CREAT SERPL-MCNC: 1.46 MG/DL — HIGH (ref 0.5–1.3)
GAS PNL BLDA: SIGNIFICANT CHANGE UP
GAS PNL BLDV: 139 MMOL/L — SIGNIFICANT CHANGE UP (ref 136–145)
GAS PNL BLDV: 140 MMOL/L — SIGNIFICANT CHANGE UP (ref 136–145)
GAS PNL BLDV: SIGNIFICANT CHANGE UP
GAS PNL BLDV: SIGNIFICANT CHANGE UP
GLUCOSE BLDC GLUCOMTR-MCNC: 148 MG/DL — HIGH (ref 70–99)
GLUCOSE BLDC GLUCOMTR-MCNC: 152 MG/DL — HIGH (ref 70–99)
GLUCOSE BLDC GLUCOMTR-MCNC: 166 MG/DL — HIGH (ref 70–99)
GLUCOSE BLDC GLUCOMTR-MCNC: 190 MG/DL — HIGH (ref 70–99)
GLUCOSE BLDC GLUCOMTR-MCNC: 197 MG/DL — HIGH (ref 70–99)
GLUCOSE BLDC GLUCOMTR-MCNC: 197 MG/DL — HIGH (ref 70–99)
GLUCOSE BLDC GLUCOMTR-MCNC: 206 MG/DL — HIGH (ref 70–99)
GLUCOSE SERPL-MCNC: 162 MG/DL — HIGH (ref 70–99)
GLUCOSE SERPL-MCNC: 198 MG/DL — HIGH (ref 70–99)
GLUCOSE SERPL-MCNC: 198 MG/DL — HIGH (ref 70–99)
HCO3 BLDV-SCNC: 20 MMOL/L — LOW (ref 22–29)
HCO3 BLDV-SCNC: 21 MMOL/L — LOW (ref 22–29)
HCT VFR BLD CALC: 29.1 % — LOW (ref 39–50)
HCT VFR BLD CALC: 34.1 % — LOW (ref 39–50)
HCT VFR BLD CALC: 39.6 % — SIGNIFICANT CHANGE UP (ref 39–50)
HGB BLD-MCNC: 10.2 G/DL — LOW (ref 13–17)
HGB BLD-MCNC: 12 G/DL — LOW (ref 13–17)
HGB BLD-MCNC: 13.5 G/DL — SIGNIFICANT CHANGE UP (ref 13–17)
INR BLD: 1.01 — SIGNIFICANT CHANGE UP (ref 0.88–1.16)
INR BLD: 1.21 — HIGH (ref 0.88–1.16)
INR BLD: 1.22 — HIGH (ref 0.88–1.16)
LACTATE SERPL-SCNC: 1.3 MMOL/L — SIGNIFICANT CHANGE UP (ref 0.5–2)
LACTATE SERPL-SCNC: 1.8 MMOL/L — SIGNIFICANT CHANGE UP (ref 0.5–2)
MAGNESIUM SERPL-MCNC: 1.9 MG/DL — SIGNIFICANT CHANGE UP (ref 1.6–2.6)
MAGNESIUM SERPL-MCNC: 2 MG/DL — SIGNIFICANT CHANGE UP (ref 1.6–2.6)
MAGNESIUM SERPL-MCNC: 2.5 MG/DL — SIGNIFICANT CHANGE UP (ref 1.6–2.6)
MCHC RBC-ENTMCNC: 29.8 PG — SIGNIFICANT CHANGE UP (ref 27–34)
MCHC RBC-ENTMCNC: 30.4 PG — SIGNIFICANT CHANGE UP (ref 27–34)
MCHC RBC-ENTMCNC: 30.5 PG — SIGNIFICANT CHANGE UP (ref 27–34)
MCHC RBC-ENTMCNC: 34.1 GM/DL — SIGNIFICANT CHANGE UP (ref 32–36)
MCHC RBC-ENTMCNC: 35.1 GM/DL — SIGNIFICANT CHANGE UP (ref 32–36)
MCHC RBC-ENTMCNC: 35.2 GM/DL — SIGNIFICANT CHANGE UP (ref 32–36)
MCV RBC AUTO: 86.3 FL — SIGNIFICANT CHANGE UP (ref 80–100)
MCV RBC AUTO: 87.1 FL — SIGNIFICANT CHANGE UP (ref 80–100)
MCV RBC AUTO: 87.4 FL — SIGNIFICANT CHANGE UP (ref 80–100)
NRBC # BLD: 0 /100 WBCS — SIGNIFICANT CHANGE UP (ref 0–0)
PCO2 BLDV: 37 MMHG — LOW (ref 42–55)
PCO2 BLDV: 40 MMHG — LOW (ref 42–55)
PH BLDV: 7.33 — SIGNIFICANT CHANGE UP (ref 7.32–7.43)
PH BLDV: 7.33 — SIGNIFICANT CHANGE UP (ref 7.32–7.43)
PHOSPHATE SERPL-MCNC: 2.1 MG/DL — LOW (ref 2.5–4.5)
PHOSPHATE SERPL-MCNC: 2.2 MG/DL — LOW (ref 2.5–4.5)
PHOSPHATE SERPL-MCNC: 3.8 MG/DL — SIGNIFICANT CHANGE UP (ref 2.5–4.5)
PLATELET # BLD AUTO: 115 K/UL — LOW (ref 150–400)
PLATELET # BLD AUTO: 129 K/UL — LOW (ref 150–400)
PLATELET # BLD AUTO: 185 K/UL — SIGNIFICANT CHANGE UP (ref 150–400)
PO2 BLDV: 33 MMHG — SIGNIFICANT CHANGE UP
PO2 BLDV: 41 MMHG — SIGNIFICANT CHANGE UP
POTASSIUM BLDV-SCNC: 4 MMOL/L — SIGNIFICANT CHANGE UP (ref 3.5–5.1)
POTASSIUM BLDV-SCNC: 4.2 MMOL/L — SIGNIFICANT CHANGE UP (ref 3.5–5.1)
POTASSIUM SERPL-MCNC: 4.1 MMOL/L — SIGNIFICANT CHANGE UP (ref 3.5–5.3)
POTASSIUM SERPL-MCNC: 4.2 MMOL/L — SIGNIFICANT CHANGE UP (ref 3.5–5.3)
POTASSIUM SERPL-MCNC: 4.4 MMOL/L — SIGNIFICANT CHANGE UP (ref 3.5–5.3)
POTASSIUM SERPL-SCNC: 4.1 MMOL/L — SIGNIFICANT CHANGE UP (ref 3.5–5.3)
POTASSIUM SERPL-SCNC: 4.2 MMOL/L — SIGNIFICANT CHANGE UP (ref 3.5–5.3)
POTASSIUM SERPL-SCNC: 4.4 MMOL/L — SIGNIFICANT CHANGE UP (ref 3.5–5.3)
PROT SERPL-MCNC: 5.1 G/DL — LOW (ref 6–8.3)
PROT SERPL-MCNC: 5.6 G/DL — LOW (ref 6–8.3)
PROTHROM AB SERPL-ACNC: 12.1 SEC — SIGNIFICANT CHANGE UP (ref 10.6–13.6)
PROTHROM AB SERPL-ACNC: 14.4 SEC — HIGH (ref 10.6–13.6)
PROTHROM AB SERPL-ACNC: 14.5 SEC — HIGH (ref 10.6–13.6)
RBC # BLD: 3.34 M/UL — LOW (ref 4.2–5.8)
RBC # BLD: 3.95 M/UL — LOW (ref 4.2–5.8)
RBC # BLD: 4.53 M/UL — SIGNIFICANT CHANGE UP (ref 4.2–5.8)
RBC # FLD: 12.9 % — SIGNIFICANT CHANGE UP (ref 10.3–14.5)
RBC # FLD: 13 % — SIGNIFICANT CHANGE UP (ref 10.3–14.5)
RBC # FLD: 13 % — SIGNIFICANT CHANGE UP (ref 10.3–14.5)
RH IG SCN BLD-IMP: POSITIVE — SIGNIFICANT CHANGE UP
SAO2 % BLDV: 67.2 % — SIGNIFICANT CHANGE UP
SAO2 % BLDV: 68.4 % — SIGNIFICANT CHANGE UP
SODIUM SERPL-SCNC: 139 MMOL/L — SIGNIFICANT CHANGE UP (ref 135–145)
SODIUM SERPL-SCNC: 141 MMOL/L — SIGNIFICANT CHANGE UP (ref 135–145)
SODIUM SERPL-SCNC: 143 MMOL/L — SIGNIFICANT CHANGE UP (ref 135–145)
WBC # BLD: 11.86 K/UL — HIGH (ref 3.8–10.5)
WBC # BLD: 6.17 K/UL — SIGNIFICANT CHANGE UP (ref 3.8–10.5)
WBC # BLD: 9.24 K/UL — SIGNIFICANT CHANGE UP (ref 3.8–10.5)
WBC # FLD AUTO: 11.86 K/UL — HIGH (ref 3.8–10.5)
WBC # FLD AUTO: 6.17 K/UL — SIGNIFICANT CHANGE UP (ref 3.8–10.5)
WBC # FLD AUTO: 9.24 K/UL — SIGNIFICANT CHANGE UP (ref 3.8–10.5)

## 2021-07-13 PROCEDURE — 33405 REPLACEMENT AORTIC VALVE OPN: CPT | Mod: 80

## 2021-07-13 PROCEDURE — 93010 ELECTROCARDIOGRAM REPORT: CPT

## 2021-07-13 PROCEDURE — 33427 REPAIR OF MITRAL VALVE: CPT

## 2021-07-13 PROCEDURE — 71045 X-RAY EXAM CHEST 1 VIEW: CPT | Mod: 26

## 2021-07-13 PROCEDURE — 99292 CRITICAL CARE ADDL 30 MIN: CPT

## 2021-07-13 PROCEDURE — 33999 UNLISTED PX CARDIAC SURGERY: CPT

## 2021-07-13 PROCEDURE — 33405 REPLACEMENT AORTIC VALVE OPN: CPT | Mod: 59

## 2021-07-13 PROCEDURE — 99291 CRITICAL CARE FIRST HOUR: CPT

## 2021-07-13 PROCEDURE — 33427 REPAIR OF MITRAL VALVE: CPT | Mod: 80

## 2021-07-13 PROCEDURE — 33257 ABLATE ATRIA LMTD ADD-ON: CPT

## 2021-07-13 RX ORDER — CEFAZOLIN SODIUM 1 G
2000 VIAL (EA) INJECTION EVERY 8 HOURS
Refills: 0 | Status: COMPLETED | OUTPATIENT
Start: 2021-07-13 | End: 2021-07-15

## 2021-07-13 RX ORDER — VASOPRESSIN 20 [USP'U]/ML
0.05 INJECTION INTRAVENOUS
Qty: 50 | Refills: 0 | Status: DISCONTINUED | OUTPATIENT
Start: 2021-07-13 | End: 2021-07-13

## 2021-07-13 RX ORDER — PANTOPRAZOLE SODIUM 20 MG/1
40 TABLET, DELAYED RELEASE ORAL DAILY
Refills: 0 | Status: DISCONTINUED | OUTPATIENT
Start: 2021-07-13 | End: 2021-07-14

## 2021-07-13 RX ORDER — AMIODARONE HYDROCHLORIDE 400 MG/1
150 TABLET ORAL ONCE
Refills: 0 | Status: COMPLETED | OUTPATIENT
Start: 2021-07-13 | End: 2021-07-13

## 2021-07-13 RX ORDER — SODIUM CHLORIDE 9 MG/ML
1000 INJECTION INTRAMUSCULAR; INTRAVENOUS; SUBCUTANEOUS
Refills: 0 | Status: DISCONTINUED | OUTPATIENT
Start: 2021-07-13 | End: 2021-07-19

## 2021-07-13 RX ORDER — DEXMEDETOMIDINE HYDROCHLORIDE IN 0.9% SODIUM CHLORIDE 4 UG/ML
0.2 INJECTION INTRAVENOUS
Qty: 400 | Refills: 0 | Status: DISCONTINUED | OUTPATIENT
Start: 2021-07-13 | End: 2021-07-14

## 2021-07-13 RX ORDER — ALBUMIN HUMAN 25 %
250 VIAL (ML) INTRAVENOUS
Refills: 0 | Status: COMPLETED | OUTPATIENT
Start: 2021-07-13 | End: 2021-07-13

## 2021-07-13 RX ORDER — PROPOFOL 10 MG/ML
20 INJECTION, EMULSION INTRAVENOUS
Qty: 1000 | Refills: 0 | Status: DISCONTINUED | OUTPATIENT
Start: 2021-07-13 | End: 2021-07-14

## 2021-07-13 RX ORDER — EPINEPHRINE 0.3 MG/.3ML
0.02 INJECTION INTRAMUSCULAR; SUBCUTANEOUS
Qty: 4 | Refills: 0 | Status: DISCONTINUED | OUTPATIENT
Start: 2021-07-13 | End: 2021-07-14

## 2021-07-13 RX ORDER — MILRINONE LACTATE 1 MG/ML
0.25 INJECTION, SOLUTION INTRAVENOUS
Qty: 20 | Refills: 0 | Status: DISCONTINUED | OUTPATIENT
Start: 2021-07-13 | End: 2021-07-16

## 2021-07-13 RX ORDER — DEXTROSE 50 % IN WATER 50 %
25 SYRINGE (ML) INTRAVENOUS
Refills: 0 | Status: DISCONTINUED | OUTPATIENT
Start: 2021-07-13 | End: 2021-07-14

## 2021-07-13 RX ORDER — MAGNESIUM SULFATE 500 MG/ML
2 VIAL (ML) INJECTION ONCE
Refills: 0 | Status: COMPLETED | OUTPATIENT
Start: 2021-07-13 | End: 2021-07-13

## 2021-07-13 RX ORDER — INSULIN HUMAN 100 [IU]/ML
2 INJECTION, SOLUTION SUBCUTANEOUS
Qty: 50 | Refills: 0 | Status: DISCONTINUED | OUTPATIENT
Start: 2021-07-13 | End: 2021-07-14

## 2021-07-13 RX ORDER — CHLORHEXIDINE GLUCONATE 213 G/1000ML
15 SOLUTION TOPICAL EVERY 12 HOURS
Refills: 0 | Status: DISCONTINUED | OUTPATIENT
Start: 2021-07-13 | End: 2021-07-14

## 2021-07-13 RX ORDER — NICARDIPINE HYDROCHLORIDE 30 MG/1
5 CAPSULE, EXTENDED RELEASE ORAL
Qty: 40 | Refills: 0 | Status: DISCONTINUED | OUTPATIENT
Start: 2021-07-13 | End: 2021-07-14

## 2021-07-13 RX ORDER — CEFAZOLIN SODIUM 1 G
2000 VIAL (EA) INJECTION EVERY 8 HOURS
Refills: 0 | Status: DISCONTINUED | OUTPATIENT
Start: 2021-07-13 | End: 2021-07-13

## 2021-07-13 RX ORDER — FENTANYL CITRATE 50 UG/ML
25 INJECTION INTRAVENOUS EVERY 4 HOURS
Refills: 0 | Status: DISCONTINUED | OUTPATIENT
Start: 2021-07-13 | End: 2021-07-14

## 2021-07-13 RX ORDER — POTASSIUM PHOSPHATE, MONOBASIC POTASSIUM PHOSPHATE, DIBASIC 236; 224 MG/ML; MG/ML
15 INJECTION, SOLUTION INTRAVENOUS ONCE
Refills: 0 | Status: COMPLETED | OUTPATIENT
Start: 2021-07-13 | End: 2021-07-13

## 2021-07-13 RX ORDER — DEXTROSE 50 % IN WATER 50 %
50 SYRINGE (ML) INTRAVENOUS
Refills: 0 | Status: DISCONTINUED | OUTPATIENT
Start: 2021-07-13 | End: 2021-07-14

## 2021-07-13 RX ADMIN — Medication 2000 MILLIGRAM(S): at 21:53

## 2021-07-13 RX ADMIN — Medication 125 MILLILITER(S): at 16:15

## 2021-07-13 RX ADMIN — Medication 2: at 07:18

## 2021-07-13 RX ADMIN — HEPARIN SODIUM 5000 UNIT(S): 5000 INJECTION INTRAVENOUS; SUBCUTANEOUS at 06:14

## 2021-07-13 RX ADMIN — DEXMEDETOMIDINE HYDROCHLORIDE IN 0.9% SODIUM CHLORIDE 4.13 MICROGRAM(S)/KG/HR: 4 INJECTION INTRAVENOUS at 17:36

## 2021-07-13 RX ADMIN — PANTOPRAZOLE SODIUM 40 MILLIGRAM(S): 20 TABLET, DELAYED RELEASE ORAL at 21:57

## 2021-07-13 RX ADMIN — ATORVASTATIN CALCIUM 40 MILLIGRAM(S): 80 TABLET, FILM COATED ORAL at 21:56

## 2021-07-13 RX ADMIN — AMIODARONE HYDROCHLORIDE 133.33 MILLIGRAM(S): 400 TABLET ORAL at 19:23

## 2021-07-13 RX ADMIN — Medication 125 MILLILITER(S): at 19:16

## 2021-07-13 RX ADMIN — PROPOFOL 9.91 MICROGRAM(S)/KG/MIN: 10 INJECTION, EMULSION INTRAVENOUS at 15:00

## 2021-07-13 RX ADMIN — FENTANYL CITRATE 25 MICROGRAM(S): 50 INJECTION INTRAVENOUS at 15:30

## 2021-07-13 RX ADMIN — PANTOPRAZOLE SODIUM 40 MILLIGRAM(S): 20 TABLET, DELAYED RELEASE ORAL at 06:14

## 2021-07-13 RX ADMIN — CHLORHEXIDINE GLUCONATE 1 APPLICATION(S): 213 SOLUTION TOPICAL at 06:15

## 2021-07-13 RX ADMIN — CHLORHEXIDINE GLUCONATE 15 MILLILITER(S): 213 SOLUTION TOPICAL at 17:51

## 2021-07-13 RX ADMIN — Medication 50 GRAM(S): at 23:16

## 2021-07-13 RX ADMIN — Medication 0.1 MILLIGRAM(S): at 06:14

## 2021-07-13 RX ADMIN — Medication 125 MILLILITER(S): at 18:55

## 2021-07-13 RX ADMIN — CHLORHEXIDINE GLUCONATE 10 MILLILITER(S): 213 SOLUTION TOPICAL at 06:14

## 2021-07-13 RX ADMIN — POTASSIUM PHOSPHATE, MONOBASIC POTASSIUM PHOSPHATE, DIBASIC 62.5 MILLIMOLE(S): 236; 224 INJECTION, SOLUTION INTRAVENOUS at 17:51

## 2021-07-13 RX ADMIN — Medication 125 MILLILITER(S): at 15:45

## 2021-07-13 RX ADMIN — FENTANYL CITRATE 25 MICROGRAM(S): 50 INJECTION INTRAVENOUS at 15:00

## 2021-07-13 RX ADMIN — NICARDIPINE HYDROCHLORIDE 25 MG/HR: 30 CAPSULE, EXTENDED RELEASE ORAL at 14:40

## 2021-07-13 RX ADMIN — HEPARIN SODIUM 5000 UNIT(S): 5000 INJECTION INTRAVENOUS; SUBCUTANEOUS at 21:56

## 2021-07-13 NOTE — PROGRESS NOTE ADULT - SUBJECTIVE AND OBJECTIVE BOX
CTICU  CRITICAL  CARE  attending     Hand off received 					   Pertinent clinical, laboratory, radiographic, hemodynamic, echocardiographic, respiratory data, microbiologic data and chart were reviewed and analyzed frequently throughout the course of the day and night      75 years old male with DM, HTN, HLD, Afib (on Xarelto, last dose 7/10/21), CKD III, history of bacterial endocarditis 2015 treated with IV Abx, H/O moderate MR and severe AI.  He is very active, he is an avid bike rider with minimal symptoms.  He denies fever, chills, cough, CP, palpitations, SOB, PND/orthopnea, abdominal pain, N/V/D, dizziness, syncope.   Recent ECHO: Normal EF, moderate to severe LV dilatation, severe AI. Bicuspid AV, moderate MR.   In light of pt's risk factors and known valvular disease pt is referred to [facility] for AVR  Cardiac cath: No CAD.    S/P AVR  S/P mitral valve repair.       FAMILY HISTORY:  No pertinent family history in first degree relatives    PAST MEDICAL & SURGICAL HISTORY:  Hypertension  Hyperlipidemia  Afib  Diabetes mellitus  Stage 3 chronic kidney disease  H/O bacterial endocarditis  Severe aortic insufficiency  Moderate mitral regurgitation  S/P rotator cuff repair  S/P tonsillectomy          14 system review was unremarkable      Vital signs, hemodynamic and respiratory parameters were reviewed from the bedside nursing flow sheet.  ICU Vital Signs Last 24 Hrs  T(C): 37.9 (13 Jul 2021 21:00), Max: 37.9 (13 Jul 2021 21:00)  T(F): 100.2 (13 Jul 2021 21:00), Max: 100.2 (13 Jul 2021 21:00)  HR: 64 (13 Jul 2021 23:00) (50 - 81)  BP: 110/58 (13 Jul 2021 17:00) (110/58 - 177/80)  BP(mean): 80 (13 Jul 2021 17:00) (80 - 102)  ABP: 147/59 (13 Jul 2021 23:00) (94/43 - 159/71)  ABP(mean): 86 (13 Jul 2021 23:00) (57 - 95)  RR: 17 (13 Jul 2021 23:00) (16 - 21)  SpO2: 99% (13 Jul 2021 23:00) (96% - 100%)    Adult Advanced Hemodynamics Last 24 Hrs  CVP(mm Hg): 10 (13 Jul 2021 23:00) (4 - 15)  CVP(cm H2O): --  CO: 5.2 (13 Jul 2021 21:00) (4.3 - 5.2)  CI: 2.6 (13 Jul 2021 21:00) (2.2 - 2.6)  PA: 36/12 (13 Jul 2021 23:00) (21/8 - 39/12)  PA(mean): 22 (13 Jul 2021 23:00) (13 - 26)  PCWP: --  SVR: 1152 (13 Jul 2021 21:00) (966 - 1290)  SVRI: 2304 (13 Jul 2021 21:00) (1208 - 2496)  PVR: --  PVRI: --, ABG - ( 13 Jul 2021 21:11 )  pH, Arterial: 7.39  pH, Blood: x     /  pCO2: 32    /  pO2: 155   / HCO3: 19    / Base Excess: -4.6  /  SaO2: 99.1              Mode: standby    Intake and output was reviewed and the fluid balance was calculated  Daily Height in cm: 170 (13 Jul 2021 05:54)    Daily   I&O's Summary    12 Jul 2021 07:01  -  13 Jul 2021 07:00  --------------------------------------------------------  IN: 0 mL / OUT: 450 mL / NET: -450 mL    13 Jul 2021 07:01  -  13 Jul 2021 23:48  --------------------------------------------------------  IN: 2192.9 mL / OUT: 1170 mL / NET: 1022.9 mL        All lines and drain sites were assessed    Neuro: No change in the mental status from the baseline. Moves all 4 extremities.  Neck: No JVD.  CVS: S1, S2, No S3.  Lungs: Good air entry bilaterally.  Abd: Soft. No tenderness. + Bowel sounds.  Vascular: + DP/PT.  Extremities: No edema.  Lymphatic: Normal.  Skin: No abnormalities.      labs  CBC Full  -  ( 13 Jul 2021 19:48 )  WBC Count : 9.24 K/uL  RBC Count : 3.34 M/uL  Hemoglobin : 10.2 g/dL  Hematocrit : 29.1 %  Platelet Count - Automated : 115 K/uL  Mean Cell Volume : 87.1 fl  Mean Cell Hemoglobin : 30.5 pg  Mean Cell Hemoglobin Concentration : 35.1 gm/dL  Auto Neutrophil # : x  Auto Lymphocyte # : x  Auto Monocyte # : x  Auto Eosinophil # : x  Auto Basophil # : x  Auto Neutrophil % : x  Auto Lymphocyte % : x  Auto Monocyte % : x  Auto Eosinophil % : x  Auto Basophil % : x    07-13    141  |  108  |  26<H>  ----------------------------<  198<H>  4.1   |  18<L>  |  1.36<H>    Ca    8.4      13 Jul 2021 19:48  Phos  2.2     07-13  Mg     2.0     07-13    TPro  5.6<L>  /  Alb  4.1  /  TBili  0.9  /  DBili  x   /  AST  66<H>  /  ALT  12  /  AlkPhos  30<L>  07-13    PT/INR - ( 13 Jul 2021 19:48 )   PT: 14.5 sec;   INR: 1.22          PTT - ( 13 Jul 2021 19:48 )  PTT:36.7 sec  The current medications were reviewed   MEDICATIONS  (STANDING):  aspirin enteric coated 81 milliGRAM(s) Oral daily  atorvastatin 40 milliGRAM(s) Oral at bedtime  ceFAZolin  Injectable. 2000 milliGRAM(s) IV Push every 8 hours  chlorhexidine 0.12% Liquid 15 milliLiter(s) Oral Mucosa every 12 hours  dexMEDEtomidine Infusion 0.2 MICROgram(s)/kG/Hr (4.13 mL/Hr) IV Continuous <Continuous>  dextrose 50% Injectable 50 milliLiter(s) IV Push every 15 minutes  dextrose 50% Injectable 25 milliLiter(s) IV Push every 15 minutes  EPINEPHrine    Infusion 0.02 MICROgram(s)/kG/Min (6.2 mL/Hr) IV Continuous <Continuous>  fentaNYL    Injectable 25 MICROGram(s) IV Push every 4 hours  heparin   Injectable 5000 Unit(s) SubCutaneous every 8 hours  insulin regular Infusion 2 Unit(s)/Hr (2 mL/Hr) IV Continuous <Continuous>  milrinone Infusion 0.25 MICROgram(s)/kG/Min (6.2 mL/Hr) IV Continuous <Continuous>  niCARdipine Infusion 5 mG/Hr (25 mL/Hr) IV Continuous <Continuous>  pantoprazole  Injectable 40 milliGRAM(s) IV Push daily  propofol Infusion 20 MICROgram(s)/kG/Min (9.91 mL/Hr) IV Continuous <Continuous>  sodium chloride 0.9%. 1000 milliLiter(s) (10 mL/Hr) IV Continuous <Continuous>           75 years old male with severe AI and moderate MR.  S/P AVR  S/P MV repair.  S/P  maze  S/P occlusion of LA appendage.  Uncontrolled DM  Metabolic acidosis.  NELIA.  Hemodynamically stable.  Good oxygenation.  Fair urine out put.        My plan includes :  EXTUBATED.  OPTIMIZE Glycemic Control.  Statin and Betablocker.  Close hemodynamic, ventilatory and drain monitoring and management  Monitor for arrhythmias and monitor parameters for organ perfusion  Monitor neurologic status  Monitor renal function.  Head of the bed should remain elevated to 45 deg .   Chest PT and IS will be encouraged  Monitor adequacy of oxygenation and ventilation and attempt to wean oxygen  Nutritional goals will be met using po eventually , ensure adequate caloric intake and monitor the same  Stress ulcer and VTE prophylaxis will be achieved    Electrolytes have been repleted as necessary and wound care has been carried out. Pain control has been achieved.   Aggressive physical therapy and early mobility and ambulation goals will be met   The family was updated about the course and plan  CRITICAL CARE TIME SPENT in evaluation and management, reassessments, review and interpretation of labs and x-rays, ventilator and hemodynamic management, formulating a plan and coordinating care: ___60____ MIN.  Time does not include procedural time.  CTICU ATTENDING     					    Lemuel Wright MD

## 2021-07-13 NOTE — BRIEF OPERATIVE NOTE - NSICDXBRIEFPOSTOP_GEN_ALL_CORE_FT
POST-OP DIAGNOSIS:  Chronic atrial fibrillation 13-Jul-2021 14:14:38  Monica Summers  Aortic insufficiency 13-Jul-2021 14:14:47  Monica Summers  Mild mitral valve regurgitation 13-Jul-2021 14:14:58  Monica Summers
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
## 2021-07-13 NOTE — BRIEF OPERATIVE NOTE - NS MD BRIEF OP ADULT CIRCULATORY ARREST TIME
Treating Viral Respiratory Illness in Children  Viral respiratory illnesses include colds, the flu, and RSV (respiratory syncytial virus). Treatment will focus on relieving your childs symptoms and ensuring that the infection does not get worse. Antibiotics are not effective against viruses. Always see your childs healthcare provider if your child has trouble breathing.    Helping your child feel better  · Give your child plenty of fluids, such as water or apple juice.  · Make sure your child gets plenty of rest.  · Keep your infants nose clear. Use a rubber bulb suction device to remove mucus as needed. Don't be aggressive when suctioning. This may cause more swelling and discomfort.  · Raise the head of your child's bed slightly to make breathing easier.  · Run a cool-mist humidifier or vaporizer in your childs room to keep the air moist and nasal passages clear.  · Don't let anyone smoke near your child.  · Treat your childs fever with acetaminophen. In infants 6 months or older, you may use ibuprofen instead to help reduce the fever. Never give aspirin to a child under age 18. It could cause a rare but serious condition called Reye syndrome.  When to seek medical care  Most children get over colds and flu on their own in time, with rest and care from you. Call your child's healthcare provider if your child:  · Has a fever of 100.4°F (38°C) in a baby younger than 3 months  · Has a repeated fever of 104°F (40°C) or higher  · Has nausea or vomiting, or cant keep even small amounts of liquid down  · Hasnt urinated for 6 hours or more, or has dark or strong-smelling urine  · Has a harsh cough, a cough that doesn't get better, wheezing, or trouble breathing  · Has bad or increasing pain  · Develops a skin rash  · Is very tired or lethargic  · Develops a blue color to the skin around the lips or on the fingers or toes  Date Last Reviewed: 2017  © 2552-1296 [employer]. [address] [place] [address],  DOUGLAS Abbott [address]. All rights reserved. This information is not intended as a substitute for professional medical care. Always follow your healthcare professional's instructions.         0

## 2021-07-13 NOTE — BRIEF OPERATIVE NOTE - COMMENTS
Arrived to CT HD stable intubated on ePI 0.02 AND mILRINOne 0.325    Dr. Jordan  first assisted for the entirety of the case, including but not limited to opening, cannulation, valve repair/replacement, decannulation, and closure.

## 2021-07-13 NOTE — PRE-OP CHECKLIST - SELECT TESTS ORDERED
BMP/CBC/PT/PTT/INR/Type and Screen/POCT Blood Glucose 166/BMP/CBC/PT/PTT/INR/Type and Screen/POCT Blood Glucose

## 2021-07-13 NOTE — BRIEF OPERATIVE NOTE - NSICDXBRIEFPROCEDURE_GEN_ALL_CORE_FT
PROCEDURES:  Aortic valve replacement, bioprosthetic 13-Jul-2021 14:11:21 Mitral valve repair, Maze procedure with pulmonary vein isolation, and let atrial appendage clip Monica Summers

## 2021-07-13 NOTE — BRIEF OPERATIVE NOTE - NSICDXBRIEFPREOP_GEN_ALL_CORE_FT
PRE-OP DIAGNOSIS:  Mitral regurgitation 13-Jul-2021 14:13:40  Monica Summers  Chronic atrial fibrillation 13-Jul-2021 14:13:56  Monica Summers  Aortic insufficiency 13-Jul-2021 14:14:07  Monica Summers

## 2021-07-14 ENCOUNTER — RESULT REVIEW (OUTPATIENT)
Age: 76
End: 2021-07-14

## 2021-07-14 LAB
ALBUMIN SERPL ELPH-MCNC: 3.8 G/DL — SIGNIFICANT CHANGE UP (ref 3.3–5)
ALBUMIN SERPL ELPH-MCNC: 4 G/DL — SIGNIFICANT CHANGE UP (ref 3.3–5)
ALBUMIN SERPL ELPH-MCNC: 4.1 G/DL — SIGNIFICANT CHANGE UP (ref 3.3–5)
ALBUMIN SERPL ELPH-MCNC: 4.1 G/DL — SIGNIFICANT CHANGE UP (ref 3.3–5)
ALBUMIN SERPL ELPH-MCNC: 4.2 G/DL — SIGNIFICANT CHANGE UP (ref 3.3–5)
ALP SERPL-CCNC: 27 U/L — LOW (ref 40–120)
ALP SERPL-CCNC: 28 U/L — LOW (ref 40–120)
ALP SERPL-CCNC: 29 U/L — LOW (ref 40–120)
ALP SERPL-CCNC: 29 U/L — LOW (ref 40–120)
ALP SERPL-CCNC: 30 U/L — LOW (ref 40–120)
ALT FLD-CCNC: 10 U/L — SIGNIFICANT CHANGE UP (ref 10–45)
ALT FLD-CCNC: 11 U/L — SIGNIFICANT CHANGE UP (ref 10–45)
ALT FLD-CCNC: 11 U/L — SIGNIFICANT CHANGE UP (ref 10–45)
ALT FLD-CCNC: 13 U/L — SIGNIFICANT CHANGE UP (ref 10–45)
ALT FLD-CCNC: 8 U/L — LOW (ref 10–45)
ANION GAP SERPL CALC-SCNC: 11 MMOL/L — SIGNIFICANT CHANGE UP (ref 5–17)
ANION GAP SERPL CALC-SCNC: 12 MMOL/L — SIGNIFICANT CHANGE UP (ref 5–17)
ANION GAP SERPL CALC-SCNC: 13 MMOL/L — SIGNIFICANT CHANGE UP (ref 5–17)
ANION GAP SERPL CALC-SCNC: 13 MMOL/L — SIGNIFICANT CHANGE UP (ref 5–17)
ANION GAP SERPL CALC-SCNC: 14 MMOL/L — SIGNIFICANT CHANGE UP (ref 5–17)
APTT BLD: 30.7 SEC — SIGNIFICANT CHANGE UP (ref 27.5–35.5)
APTT BLD: 31.1 SEC — SIGNIFICANT CHANGE UP (ref 27.5–35.5)
APTT BLD: 35.9 SEC — HIGH (ref 27.5–35.5)
APTT BLD: 38.1 SEC — HIGH (ref 27.5–35.5)
APTT BLD: 41.9 SEC — HIGH (ref 27.5–35.5)
AST SERPL-CCNC: 41 U/L — HIGH (ref 10–40)
AST SERPL-CCNC: 47 U/L — HIGH (ref 10–40)
AST SERPL-CCNC: 53 U/L — HIGH (ref 10–40)
AST SERPL-CCNC: 59 U/L — HIGH (ref 10–40)
AST SERPL-CCNC: 67 U/L — HIGH (ref 10–40)
BASE EXCESS BLDV CALC-SCNC: -1.2 MMOL/L — SIGNIFICANT CHANGE UP (ref -2–3)
BASE EXCESS BLDV CALC-SCNC: -2.8 MMOL/L — LOW (ref -2–3)
BILIRUB SERPL-MCNC: 0.5 MG/DL — SIGNIFICANT CHANGE UP (ref 0.2–1.2)
BILIRUB SERPL-MCNC: 0.6 MG/DL — SIGNIFICANT CHANGE UP (ref 0.2–1.2)
BUN SERPL-MCNC: 23 MG/DL — SIGNIFICANT CHANGE UP (ref 7–23)
BUN SERPL-MCNC: 23 MG/DL — SIGNIFICANT CHANGE UP (ref 7–23)
BUN SERPL-MCNC: 25 MG/DL — HIGH (ref 7–23)
BUN SERPL-MCNC: 25 MG/DL — HIGH (ref 7–23)
BUN SERPL-MCNC: 26 MG/DL — HIGH (ref 7–23)
CA-I SERPL-SCNC: 1.08 MMOL/L — LOW (ref 1.15–1.33)
CA-I SERPL-SCNC: 1.11 MMOL/L — LOW (ref 1.15–1.33)
CALCIUM SERPL-MCNC: 8.1 MG/DL — LOW (ref 8.4–10.5)
CALCIUM SERPL-MCNC: 8.2 MG/DL — LOW (ref 8.4–10.5)
CALCIUM SERPL-MCNC: 8.2 MG/DL — LOW (ref 8.4–10.5)
CALCIUM SERPL-MCNC: 8.3 MG/DL — LOW (ref 8.4–10.5)
CALCIUM SERPL-MCNC: 8.3 MG/DL — LOW (ref 8.4–10.5)
CHLORIDE SERPL-SCNC: 100 MMOL/L — SIGNIFICANT CHANGE UP (ref 96–108)
CHLORIDE SERPL-SCNC: 102 MMOL/L — SIGNIFICANT CHANGE UP (ref 96–108)
CHLORIDE SERPL-SCNC: 104 MMOL/L — SIGNIFICANT CHANGE UP (ref 96–108)
CHLORIDE SERPL-SCNC: 107 MMOL/L — SIGNIFICANT CHANGE UP (ref 96–108)
CHLORIDE SERPL-SCNC: 99 MMOL/L — SIGNIFICANT CHANGE UP (ref 96–108)
CO2 BLDV-SCNC: 23 MMOL/L — SIGNIFICANT CHANGE UP (ref 22–26)
CO2 BLDV-SCNC: 24.8 MMOL/L — SIGNIFICANT CHANGE UP (ref 22–26)
CO2 SERPL-SCNC: 20 MMOL/L — LOW (ref 22–31)
CO2 SERPL-SCNC: 21 MMOL/L — LOW (ref 22–31)
CO2 SERPL-SCNC: 21 MMOL/L — LOW (ref 22–31)
CO2 SERPL-SCNC: 22 MMOL/L — SIGNIFICANT CHANGE UP (ref 22–31)
CO2 SERPL-SCNC: 23 MMOL/L — SIGNIFICANT CHANGE UP (ref 22–31)
CREAT SERPL-MCNC: 1.43 MG/DL — HIGH (ref 0.5–1.3)
CREAT SERPL-MCNC: 1.44 MG/DL — HIGH (ref 0.5–1.3)
CREAT SERPL-MCNC: 1.52 MG/DL — HIGH (ref 0.5–1.3)
CREAT SERPL-MCNC: 1.53 MG/DL — HIGH (ref 0.5–1.3)
CREAT SERPL-MCNC: 1.58 MG/DL — HIGH (ref 0.5–1.3)
GAS PNL BLDA: SIGNIFICANT CHANGE UP
GAS PNL BLDV: 134 MMOL/L — LOW (ref 136–145)
GAS PNL BLDV: 139 MMOL/L — SIGNIFICANT CHANGE UP (ref 136–145)
GAS PNL BLDV: SIGNIFICANT CHANGE UP
GLUCOSE BLDC GLUCOMTR-MCNC: 108 MG/DL — HIGH (ref 70–99)
GLUCOSE BLDC GLUCOMTR-MCNC: 126 MG/DL — HIGH (ref 70–99)
GLUCOSE BLDC GLUCOMTR-MCNC: 128 MG/DL — HIGH (ref 70–99)
GLUCOSE BLDC GLUCOMTR-MCNC: 138 MG/DL — HIGH (ref 70–99)
GLUCOSE BLDC GLUCOMTR-MCNC: 144 MG/DL — HIGH (ref 70–99)
GLUCOSE BLDC GLUCOMTR-MCNC: 146 MG/DL — HIGH (ref 70–99)
GLUCOSE BLDC GLUCOMTR-MCNC: 234 MG/DL — HIGH (ref 70–99)
GLUCOSE BLDC GLUCOMTR-MCNC: 97 MG/DL — SIGNIFICANT CHANGE UP (ref 70–99)
GLUCOSE SERPL-MCNC: 121 MG/DL — HIGH (ref 70–99)
GLUCOSE SERPL-MCNC: 224 MG/DL — HIGH (ref 70–99)
GLUCOSE SERPL-MCNC: 252 MG/DL — HIGH (ref 70–99)
GLUCOSE SERPL-MCNC: 278 MG/DL — HIGH (ref 70–99)
GLUCOSE SERPL-MCNC: 280 MG/DL — HIGH (ref 70–99)
HCO3 BLDV-SCNC: 22 MMOL/L — SIGNIFICANT CHANGE UP (ref 22–29)
HCO3 BLDV-SCNC: 24 MMOL/L — SIGNIFICANT CHANGE UP (ref 22–29)
HCT VFR BLD CALC: 25.7 % — LOW (ref 39–50)
HCT VFR BLD CALC: 26.2 % — LOW (ref 39–50)
HCT VFR BLD CALC: 27.3 % — LOW (ref 39–50)
HCT VFR BLD CALC: 27.5 % — LOW (ref 39–50)
HCT VFR BLD CALC: 28.3 % — LOW (ref 39–50)
HGB BLD-MCNC: 9 G/DL — LOW (ref 13–17)
HGB BLD-MCNC: 9.3 G/DL — LOW (ref 13–17)
HGB BLD-MCNC: 9.5 G/DL — LOW (ref 13–17)
HGB BLD-MCNC: 9.6 G/DL — LOW (ref 13–17)
HGB BLD-MCNC: 9.9 G/DL — LOW (ref 13–17)
INR BLD: 1.25 — HIGH (ref 0.88–1.16)
INR BLD: 1.28 — HIGH (ref 0.88–1.16)
INR BLD: 1.32 — HIGH (ref 0.88–1.16)
INR BLD: 1.33 — HIGH (ref 0.88–1.16)
INR BLD: 1.37 — HIGH (ref 0.88–1.16)
LACTATE SERPL-SCNC: 1.3 MMOL/L — SIGNIFICANT CHANGE UP (ref 0.5–2)
LACTATE SERPL-SCNC: 1.7 MMOL/L — SIGNIFICANT CHANGE UP (ref 0.5–2)
LACTATE SERPL-SCNC: 2.4 MMOL/L — HIGH (ref 0.5–2)
LACTATE SERPL-SCNC: 3 MMOL/L — HIGH (ref 0.5–2)
MAGNESIUM SERPL-MCNC: 2.1 MG/DL — SIGNIFICANT CHANGE UP (ref 1.6–2.6)
MAGNESIUM SERPL-MCNC: 2.1 MG/DL — SIGNIFICANT CHANGE UP (ref 1.6–2.6)
MAGNESIUM SERPL-MCNC: 2.2 MG/DL — SIGNIFICANT CHANGE UP (ref 1.6–2.6)
MAGNESIUM SERPL-MCNC: 2.3 MG/DL — SIGNIFICANT CHANGE UP (ref 1.6–2.6)
MAGNESIUM SERPL-MCNC: 2.4 MG/DL — SIGNIFICANT CHANGE UP (ref 1.6–2.6)
MCHC RBC-ENTMCNC: 29.5 PG — SIGNIFICANT CHANGE UP (ref 27–34)
MCHC RBC-ENTMCNC: 29.9 PG — SIGNIFICANT CHANGE UP (ref 27–34)
MCHC RBC-ENTMCNC: 30 PG — SIGNIFICANT CHANGE UP (ref 27–34)
MCHC RBC-ENTMCNC: 30.4 PG — SIGNIFICANT CHANGE UP (ref 27–34)
MCHC RBC-ENTMCNC: 30.7 PG — SIGNIFICANT CHANGE UP (ref 27–34)
MCHC RBC-ENTMCNC: 34.8 GM/DL — SIGNIFICANT CHANGE UP (ref 32–36)
MCHC RBC-ENTMCNC: 34.9 GM/DL — SIGNIFICANT CHANGE UP (ref 32–36)
MCHC RBC-ENTMCNC: 35 GM/DL — SIGNIFICANT CHANGE UP (ref 32–36)
MCHC RBC-ENTMCNC: 35 GM/DL — SIGNIFICANT CHANGE UP (ref 32–36)
MCHC RBC-ENTMCNC: 35.5 GM/DL — SIGNIFICANT CHANGE UP (ref 32–36)
MCV RBC AUTO: 84.6 FL — SIGNIFICANT CHANGE UP (ref 80–100)
MCV RBC AUTO: 85.8 FL — SIGNIFICANT CHANGE UP (ref 80–100)
MCV RBC AUTO: 85.8 FL — SIGNIFICANT CHANGE UP (ref 80–100)
MCV RBC AUTO: 86.5 FL — SIGNIFICANT CHANGE UP (ref 80–100)
MCV RBC AUTO: 86.8 FL — SIGNIFICANT CHANGE UP (ref 80–100)
NRBC # BLD: 0 /100 WBCS — SIGNIFICANT CHANGE UP (ref 0–0)
PCO2 BLDV: 37 MMHG — LOW (ref 42–55)
PCO2 BLDV: 39 MMHG — LOW (ref 42–55)
PH BLDV: 7.38 — SIGNIFICANT CHANGE UP (ref 7.32–7.43)
PH BLDV: 7.39 — SIGNIFICANT CHANGE UP (ref 7.32–7.43)
PHOSPHATE SERPL-MCNC: 3 MG/DL — SIGNIFICANT CHANGE UP (ref 2.5–4.5)
PHOSPHATE SERPL-MCNC: 3.1 MG/DL — SIGNIFICANT CHANGE UP (ref 2.5–4.5)
PHOSPHATE SERPL-MCNC: 3.5 MG/DL — SIGNIFICANT CHANGE UP (ref 2.5–4.5)
PLATELET # BLD AUTO: 105 K/UL — LOW (ref 150–400)
PLATELET # BLD AUTO: 79 K/UL — LOW (ref 150–400)
PLATELET # BLD AUTO: 82 K/UL — LOW (ref 150–400)
PLATELET # BLD AUTO: 86 K/UL — LOW (ref 150–400)
PLATELET # BLD AUTO: 99 K/UL — LOW (ref 150–400)
PO2 BLDV: 32 MMHG — SIGNIFICANT CHANGE UP
PO2 BLDV: 33 MMHG — SIGNIFICANT CHANGE UP
POTASSIUM BLDV-SCNC: 3.5 MMOL/L — SIGNIFICANT CHANGE UP (ref 3.5–5.1)
POTASSIUM BLDV-SCNC: 3.8 MMOL/L — SIGNIFICANT CHANGE UP (ref 3.5–5.1)
POTASSIUM SERPL-MCNC: 3.8 MMOL/L — SIGNIFICANT CHANGE UP (ref 3.5–5.3)
POTASSIUM SERPL-MCNC: 4 MMOL/L — SIGNIFICANT CHANGE UP (ref 3.5–5.3)
POTASSIUM SERPL-MCNC: 4.1 MMOL/L — SIGNIFICANT CHANGE UP (ref 3.5–5.3)
POTASSIUM SERPL-MCNC: 4.1 MMOL/L — SIGNIFICANT CHANGE UP (ref 3.5–5.3)
POTASSIUM SERPL-MCNC: 4.4 MMOL/L — SIGNIFICANT CHANGE UP (ref 3.5–5.3)
POTASSIUM SERPL-SCNC: 3.8 MMOL/L — SIGNIFICANT CHANGE UP (ref 3.5–5.3)
POTASSIUM SERPL-SCNC: 4 MMOL/L — SIGNIFICANT CHANGE UP (ref 3.5–5.3)
POTASSIUM SERPL-SCNC: 4.1 MMOL/L — SIGNIFICANT CHANGE UP (ref 3.5–5.3)
POTASSIUM SERPL-SCNC: 4.1 MMOL/L — SIGNIFICANT CHANGE UP (ref 3.5–5.3)
POTASSIUM SERPL-SCNC: 4.4 MMOL/L — SIGNIFICANT CHANGE UP (ref 3.5–5.3)
PROT SERPL-MCNC: 5.6 G/DL — LOW (ref 6–8.3)
PROT SERPL-MCNC: 5.7 G/DL — LOW (ref 6–8.3)
PROT SERPL-MCNC: 5.7 G/DL — LOW (ref 6–8.3)
PROT SERPL-MCNC: 5.8 G/DL — LOW (ref 6–8.3)
PROT SERPL-MCNC: 6 G/DL — SIGNIFICANT CHANGE UP (ref 6–8.3)
PROTHROM AB SERPL-ACNC: 14.8 SEC — HIGH (ref 10.6–13.6)
PROTHROM AB SERPL-ACNC: 15.2 SEC — HIGH (ref 10.6–13.6)
PROTHROM AB SERPL-ACNC: 15.6 SEC — HIGH (ref 10.6–13.6)
PROTHROM AB SERPL-ACNC: 15.8 SEC — HIGH (ref 10.6–13.6)
PROTHROM AB SERPL-ACNC: 16.2 SEC — HIGH (ref 10.6–13.6)
RBC # BLD: 2.96 M/UL — LOW (ref 4.2–5.8)
RBC # BLD: 3.03 M/UL — LOW (ref 4.2–5.8)
RBC # BLD: 3.18 M/UL — LOW (ref 4.2–5.8)
RBC # BLD: 3.25 M/UL — LOW (ref 4.2–5.8)
RBC # BLD: 3.3 M/UL — LOW (ref 4.2–5.8)
RBC # FLD: 13.2 % — SIGNIFICANT CHANGE UP (ref 10.3–14.5)
RBC # FLD: 13.3 % — SIGNIFICANT CHANGE UP (ref 10.3–14.5)
RBC # FLD: 13.4 % — SIGNIFICANT CHANGE UP (ref 10.3–14.5)
RBC # FLD: 13.5 % — SIGNIFICANT CHANGE UP (ref 10.3–14.5)
RBC # FLD: 13.6 % — SIGNIFICANT CHANGE UP (ref 10.3–14.5)
SAO2 % BLDV: 63.8 % — SIGNIFICANT CHANGE UP
SAO2 % BLDV: 64.2 % — SIGNIFICANT CHANGE UP
SODIUM SERPL-SCNC: 133 MMOL/L — LOW (ref 135–145)
SODIUM SERPL-SCNC: 134 MMOL/L — LOW (ref 135–145)
SODIUM SERPL-SCNC: 137 MMOL/L — SIGNIFICANT CHANGE UP (ref 135–145)
SODIUM SERPL-SCNC: 138 MMOL/L — SIGNIFICANT CHANGE UP (ref 135–145)
SODIUM SERPL-SCNC: 140 MMOL/L — SIGNIFICANT CHANGE UP (ref 135–145)
WBC # BLD: 10.16 K/UL — SIGNIFICANT CHANGE UP (ref 3.8–10.5)
WBC # BLD: 10.72 K/UL — HIGH (ref 3.8–10.5)
WBC # BLD: 11.11 K/UL — HIGH (ref 3.8–10.5)
WBC # BLD: 11.48 K/UL — HIGH (ref 3.8–10.5)
WBC # BLD: 12.12 K/UL — HIGH (ref 3.8–10.5)
WBC # FLD AUTO: 10.16 K/UL — SIGNIFICANT CHANGE UP (ref 3.8–10.5)
WBC # FLD AUTO: 10.72 K/UL — HIGH (ref 3.8–10.5)
WBC # FLD AUTO: 11.11 K/UL — HIGH (ref 3.8–10.5)
WBC # FLD AUTO: 11.48 K/UL — HIGH (ref 3.8–10.5)
WBC # FLD AUTO: 12.12 K/UL — HIGH (ref 3.8–10.5)

## 2021-07-14 PROCEDURE — 71045 X-RAY EXAM CHEST 1 VIEW: CPT | Mod: 26

## 2021-07-14 PROCEDURE — 99292 CRITICAL CARE ADDL 30 MIN: CPT

## 2021-07-14 PROCEDURE — 99291 CRITICAL CARE FIRST HOUR: CPT

## 2021-07-14 PROCEDURE — 88305 TISSUE EXAM BY PATHOLOGIST: CPT | Mod: 26

## 2021-07-14 RX ORDER — DEXTROSE 50 % IN WATER 50 %
15 SYRINGE (ML) INTRAVENOUS ONCE
Refills: 0 | Status: DISCONTINUED | OUTPATIENT
Start: 2021-07-14 | End: 2021-07-21

## 2021-07-14 RX ORDER — HUMAN INSULIN 100 [IU]/ML
5 INJECTION, SUSPENSION SUBCUTANEOUS ONCE
Refills: 0 | Status: COMPLETED | OUTPATIENT
Start: 2021-07-14 | End: 2021-07-14

## 2021-07-14 RX ORDER — ALBUMIN HUMAN 25 %
250 VIAL (ML) INTRAVENOUS ONCE
Refills: 0 | Status: COMPLETED | OUTPATIENT
Start: 2021-07-14 | End: 2021-07-14

## 2021-07-14 RX ORDER — PANTOPRAZOLE SODIUM 20 MG/1
40 TABLET, DELAYED RELEASE ORAL
Refills: 0 | Status: DISCONTINUED | OUTPATIENT
Start: 2021-07-14 | End: 2021-07-21

## 2021-07-14 RX ORDER — DEXTROSE 50 % IN WATER 50 %
25 SYRINGE (ML) INTRAVENOUS ONCE
Refills: 0 | Status: DISCONTINUED | OUTPATIENT
Start: 2021-07-14 | End: 2021-07-21

## 2021-07-14 RX ORDER — SODIUM BICARBONATE 1 MEQ/ML
100 SYRINGE (ML) INTRAVENOUS ONCE
Refills: 0 | Status: COMPLETED | OUTPATIENT
Start: 2021-07-14 | End: 2021-07-14

## 2021-07-14 RX ORDER — OXYCODONE HYDROCHLORIDE 5 MG/1
10 TABLET ORAL EVERY 6 HOURS
Refills: 0 | Status: DISCONTINUED | OUTPATIENT
Start: 2021-07-14 | End: 2021-07-20

## 2021-07-14 RX ORDER — SODIUM CHLORIDE 9 MG/ML
1000 INJECTION, SOLUTION INTRAVENOUS
Refills: 0 | Status: DISCONTINUED | OUTPATIENT
Start: 2021-07-14 | End: 2021-07-21

## 2021-07-14 RX ORDER — GLUCAGON INJECTION, SOLUTION 0.5 MG/.1ML
1 INJECTION, SOLUTION SUBCUTANEOUS ONCE
Refills: 0 | Status: DISCONTINUED | OUTPATIENT
Start: 2021-07-14 | End: 2021-07-21

## 2021-07-14 RX ORDER — AMIODARONE HYDROCHLORIDE 400 MG/1
0.5 TABLET ORAL
Qty: 900 | Refills: 0 | Status: DISCONTINUED | OUTPATIENT
Start: 2021-07-14 | End: 2021-07-15

## 2021-07-14 RX ORDER — GABAPENTIN 400 MG/1
300 CAPSULE ORAL EVERY 12 HOURS
Refills: 0 | Status: DISCONTINUED | OUTPATIENT
Start: 2021-07-14 | End: 2021-07-21

## 2021-07-14 RX ORDER — METOCLOPRAMIDE HCL 10 MG
10 TABLET ORAL ONCE
Refills: 0 | Status: COMPLETED | OUTPATIENT
Start: 2021-07-14 | End: 2021-07-14

## 2021-07-14 RX ORDER — VASOPRESSIN 20 [USP'U]/ML
0.03 INJECTION INTRAVENOUS
Qty: 50 | Refills: 0 | Status: DISCONTINUED | OUTPATIENT
Start: 2021-07-14 | End: 2021-07-15

## 2021-07-14 RX ORDER — DEXTROSE 50 % IN WATER 50 %
12.5 SYRINGE (ML) INTRAVENOUS ONCE
Refills: 0 | Status: DISCONTINUED | OUTPATIENT
Start: 2021-07-14 | End: 2021-07-21

## 2021-07-14 RX ORDER — CHLORHEXIDINE GLUCONATE 213 G/1000ML
1 SOLUTION TOPICAL
Refills: 0 | Status: DISCONTINUED | OUTPATIENT
Start: 2021-07-14 | End: 2021-07-19

## 2021-07-14 RX ORDER — ALBUMIN HUMAN 25 %
250 VIAL (ML) INTRAVENOUS
Refills: 0 | Status: COMPLETED | OUTPATIENT
Start: 2021-07-14 | End: 2021-07-14

## 2021-07-14 RX ORDER — BUMETANIDE 0.25 MG/ML
1 INJECTION INTRAMUSCULAR; INTRAVENOUS ONCE
Refills: 0 | Status: COMPLETED | OUTPATIENT
Start: 2021-07-14 | End: 2021-07-14

## 2021-07-14 RX ORDER — FENTANYL CITRATE 50 UG/ML
25 INJECTION INTRAVENOUS ONCE
Refills: 0 | Status: DISCONTINUED | OUTPATIENT
Start: 2021-07-14 | End: 2021-07-14

## 2021-07-14 RX ORDER — AMIODARONE HYDROCHLORIDE 400 MG/1
1 TABLET ORAL
Qty: 900 | Refills: 0 | Status: DISCONTINUED | OUTPATIENT
Start: 2021-07-14 | End: 2021-07-14

## 2021-07-14 RX ORDER — INSULIN GLARGINE 100 [IU]/ML
10 INJECTION, SOLUTION SUBCUTANEOUS AT BEDTIME
Refills: 0 | Status: DISCONTINUED | OUTPATIENT
Start: 2021-07-14 | End: 2021-07-15

## 2021-07-14 RX ORDER — POTASSIUM CHLORIDE 20 MEQ
20 PACKET (EA) ORAL ONCE
Refills: 0 | Status: COMPLETED | OUTPATIENT
Start: 2021-07-14 | End: 2021-07-14

## 2021-07-14 RX ORDER — DILTIAZEM HCL 120 MG
5 CAPSULE, EXT RELEASE 24 HR ORAL ONCE
Refills: 0 | Status: COMPLETED | OUTPATIENT
Start: 2021-07-14 | End: 2021-07-14

## 2021-07-14 RX ORDER — AMIODARONE HYDROCHLORIDE 400 MG/1
150 TABLET ORAL ONCE
Refills: 0 | Status: COMPLETED | OUTPATIENT
Start: 2021-07-14 | End: 2021-07-14

## 2021-07-14 RX ORDER — SENNA PLUS 8.6 MG/1
2 TABLET ORAL AT BEDTIME
Refills: 0 | Status: DISCONTINUED | OUTPATIENT
Start: 2021-07-14 | End: 2021-07-21

## 2021-07-14 RX ORDER — POLYETHYLENE GLYCOL 3350 17 G/17G
17 POWDER, FOR SOLUTION ORAL DAILY
Refills: 0 | Status: DISCONTINUED | OUTPATIENT
Start: 2021-07-14 | End: 2021-07-21

## 2021-07-14 RX ORDER — INSULIN LISPRO 100/ML
VIAL (ML) SUBCUTANEOUS
Refills: 0 | Status: DISCONTINUED | OUTPATIENT
Start: 2021-07-14 | End: 2021-07-21

## 2021-07-14 RX ORDER — INSULIN LISPRO 100/ML
2 VIAL (ML) SUBCUTANEOUS
Refills: 0 | Status: DISCONTINUED | OUTPATIENT
Start: 2021-07-14 | End: 2021-07-15

## 2021-07-14 RX ORDER — ACETAMINOPHEN 500 MG
1000 TABLET ORAL ONCE
Refills: 0 | Status: COMPLETED | OUTPATIENT
Start: 2021-07-14 | End: 2021-07-14

## 2021-07-14 RX ORDER — ACETAMINOPHEN 500 MG
650 TABLET ORAL EVERY 6 HOURS
Refills: 0 | Status: DISCONTINUED | OUTPATIENT
Start: 2021-07-14 | End: 2021-07-18

## 2021-07-14 RX ORDER — ALBUMIN HUMAN 25 %
50 VIAL (ML) INTRAVENOUS ONCE
Refills: 0 | Status: COMPLETED | OUTPATIENT
Start: 2021-07-14 | End: 2021-07-14

## 2021-07-14 RX ORDER — OXYCODONE HYDROCHLORIDE 5 MG/1
5 TABLET ORAL EVERY 4 HOURS
Refills: 0 | Status: DISCONTINUED | OUTPATIENT
Start: 2021-07-14 | End: 2021-07-20

## 2021-07-14 RX ORDER — CALCIUM GLUCONATE 100 MG/ML
2 VIAL (ML) INTRAVENOUS ONCE
Refills: 0 | Status: COMPLETED | OUTPATIENT
Start: 2021-07-14 | End: 2021-07-14

## 2021-07-14 RX ORDER — FENTANYL CITRATE 50 UG/ML
12.5 INJECTION INTRAVENOUS ONCE
Refills: 0 | Status: DISCONTINUED | OUTPATIENT
Start: 2021-07-14 | End: 2021-07-14

## 2021-07-14 RX ORDER — FUROSEMIDE 40 MG
20 TABLET ORAL ONCE
Refills: 0 | Status: COMPLETED | OUTPATIENT
Start: 2021-07-14 | End: 2021-07-14

## 2021-07-14 RX ADMIN — PANTOPRAZOLE SODIUM 40 MILLIGRAM(S): 20 TABLET, DELAYED RELEASE ORAL at 12:19

## 2021-07-14 RX ADMIN — BUMETANIDE 1 MILLIGRAM(S): 0.25 INJECTION INTRAMUSCULAR; INTRAVENOUS at 14:28

## 2021-07-14 RX ADMIN — Medication 2000 MILLIGRAM(S): at 06:32

## 2021-07-14 RX ADMIN — HEPARIN SODIUM 5000 UNIT(S): 5000 INJECTION INTRAVENOUS; SUBCUTANEOUS at 14:14

## 2021-07-14 RX ADMIN — Medication 200 GRAM(S): at 13:23

## 2021-07-14 RX ADMIN — HEPARIN SODIUM 5000 UNIT(S): 5000 INJECTION INTRAVENOUS; SUBCUTANEOUS at 06:32

## 2021-07-14 RX ADMIN — OXYCODONE HYDROCHLORIDE 10 MILLIGRAM(S): 5 TABLET ORAL at 19:47

## 2021-07-14 RX ADMIN — INSULIN GLARGINE 10 UNIT(S): 100 INJECTION, SOLUTION SUBCUTANEOUS at 21:52

## 2021-07-14 RX ADMIN — OXYCODONE HYDROCHLORIDE 10 MILLIGRAM(S): 5 TABLET ORAL at 23:26

## 2021-07-14 RX ADMIN — Medication 4: at 22:20

## 2021-07-14 RX ADMIN — CHLORHEXIDINE GLUCONATE 1 APPLICATION(S): 213 SOLUTION TOPICAL at 14:15

## 2021-07-14 RX ADMIN — Medication 100 MILLIEQUIVALENT(S): at 05:17

## 2021-07-14 RX ADMIN — Medication 2000 MILLIGRAM(S): at 21:52

## 2021-07-14 RX ADMIN — Medication 1000 MILLIGRAM(S): at 06:51

## 2021-07-14 RX ADMIN — FENTANYL CITRATE 25 MICROGRAM(S): 50 INJECTION INTRAVENOUS at 10:09

## 2021-07-14 RX ADMIN — Medication 400 MILLIGRAM(S): at 06:33

## 2021-07-14 RX ADMIN — FENTANYL CITRATE 12.5 MICROGRAM(S): 50 INJECTION INTRAVENOUS at 14:15

## 2021-07-14 RX ADMIN — Medication 4: at 16:28

## 2021-07-14 RX ADMIN — OXYCODONE HYDROCHLORIDE 10 MILLIGRAM(S): 5 TABLET ORAL at 17:08

## 2021-07-14 RX ADMIN — VASOPRESSIN 1.8 UNIT(S)/MIN: 20 INJECTION INTRAVENOUS at 22:44

## 2021-07-14 RX ADMIN — FENTANYL CITRATE 25 MICROGRAM(S): 50 INJECTION INTRAVENOUS at 01:14

## 2021-07-14 RX ADMIN — Medication 20 MILLIGRAM(S): at 02:15

## 2021-07-14 RX ADMIN — Medication 5 MILLIGRAM(S): at 13:30

## 2021-07-14 RX ADMIN — Medication 10 MILLIGRAM(S): at 05:15

## 2021-07-14 RX ADMIN — AMIODARONE HYDROCHLORIDE 600 MILLIGRAM(S): 400 TABLET ORAL at 19:46

## 2021-07-14 RX ADMIN — Medication 100 MILLIEQUIVALENT(S): at 13:24

## 2021-07-14 RX ADMIN — Medication 50 MILLILITER(S): at 19:52

## 2021-07-14 RX ADMIN — Medication 125 MILLILITER(S): at 01:25

## 2021-07-14 RX ADMIN — Medication 125 MILLILITER(S): at 01:16

## 2021-07-14 RX ADMIN — Medication 2 UNIT(S): at 12:00

## 2021-07-14 RX ADMIN — Medication 2 UNIT(S): at 16:28

## 2021-07-14 RX ADMIN — Medication 125 MILLILITER(S): at 09:58

## 2021-07-14 RX ADMIN — SENNA PLUS 2 TABLET(S): 8.6 TABLET ORAL at 21:52

## 2021-07-14 RX ADMIN — ATORVASTATIN CALCIUM 40 MILLIGRAM(S): 80 TABLET, FILM COATED ORAL at 21:52

## 2021-07-14 RX ADMIN — FENTANYL CITRATE 25 MICROGRAM(S): 50 INJECTION INTRAVENOUS at 09:58

## 2021-07-14 RX ADMIN — Medication 100 MILLIEQUIVALENT(S): at 06:32

## 2021-07-14 RX ADMIN — HUMAN INSULIN 5 UNIT(S): 100 INJECTION, SUSPENSION SUBCUTANEOUS at 09:45

## 2021-07-14 RX ADMIN — Medication 4: at 12:13

## 2021-07-14 RX ADMIN — POLYETHYLENE GLYCOL 3350 17 GRAM(S): 17 POWDER, FOR SOLUTION ORAL at 12:19

## 2021-07-14 RX ADMIN — Medication 2000 MILLIGRAM(S): at 14:14

## 2021-07-14 RX ADMIN — AMIODARONE HYDROCHLORIDE 16.7 MG/MIN: 400 TABLET ORAL at 23:25

## 2021-07-14 RX ADMIN — GABAPENTIN 300 MILLIGRAM(S): 400 CAPSULE ORAL at 19:46

## 2021-07-14 RX ADMIN — FENTANYL CITRATE 12.5 MICROGRAM(S): 50 INJECTION INTRAVENOUS at 13:20

## 2021-07-14 RX ADMIN — FENTANYL CITRATE 25 MICROGRAM(S): 50 INJECTION INTRAVENOUS at 01:17

## 2021-07-14 RX ADMIN — Medication 81 MILLIGRAM(S): at 12:19

## 2021-07-14 NOTE — PROGRESS NOTE ADULT - SUBJECTIVE AND OBJECTIVE BOX
CTICU  CRITICAL  CARE  attending     Hand off received 					   Pertinent clinical, laboratory, radiographic, hemodynamic, echocardiographic, respiratory data, microbiologic data and chart were reviewed and analyzed frequently throughout the course of the day and night  Patient seen and examined with CTS/ SH attending at bedside  Pt is a 75y , Male, HEALTH ISSUES - PROBLEM Dx:      , FAMILY HISTORY:  No pertinent family history in first degree relatives    PAST MEDICAL & SURGICAL HISTORY:  Hypertension    Hyperlipidemia    Afib    Diabetes mellitus    Stage 3 chronic kidney disease    H/O bacterial endocarditis    Severe aortic insufficiency    Moderate mitral regurgitation    S/P rotator cuff repair    S/P tonsillectomy      Patient is a 75y old  Male who presents with a chief complaint of Cardiac cath (13 Jul 2021 23:48)      14 system review limited by mentation and multiorgan morbidity     Vital signs, hemodynamic and respiratory parameters were reviewed from the bedside nursing flowsheet.  ICU Vital Signs Last 24 Hrs  T(C): 37.1 (14 Jul 2021 14:00), Max: 37.9 (13 Jul 2021 21:00)  T(F): 98.7 (14 Jul 2021 14:00), Max: 100.2 (13 Jul 2021 21:00)  HR: 99 (14 Jul 2021 14:00) (62 - 99)  BP: 110/58 (13 Jul 2021 17:00) (110/58 - 110/58)  BP(mean): 80 (13 Jul 2021 17:00) (80 - 80)  ABP: 129/49 (14 Jul 2021 14:00) (94/43 - 190/59)  ABP(mean): 70 (14 Jul 2021 14:00) (57 - 97)  RR: 20 (14 Jul 2021 13:00) (16 - 21)  SpO2: 98% (14 Jul 2021 14:00) (95% - 100%)    Adult Advanced Hemodynamics Last 24 Hrs  CVP(mm Hg): 1 (14 Jul 2021 14:00) (1 - 15)  CVP(cm H2O): --  CO: 5 (14 Jul 2021 05:00) (4.4 - 6.2)  CI: 2.5 (14 Jul 2021 05:00) (2.3 - 3.1)  PA: 39/17 (14 Jul 2021 05:00) (21/8 - 42/15)  PA(mean): 26 (14 Jul 2021 05:00) (14 - 26)  PCWP: --  SVR: 1118 (14 Jul 2021 05:00) (902 - 1290)  SVRI: 2237 (14 Jul 2021 05:00) (0818 - 2496)  PVR: --  PVRI: --, ABG - ( 14 Jul 2021 09:59 )  pH, Arterial: 7.41  pH, Blood: x     /  pCO2: 28    /  pO2: 134   / HCO3: 18    / Base Excess: -5.5  /  SaO2: 98.9              Mode: standby    Intake and output was reviewed and the fluid balance was calculated  Daily     Daily   I&O's Summary    13 Jul 2021 07:01  -  14 Jul 2021 07:00  --------------------------------------------------------  IN: 3376 mL / OUT: 2400 mL / NET: 976 mL    14 Jul 2021 07:01  -  14 Jul 2021 14:56  --------------------------------------------------------  IN: 907.6 mL / OUT: 470 mL / NET: 437.6 mL        All lines and drain sites were assessed  Glycemic trend was reviewedCAPILLARY BLOOD GLUCOSE      POCT Blood Glucose.: 146 mg/dL (14 Jul 2021 07:47)    No acute change in focality  Auscultation of the chest reveals equal bs  Abdomen is soft  Extremities are warm and well perfused  Wounds appear clean and unremarkable  Antibiotics are periop    labs  CBC Full  -  ( 14 Jul 2021 10:01 )  WBC Count : 12.12 K/uL  RBC Count : 3.18 M/uL  Hemoglobin : 9.5 g/dL  Hematocrit : 27.3 %  Platelet Count - Automated : 99 K/uL  Mean Cell Volume : 85.8 fl  Mean Cell Hemoglobin : 29.9 pg  Mean Cell Hemoglobin Concentration : 34.8 gm/dL  Auto Neutrophil # : x  Auto Lymphocyte # : x  Auto Monocyte # : x  Auto Eosinophil # : x  Auto Basophil # : x  Auto Neutrophil % : x  Auto Lymphocyte % : x  Auto Monocyte % : x  Auto Eosinophil % : x  Auto Basophil % : x    07-14    138  |  104  |  26<H>  ----------------------------<  224<H>  4.4   |  20<L>  |  1.53<H>    Ca    8.1<L>      14 Jul 2021 10:01  Phos  3.0     07-14  Mg     2.3     07-14    TPro  5.8<L>  /  Alb  4.0  /  TBili  0.5  /  DBili  x   /  AST  59<H>  /  ALT  11  /  AlkPhos  27<L>  07-14    PT/INR - ( 14 Jul 2021 10:01 )   PT: 15.8 sec;   INR: 1.33          PTT - ( 14 Jul 2021 10:01 )  PTT:41.9 sec  The current medications were reviewed   MEDICATIONS  (STANDING):  aspirin enteric coated 81 milliGRAM(s) Oral daily  atorvastatin 40 milliGRAM(s) Oral at bedtime  ceFAZolin  Injectable. 2000 milliGRAM(s) IV Push every 8 hours  chlorhexidine 2% Cloths 1 Application(s) Topical <User Schedule>  dextrose 40% Gel 15 Gram(s) Oral once  dextrose 5%. 1000 milliLiter(s) (50 mL/Hr) IV Continuous <Continuous>  dextrose 5%. 1000 milliLiter(s) (100 mL/Hr) IV Continuous <Continuous>  dextrose 50% Injectable 25 Gram(s) IV Push once  dextrose 50% Injectable 12.5 Gram(s) IV Push once  dextrose 50% Injectable 25 Gram(s) IV Push once  gabapentin 300 milliGRAM(s) Oral every 12 hours  glucagon  Injectable 1 milliGRAM(s) IntraMuscular once  heparin   Injectable 5000 Unit(s) SubCutaneous every 8 hours  insulin glargine Injectable (LANTUS) 10 Unit(s) SubCutaneous at bedtime  insulin lispro (ADMELOG) corrective regimen sliding scale   SubCutaneous Before meals and at bedtime  insulin lispro Injectable (ADMELOG) 2 Unit(s) SubCutaneous three times a day before meals  milrinone Infusion 0.25 MICROgram(s)/kG/Min (6.2 mL/Hr) IV Continuous <Continuous>  pantoprazole    Tablet 40 milliGRAM(s) Oral before breakfast  polyethylene glycol 3350 17 Gram(s) Oral daily  senna 2 Tablet(s) Oral at bedtime  sodium chloride 0.9%. 1000 milliLiter(s) (10 mL/Hr) IV Continuous <Continuous>    MEDICATIONS  (PRN):  acetaminophen   Tablet .. 650 milliGRAM(s) Oral every 6 hours PRN Mild Pain (1 - 3)  oxyCODONE    IR 5 milliGRAM(s) Oral every 4 hours PRN Moderate Pain (4 - 6)  oxyCODONE    IR 10 milliGRAM(s) Oral every 6 hours PRN Severe Pain (7 - 10)       PROBLEM LIST/ ASSESSMENT:  HEALTH ISSUES - PROBLEM Dx:      ,   Patient is a 75y old  Male who presents with a chief complaint of Cardiac cath (13 Jul 2021 23:48)     s/p cardiac surgery                My plan includes :  close hemodynamic, ventilatory and drain monitoring and management per post op routine    Monitor for arrhythmias and monitor parameters for organ perfusion  beta blockade not administered due to hemodynamic instability and bradycardia  monitor neurologic status  Head of the bed should remain elevated to 45 deg .   chest PT and IS will be encouraged  monitor adequacy of oxygenation and ventilation and attempt to wean oxygen  antibiotic regimen will be tailored to the clinical, laboratory and microbiologic data  Nutritional goals will be met using po eventually , ensure adequate caloric intake and montior the same  Stress ulcer and VTE prophylaxis will be achieved    Glycemic control is satisfactory  Electrolytes have been repleted as necessary and wound care has been carried out. Pain control has been achieved.   agressive physical therapy and early mobility and ambulation goals will be met   The family was updated about the course and plan  CRITICAL CARE TIME personally provided by me  in evaluation and management, reassessments, review and interpretation of labs and x-rays, ventilator and hemodynamic management, formulating a plan and coordinating care: ___90____ MIN.  Time does not include procedural time. Time spent was non routine post-operarive caRE and included multiple and repeated evaluations at the bedside  CTICU ATTENDING     					    Renato Kelley MD

## 2021-07-14 NOTE — PROGRESS NOTE ADULT - SUBJECTIVE AND OBJECTIVE BOX
CTICU  CRITICAL  CARE  attending     Hand off received 					   Pertinent clinical, laboratory, radiographic, hemodynamic, echocardiographic, respiratory data, microbiologic data and chart were reviewed and analyzed frequently throughout the course of the day and night    75 years old male with DM, HTN, HLD, Afib (on Xarelto, last dose 7/10/21), CKD III, history of bacterial endocarditis 2015 treated with IV Abx, H/O moderate MR and severe AI.  He is very active, he is an avid bike rider with minimal symptoms.  He denies fever, chills, cough, CP, palpitations, SOB, PND/orthopnea, abdominal pain, N/V/D, dizziness, syncope.   Recent ECHO: Normal EF, moderate to severe LV dilatation, severe AI. Bicuspid AV, moderate MR.   In light of pt's risk factors and known valvular disease pt is referred to [facility] for AVR  Cardiac cath: No CAD.    S/P AVR  S/P mitral valve repair.       FAMILY HISTORY:  No pertinent family history in first degree relatives    PAST MEDICAL & SURGICAL HISTORY:  Hypertension  Hyperlipidemia  Afib  Diabetes mellitus  Stage 3 chronic kidney disease  H/O bacterial endocarditis  Severe aortic insufficiency  Moderate mitral regurgitation  S/P rotator cuff repair  S/P tonsillectomy          14 system review was unremarkable    Vital signs, hemodynamic and respiratory parameters were reviewed from the bedside nursing flow sheet.  ICU Vital Signs Last 24 Hrs  T(C): 36.8 (14 Jul 2021 21:16), Max: 37.1 (14 Jul 2021 14:00)  T(F): 98.2 (14 Jul 2021 21:16), Max: 98.7 (14 Jul 2021 14:00)  HR: 114 (14 Jul 2021 23:00) (62 - 139)  BP: 95/52 (14 Jul 2021 22:00) (95/52 - 104/58)  BP(mean): 66 (14 Jul 2021 22:00) (66 - 78)  ABP: 130/66 (14 Jul 2021 23:00) (96/67 - 190/59)  ABP(mean): 86 (14 Jul 2021 23:00) (67 - 97)  RR: 16 (14 Jul 2021 23:00) (16 - 20)  SpO2: 96% (14 Jul 2021 23:00) (95% - 100%)    Adult Advanced Hemodynamics Last 24 Hrs  CVP(mm Hg): 13 (14 Jul 2021 23:00) (-1 - 46)  CVP(cm H2O): --  CO: 5 (14 Jul 2021 05:00) (5 - 6.2)  CI: 2.5 (14 Jul 2021 05:00) (2.5 - 3.1)  PA: 39/17 (14 Jul 2021 05:00) (35/9 - 42/15)  PA(mean): 26 (14 Jul 2021 05:00) (19 - 26)  PCWP: --  SVR: 1118 (14 Jul 2021 05:00) (902 - 1118)  SVRI: 2237 (14 Jul 2021 05:00) (1804 - 2237)  PVR: --  PVRI: --, ABG - ( 14 Jul 2021 22:10 )  pH, Arterial: 7.46  pH, Blood: x     /  pCO2: 30    /  pO2: 114   / HCO3: 21    / Base Excess: -1.5  /  SaO2: 99.0                Intake and output was reviewed and the fluid balance was calculated  Daily     Daily   I&O's Summary    13 Jul 2021 07:01  -  14 Jul 2021 07:00  --------------------------------------------------------  IN: 3376 mL / OUT: 2400 mL / NET: 976 mL    14 Jul 2021 07:01  -  14 Jul 2021 23:30  --------------------------------------------------------  IN: 1512 mL / OUT: 1930 mL / NET: -418 mL        All lines and drain sites were assessed    Neuro: No focal motor deficit.  Neck: No JVD.  CVS: S1, S2, No S3.  Lungs: Good air entry bilaterally.  Abd: Soft. No tenderness. + Bowel sounds.  Vascular: + DP/PT.  Extremities: No edema.  Lymphatic: Normal.  Skin: No abnormalities.      labs  CBC Full  -  ( 14 Jul 2021 22:11 )  WBC Count : 11.11 K/uL  RBC Count : 3.03 M/uL  Hemoglobin : 9.3 g/dL  Hematocrit : 26.2 %  Platelet Count - Automated : 82 K/uL  Mean Cell Volume : 86.5 fl  Mean Cell Hemoglobin : 30.7 pg  Mean Cell Hemoglobin Concentration : 35.5 gm/dL  Auto Neutrophil # : x  Auto Lymphocyte # : x  Auto Monocyte # : x  Auto Eosinophil # : x  Auto Basophil # : x  Auto Neutrophil % : x  Auto Lymphocyte % : x  Auto Monocyte % : x  Auto Eosinophil % : x  Auto Basophil % : x    07-14    134<L>  |  100  |  25<H>  ----------------------------<  252<H>  4.0   |  23  |  1.58<H>    Ca    8.3<L>      14 Jul 2021 22:11  Phos  3.5     07-14  Mg     2.1     07-14    TPro  5.6<L>  /  Alb  3.8  /  TBili  0.5  /  DBili  x   /  AST  41<H>  /  ALT  8<L>  /  AlkPhos  28<L>  07-14    PT/INR - ( 14 Jul 2021 22:12 )   PT: 15.2 sec;   INR: 1.28          PTT - ( 14 Jul 2021 22:12 )  PTT:38.1 sec  The current medications were reviewed   MEDICATIONS  (STANDING):  aMIOdarone Infusion 0.5 mG/Min (16.7 mL/Hr) IV Continuous <Continuous>  aspirin enteric coated 81 milliGRAM(s) Oral daily  atorvastatin 40 milliGRAM(s) Oral at bedtime  ceFAZolin  Injectable. 2000 milliGRAM(s) IV Push every 8 hours  chlorhexidine 2% Cloths 1 Application(s) Topical <User Schedule>  dextrose 40% Gel 15 Gram(s) Oral once  dextrose 5%. 1000 milliLiter(s) (50 mL/Hr) IV Continuous <Continuous>  dextrose 5%. 1000 milliLiter(s) (100 mL/Hr) IV Continuous <Continuous>  dextrose 50% Injectable 25 Gram(s) IV Push once  dextrose 50% Injectable 25 Gram(s) IV Push once  dextrose 50% Injectable 12.5 Gram(s) IV Push once  gabapentin 300 milliGRAM(s) Oral every 12 hours  glucagon  Injectable 1 milliGRAM(s) IntraMuscular once  heparin   Injectable 5000 Unit(s) SubCutaneous every 8 hours  insulin glargine Injectable (LANTUS) 10 Unit(s) SubCutaneous at bedtime  insulin lispro (ADMELOG) corrective regimen sliding scale   SubCutaneous Before meals and at bedtime  insulin lispro Injectable (ADMELOG) 2 Unit(s) SubCutaneous three times a day before meals  milrinone Infusion 0.25 MICROgram(s)/kG/Min (6.2 mL/Hr) IV Continuous <Continuous>  pantoprazole    Tablet 40 milliGRAM(s) Oral before breakfast  polyethylene glycol 3350 17 Gram(s) Oral daily  senna 2 Tablet(s) Oral at bedtime  sodium chloride 0.9%. 1000 milliLiter(s) (10 mL/Hr) IV Continuous <Continuous>  vasopressin Infusion 0.03 Unit(s)/Min (1.8 mL/Hr) IV Continuous <Continuous>    MEDICATIONS  (PRN):  acetaminophen   Tablet .. 650 milliGRAM(s) Oral every 6 hours PRN Mild Pain (1 - 3)  oxyCODONE    IR 5 milliGRAM(s) Oral every 4 hours PRN Moderate Pain (4 - 6)  oxyCODONE    IR 10 milliGRAM(s) Oral every 6 hours PRN Severe Pain (7 - 10)        75 years old male with severe AI and moderate MR.  S/P AVR  S/P MV repair.  S/P  maze  S/P occlusion of LA appendage.  Uncontrolled DM  Metabolic acidosis.  NELIA.  Hyponatremia.  Atrial Fibrillation with rapid ventricular response.   Hemodynamically stable.  Good oxygenation.  Fair urine out put.        My plan includes :  D/C IV epinephrine.  Continue IV milrinone.  IV amiodarone  Statin and Betablocker.  OPTIMIZE Glycemic control.  Close hemodynamic, ventilatory and drain monitoring and management  Monitor for arrhythmias and monitor parameters for organ perfusion  Monitor neurologic status  Monitor renal function.  Head of the bed should remain elevated to 45 deg .   Chest PT and IS will be encouraged  Monitor adequacy of oxygenation and ventilation and attempt to wean oxygen  Nutritional goals will be met using po eventually , ensure adequate caloric intake and monitor the same  Stress ulcer and VTE prophylaxis will be achieved.  Electrolytes have been repleted as necessary and wound care has been carried out. Pain control has been achieved.   Aggressive physical therapy and early mobility and ambulation goals will be met   The family was updated about the course and plan  CRITICAL CARE TIME SPENT in evaluation and management, reassessments, review and interpretation of labs and x-rays, ventilator and hemodynamic management, formulating a plan and coordinating care: ___30____ MIN.  Time does not include procedural time.  CTICU ATTENDING     					    Lemuel Wright MD

## 2021-07-15 LAB
ALBUMIN SERPL ELPH-MCNC: 4 G/DL — SIGNIFICANT CHANGE UP (ref 3.3–5)
ALP SERPL-CCNC: 30 U/L — LOW (ref 40–120)
ALT FLD-CCNC: 8 U/L — LOW (ref 10–45)
ANION GAP SERPL CALC-SCNC: 11 MMOL/L — SIGNIFICANT CHANGE UP (ref 5–17)
ANION GAP SERPL CALC-SCNC: 11 MMOL/L — SIGNIFICANT CHANGE UP (ref 5–17)
ANION GAP SERPL CALC-SCNC: 12 MMOL/L — SIGNIFICANT CHANGE UP (ref 5–17)
APTT BLD: 30.7 SEC — SIGNIFICANT CHANGE UP (ref 27.5–35.5)
APTT BLD: 32.6 SEC — SIGNIFICANT CHANGE UP (ref 27.5–35.5)
APTT BLD: 34.6 SEC — SIGNIFICANT CHANGE UP (ref 27.5–35.5)
AST SERPL-CCNC: 36 U/L — SIGNIFICANT CHANGE UP (ref 10–40)
BILIRUB SERPL-MCNC: 0.6 MG/DL — SIGNIFICANT CHANGE UP (ref 0.2–1.2)
BUN SERPL-MCNC: 23 MG/DL — SIGNIFICANT CHANGE UP (ref 7–23)
BUN SERPL-MCNC: 27 MG/DL — HIGH (ref 7–23)
BUN SERPL-MCNC: 28 MG/DL — HIGH (ref 7–23)
CALCIUM SERPL-MCNC: 7.9 MG/DL — LOW (ref 8.4–10.5)
CALCIUM SERPL-MCNC: 8.1 MG/DL — LOW (ref 8.4–10.5)
CALCIUM SERPL-MCNC: 8.1 MG/DL — LOW (ref 8.4–10.5)
CHLORIDE SERPL-SCNC: 100 MMOL/L — SIGNIFICANT CHANGE UP (ref 96–108)
CHLORIDE SERPL-SCNC: 97 MMOL/L — SIGNIFICANT CHANGE UP (ref 96–108)
CHLORIDE SERPL-SCNC: 98 MMOL/L — SIGNIFICANT CHANGE UP (ref 96–108)
CO2 SERPL-SCNC: 23 MMOL/L — SIGNIFICANT CHANGE UP (ref 22–31)
CREAT SERPL-MCNC: 1.34 MG/DL — HIGH (ref 0.5–1.3)
CREAT SERPL-MCNC: 1.49 MG/DL — HIGH (ref 0.5–1.3)
CREAT SERPL-MCNC: 1.7 MG/DL — HIGH (ref 0.5–1.3)
GAS PNL BLDA: SIGNIFICANT CHANGE UP
GAS PNL BLDV: SIGNIFICANT CHANGE UP
GLUCOSE BLDC GLUCOMTR-MCNC: 229 MG/DL — HIGH (ref 70–99)
GLUCOSE BLDC GLUCOMTR-MCNC: 245 MG/DL — HIGH (ref 70–99)
GLUCOSE BLDC GLUCOMTR-MCNC: 267 MG/DL — HIGH (ref 70–99)
GLUCOSE SERPL-MCNC: 226 MG/DL — HIGH (ref 70–99)
GLUCOSE SERPL-MCNC: 235 MG/DL — HIGH (ref 70–99)
GLUCOSE SERPL-MCNC: 259 MG/DL — HIGH (ref 70–99)
HCT VFR BLD CALC: 25.8 % — LOW (ref 39–50)
HCT VFR BLD CALC: 25.9 % — LOW (ref 39–50)
HCT VFR BLD CALC: 26.9 % — LOW (ref 39–50)
HGB BLD-MCNC: 9.1 G/DL — LOW (ref 13–17)
HGB BLD-MCNC: 9.1 G/DL — LOW (ref 13–17)
HGB BLD-MCNC: 9.3 G/DL — LOW (ref 13–17)
INR BLD: 1.16 — SIGNIFICANT CHANGE UP (ref 0.88–1.16)
INR BLD: 1.27 — HIGH (ref 0.88–1.16)
MAGNESIUM SERPL-MCNC: 2 MG/DL — SIGNIFICANT CHANGE UP (ref 1.6–2.6)
MAGNESIUM SERPL-MCNC: 2.4 MG/DL — SIGNIFICANT CHANGE UP (ref 1.6–2.6)
MAGNESIUM SERPL-MCNC: 2.5 MG/DL — SIGNIFICANT CHANGE UP (ref 1.6–2.6)
MCHC RBC-ENTMCNC: 30 PG — SIGNIFICANT CHANGE UP (ref 27–34)
MCHC RBC-ENTMCNC: 30 PG — SIGNIFICANT CHANGE UP (ref 27–34)
MCHC RBC-ENTMCNC: 30.2 PG — SIGNIFICANT CHANGE UP (ref 27–34)
MCHC RBC-ENTMCNC: 34.6 GM/DL — SIGNIFICANT CHANGE UP (ref 32–36)
MCHC RBC-ENTMCNC: 35.1 GM/DL — SIGNIFICANT CHANGE UP (ref 32–36)
MCHC RBC-ENTMCNC: 35.3 GM/DL — SIGNIFICANT CHANGE UP (ref 32–36)
MCV RBC AUTO: 85.5 FL — SIGNIFICANT CHANGE UP (ref 80–100)
MCV RBC AUTO: 85.7 FL — SIGNIFICANT CHANGE UP (ref 80–100)
MCV RBC AUTO: 86.8 FL — SIGNIFICANT CHANGE UP (ref 80–100)
NRBC # BLD: 0 /100 WBCS — SIGNIFICANT CHANGE UP (ref 0–0)
PHOSPHATE SERPL-MCNC: 3.5 MG/DL — SIGNIFICANT CHANGE UP (ref 2.5–4.5)
PLATELET # BLD AUTO: 73 K/UL — LOW (ref 150–400)
PLATELET # BLD AUTO: 82 K/UL — LOW (ref 150–400)
PLATELET # BLD AUTO: 93 K/UL — LOW (ref 150–400)
POTASSIUM SERPL-MCNC: 4.2 MMOL/L — SIGNIFICANT CHANGE UP (ref 3.5–5.3)
POTASSIUM SERPL-MCNC: 4.2 MMOL/L — SIGNIFICANT CHANGE UP (ref 3.5–5.3)
POTASSIUM SERPL-MCNC: 4.4 MMOL/L — SIGNIFICANT CHANGE UP (ref 3.5–5.3)
POTASSIUM SERPL-SCNC: 4.2 MMOL/L — SIGNIFICANT CHANGE UP (ref 3.5–5.3)
POTASSIUM SERPL-SCNC: 4.2 MMOL/L — SIGNIFICANT CHANGE UP (ref 3.5–5.3)
POTASSIUM SERPL-SCNC: 4.4 MMOL/L — SIGNIFICANT CHANGE UP (ref 3.5–5.3)
PROT SERPL-MCNC: 5.7 G/DL — LOW (ref 6–8.3)
PROTHROM AB SERPL-ACNC: 13.8 SEC — HIGH (ref 10.6–13.6)
PROTHROM AB SERPL-ACNC: 15.1 SEC — HIGH (ref 10.6–13.6)
RBC # BLD: 3.01 M/UL — LOW (ref 4.2–5.8)
RBC # BLD: 3.03 M/UL — LOW (ref 4.2–5.8)
RBC # BLD: 3.1 M/UL — LOW (ref 4.2–5.8)
RBC # FLD: 13.2 % — SIGNIFICANT CHANGE UP (ref 10.3–14.5)
SODIUM SERPL-SCNC: 132 MMOL/L — LOW (ref 135–145)
SODIUM SERPL-SCNC: 132 MMOL/L — LOW (ref 135–145)
SODIUM SERPL-SCNC: 134 MMOL/L — LOW (ref 135–145)
WBC # BLD: 11.08 K/UL — HIGH (ref 3.8–10.5)
WBC # BLD: 12.31 K/UL — HIGH (ref 3.8–10.5)
WBC # BLD: 12.67 K/UL — HIGH (ref 3.8–10.5)
WBC # FLD AUTO: 11.08 K/UL — HIGH (ref 3.8–10.5)
WBC # FLD AUTO: 12.31 K/UL — HIGH (ref 3.8–10.5)
WBC # FLD AUTO: 12.67 K/UL — HIGH (ref 3.8–10.5)

## 2021-07-15 PROCEDURE — 99291 CRITICAL CARE FIRST HOUR: CPT

## 2021-07-15 PROCEDURE — 99292 CRITICAL CARE ADDL 30 MIN: CPT

## 2021-07-15 PROCEDURE — 71045 X-RAY EXAM CHEST 1 VIEW: CPT | Mod: 26

## 2021-07-15 RX ORDER — ALBUMIN HUMAN 25 %
250 VIAL (ML) INTRAVENOUS
Refills: 0 | Status: DISCONTINUED | OUTPATIENT
Start: 2021-07-15 | End: 2021-07-19

## 2021-07-15 RX ORDER — MAGNESIUM SULFATE 500 MG/ML
2 VIAL (ML) INJECTION ONCE
Refills: 0 | Status: COMPLETED | OUTPATIENT
Start: 2021-07-15 | End: 2021-07-15

## 2021-07-15 RX ORDER — INSULIN GLARGINE 100 [IU]/ML
14 INJECTION, SOLUTION SUBCUTANEOUS AT BEDTIME
Refills: 0 | Status: DISCONTINUED | OUTPATIENT
Start: 2021-07-15 | End: 2021-07-17

## 2021-07-15 RX ORDER — BUMETANIDE 0.25 MG/ML
1 INJECTION INTRAMUSCULAR; INTRAVENOUS ONCE
Refills: 0 | Status: COMPLETED | OUTPATIENT
Start: 2021-07-15 | End: 2021-07-15

## 2021-07-15 RX ORDER — INSULIN LISPRO 100/ML
4 VIAL (ML) SUBCUTANEOUS
Refills: 0 | Status: DISCONTINUED | OUTPATIENT
Start: 2021-07-15 | End: 2021-07-17

## 2021-07-15 RX ORDER — DILTIAZEM HCL 120 MG
10 CAPSULE, EXT RELEASE 24 HR ORAL ONCE
Refills: 0 | Status: DISCONTINUED | OUTPATIENT
Start: 2021-07-15 | End: 2021-07-17

## 2021-07-15 RX ORDER — AMIODARONE HYDROCHLORIDE 400 MG/1
TABLET ORAL
Refills: 0 | Status: DISCONTINUED | OUTPATIENT
Start: 2021-07-15 | End: 2021-07-18

## 2021-07-15 RX ORDER — ALBUMIN HUMAN 25 %
250 VIAL (ML) INTRAVENOUS ONCE
Refills: 0 | Status: COMPLETED | OUTPATIENT
Start: 2021-07-15 | End: 2021-07-15

## 2021-07-15 RX ORDER — HEPARIN SODIUM 5000 [USP'U]/ML
800 INJECTION INTRAVENOUS; SUBCUTANEOUS
Qty: 25000 | Refills: 0 | Status: DISCONTINUED | OUTPATIENT
Start: 2021-07-15 | End: 2021-07-16

## 2021-07-15 RX ORDER — AMIODARONE HYDROCHLORIDE 400 MG/1
400 TABLET ORAL EVERY 8 HOURS
Refills: 0 | Status: DISCONTINUED | OUTPATIENT
Start: 2021-07-15 | End: 2021-07-18

## 2021-07-15 RX ADMIN — Medication 81 MILLIGRAM(S): at 11:03

## 2021-07-15 RX ADMIN — SENNA PLUS 2 TABLET(S): 8.6 TABLET ORAL at 21:30

## 2021-07-15 RX ADMIN — BUMETANIDE 1 MILLIGRAM(S): 0.25 INJECTION INTRAMUSCULAR; INTRAVENOUS at 11:03

## 2021-07-15 RX ADMIN — Medication 4: at 06:51

## 2021-07-15 RX ADMIN — CHLORHEXIDINE GLUCONATE 1 APPLICATION(S): 213 SOLUTION TOPICAL at 06:52

## 2021-07-15 RX ADMIN — Medication 6: at 18:59

## 2021-07-15 RX ADMIN — POLYETHYLENE GLYCOL 3350 17 GRAM(S): 17 POWDER, FOR SOLUTION ORAL at 11:03

## 2021-07-15 RX ADMIN — Medication 4 UNIT(S): at 19:00

## 2021-07-15 RX ADMIN — AMIODARONE HYDROCHLORIDE 400 MILLIGRAM(S): 400 TABLET ORAL at 21:30

## 2021-07-15 RX ADMIN — HEPARIN SODIUM 5000 UNIT(S): 5000 INJECTION INTRAVENOUS; SUBCUTANEOUS at 06:50

## 2021-07-15 RX ADMIN — GABAPENTIN 300 MILLIGRAM(S): 400 CAPSULE ORAL at 17:49

## 2021-07-15 RX ADMIN — Medication 50 GRAM(S): at 04:49

## 2021-07-15 RX ADMIN — AMIODARONE HYDROCHLORIDE 400 MILLIGRAM(S): 400 TABLET ORAL at 17:47

## 2021-07-15 RX ADMIN — ATORVASTATIN CALCIUM 40 MILLIGRAM(S): 80 TABLET, FILM COATED ORAL at 21:30

## 2021-07-15 RX ADMIN — Medication 500 MILLILITER(S): at 22:07

## 2021-07-15 RX ADMIN — INSULIN GLARGINE 14 UNIT(S): 100 INJECTION, SOLUTION SUBCUTANEOUS at 21:30

## 2021-07-15 RX ADMIN — PANTOPRAZOLE SODIUM 40 MILLIGRAM(S): 20 TABLET, DELAYED RELEASE ORAL at 06:50

## 2021-07-15 RX ADMIN — GABAPENTIN 300 MILLIGRAM(S): 400 CAPSULE ORAL at 06:50

## 2021-07-15 RX ADMIN — Medication 125 MILLILITER(S): at 17:48

## 2021-07-15 RX ADMIN — Medication 4: at 21:31

## 2021-07-15 RX ADMIN — Medication 2000 MILLIGRAM(S): at 06:50

## 2021-07-15 RX ADMIN — HEPARIN SODIUM 8 UNIT(S)/HR: 5000 INJECTION INTRAVENOUS; SUBCUTANEOUS at 17:47

## 2021-07-15 RX ADMIN — OXYCODONE HYDROCHLORIDE 10 MILLIGRAM(S): 5 TABLET ORAL at 00:31

## 2021-07-15 RX ADMIN — Medication 2 UNIT(S): at 06:51

## 2021-07-15 NOTE — PROGRESS NOTE ADULT - SUBJECTIVE AND OBJECTIVE BOX
CTICU  CRITICAL  CARE  attending     Hand off received 					   Pertinent clinical, laboratory, radiographic, hemodynamic, echocardiographic, respiratory data, microbiologic data and chart were reviewed and analyzed frequently throughout the course of the day and night        75 years old male with DM, HTN, HLD, Afib (on Xarelto, last dose 7/10/21), CKD III, history of bacterial endocarditis 2015 treated with IV Abx, H/O moderate MR and severe AI.  He is very active, he is an avid bike rider with minimal symptoms.  He denies fever, chills, cough, CP, palpitations, SOB, PND/orthopnea, abdominal pain, N/V/D, dizziness, syncope.   Recent ECHO: Normal EF, moderate to severe LV dilatation, severe AI. Bicuspid AV, moderate MR.   In light of pt's risk factors and known valvular disease pt is referred to [facility] for AVR  Cardiac cath: No CAD.    S/P AVR  S/P mitral valve repair.      FAMILY HISTORY:  No pertinent family history in first degree relatives    PAST MEDICAL & SURGICAL HISTORY:  Hypertension  Hyperlipidemia  Afib  Diabetes mellitus  Stage 3 chronic kidney disease  H/O bacterial endocarditis  Severe aortic insufficiency  Moderate mitral regurgitation  S/P rotator cuff repair  S/P tonsillectomy          14 system review was unremarkable    Vital signs, hemodynamic and respiratory parameters were reviewed from the bedside nursing flow sheet.  ICU Vital Signs Last 24 Hrs  T(C): 37.1 (15 Jul 2021 21:15), Max: 37.1 (15 Jul 2021 21:15)  T(F): 98.7 (15 Jul 2021 21:15), Max: 98.7 (15 Jul 2021 21:15)  HR: 104 (15 Jul 2021 23:00) (84 - 132)  BP: 146/61 (15 Jul 2021 16:15) (146/61 - 146/61)  BP(mean): 88 (15 Jul 2021 16:15) (88 - 88)  ABP: 145/66 (15 Jul 2021 23:00) (97/43 - 165/59)  ABP(mean): 87 (15 Jul 2021 23:00) (57 - 92)  RR: 20 (15 Jul 2021 23:00) (16 - 20)  SpO2: 97% (15 Jul 2021 23:00) (93% - 98%)    Adult Advanced Hemodynamics Last 24 Hrs  CVP(mm Hg): 32 (15 Jul 2021 23:00) (5 - 32)  CVP(cm H2O): --  CO: --  CI: --  PA: --  PA(mean): --  PCWP: --  SVR: --  SVRI: --  PVR: --  PVRI: --, ABG - ( 15 Jul 2021 04:09 )  pH, Arterial: 7.45  pH, Blood: x     /  pCO2: 33    /  pO2: 128   / HCO3: 23    / Base Excess: -0.4  /  SaO2: 98.1                Intake and output was reviewed and the fluid balance was calculated  Daily     Daily   I&O's Summary    14 Jul 2021 07:01  -  15 Jul 2021 07:00  --------------------------------------------------------  IN: 2099 mL / OUT: 2370 mL / NET: -271 mL    15 Jul 2021 07:01  -  15 Jul 2021 23:54  --------------------------------------------------------  IN: 681 mL / OUT: 565 mL / NET: 116 mL        All lines and drain sites were assessed      Neuro: Follows commands. Moves all 4 extremities.  Neck: No JVD.  CVS: S1, S2, No S3.  Lungs: Good air entry bilaterally.  Abd: Soft. No tenderness. + Bowel sounds.  Vascular: + DP/PT.  Extremities: No edema.  Lymphatic: Normal.  Skin: No abnormalities.      labs  CBC Full  -  ( 15 Jul 2021 15:46 )  WBC Count : 12.67 K/uL  RBC Count : 3.03 M/uL  Hemoglobin : 9.1 g/dL  Hematocrit : 25.9 %  Platelet Count - Automated : 93 K/uL  Mean Cell Volume : 85.5 fl  Mean Cell Hemoglobin : 30.0 pg  Mean Cell Hemoglobin Concentration : 35.1 gm/dL  Auto Neutrophil # : x  Auto Lymphocyte # : x  Auto Monocyte # : x  Auto Eosinophil # : x  Auto Basophil # : x  Auto Neutrophil % : x  Auto Lymphocyte % : x  Auto Monocyte % : x  Auto Eosinophil % : x  Auto Basophil % : x    07-15    132<L>  |  97  |  28<H>  ----------------------------<  235<H>  4.4   |  23  |  1.70<H>    Ca    8.1<L>      15 Jul 2021 15:46  Phos  3.5     07-15  Mg     2.5     07-15    TPro  5.7<L>  /  Alb  4.0  /  TBili  0.6  /  DBili  x   /  AST  36  /  ALT  8<L>  /  AlkPhos  30<L>  07-15    PT/INR - ( 15 Jul 2021 15:46 )   PT: 13.8 sec;   INR: 1.16          PTT - ( 15 Jul 2021 15:46 )  PTT:30.7 sec  The current medications were reviewed   MEDICATIONS  (STANDING):  albumin human  5% IVPB 250 milliLiter(s) IV Intermittent every 30 minutes  albumin human  5% IVPB 250 milliLiter(s) IV Intermittent every 30 minutes  aMIOdarone    Tablet 400 milliGRAM(s) Oral every 8 hours  aMIOdarone    Tablet   Oral   aspirin enteric coated 81 milliGRAM(s) Oral daily  atorvastatin 40 milliGRAM(s) Oral at bedtime  chlorhexidine 2% Cloths 1 Application(s) Topical <User Schedule>  dextrose 40% Gel 15 Gram(s) Oral once  dextrose 5%. 1000 milliLiter(s) (50 mL/Hr) IV Continuous <Continuous>  dextrose 5%. 1000 milliLiter(s) (100 mL/Hr) IV Continuous <Continuous>  dextrose 50% Injectable 25 Gram(s) IV Push once  dextrose 50% Injectable 12.5 Gram(s) IV Push once  dextrose 50% Injectable 25 Gram(s) IV Push once  diltiazem Injectable 10 milliGRAM(s) IV Push once  gabapentin 300 milliGRAM(s) Oral every 12 hours  glucagon  Injectable 1 milliGRAM(s) IntraMuscular once  heparin  Infusion 800 Unit(s)/Hr (8 mL/Hr) IV Continuous <Continuous>  insulin glargine Injectable (LANTUS) 14 Unit(s) SubCutaneous at bedtime  insulin lispro (ADMELOG) corrective regimen sliding scale   SubCutaneous Before meals and at bedtime  insulin lispro Injectable (ADMELOG) 4 Unit(s) SubCutaneous three times a day before meals  milrinone Infusion 0.25 MICROgram(s)/kG/Min (6.2 mL/Hr) IV Continuous <Continuous>  pantoprazole    Tablet 40 milliGRAM(s) Oral before breakfast  polyethylene glycol 3350 17 Gram(s) Oral daily  senna 2 Tablet(s) Oral at bedtime  sodium chloride 0.9%. 1000 milliLiter(s) (10 mL/Hr) IV Continuous <Continuous>    MEDICATIONS  (PRN):  acetaminophen   Tablet .. 650 milliGRAM(s) Oral every 6 hours PRN Mild Pain (1 - 3)  oxyCODONE    IR 5 milliGRAM(s) Oral every 4 hours PRN Moderate Pain (4 - 6)  oxyCODONE    IR 10 milliGRAM(s) Oral every 6 hours PRN Severe Pain (7 - 10)            75 years old male with severe AI and moderate MR.  S/P AVR  S/P MV repair.  S/P  maze  S/P occlusion of LA appendage.  Uncontrolled DM.  Hemodynamically stable.  Good oxygenation.  Borderline urine out put.        My plan includes :  Optimize Glycemic control.  Gentle Diuresis.  Statin Rx.  Amiodarone and Betablocker.  Close hemodynamic, ventilatory and drain monitoring and management  Monitor for arrhythmias and monitor parameters for organ perfusion  Monitor neurologic status  Monitor renal function.  Head of the bed should remain elevated to 45 deg .   Chest PT and IS will be encouraged  Monitor adequacy of oxygenation and ventilation and attempt to wean oxygen  Nutritional goals will be met using po eventually , ensure adequate caloric intake and monitor the same  Stress ulcer and VTE prophylaxis will be achieved    Glycemic control is satisfactory  Electrolytes have been repleted as necessary and wound care has been carried out. Pain control has been achieved.   Aggressive physical therapy and early mobility and ambulation goals will be met   The family was updated about the course and plan  CRITICAL CARE TIME SPENT in evaluation and management, reassessments, review and interpretation of labs and x-rays, ventilator and hemodynamic management, formulating a plan and coordinating care: ___30____ MIN.  Time does not include procedural time.  CTICU ATTENDING     					    Lemuel Wright MD                        	 CTICU  CRITICAL  CARE  attending     Hand off received 					   Pertinent clinical, laboratory, radiographic, hemodynamic, echocardiographic, respiratory data, microbiologic data and chart were reviewed and analyzed frequently throughout the course of the day and night        75 years old male with DM, HTN, HLD, Afib (on Xarelto, last dose 7/10/21), CKD III, history of bacterial endocarditis 2015 treated with IV Abx, H/O moderate MR and severe AI.  He is very active, he is an avid bike rider with minimal symptoms.  He denies fever, chills, cough, CP, palpitations, SOB, PND/orthopnea, abdominal pain, N/V/D, dizziness, syncope.   Recent ECHO: Normal EF, moderate to severe LV dilatation, severe AI. Bicuspid AV, moderate MR.   In light of pt's risk factors and known valvular disease pt is referred to [facility] for AVR  Cardiac cath: No CAD.    S/P AVR  S/P mitral valve repair.      FAMILY HISTORY:  No pertinent family history in first degree relatives    PAST MEDICAL & SURGICAL HISTORY:  Hypertension  Hyperlipidemia  Afib  Diabetes mellitus  Stage 3 chronic kidney disease  H/O bacterial endocarditis  Severe aortic insufficiency  Moderate mitral regurgitation  S/P rotator cuff repair  S/P tonsillectomy          14 system review was unremarkable    Vital signs, hemodynamic and respiratory parameters were reviewed from the bedside nursing flow sheet.  ICU Vital Signs Last 24 Hrs  T(C): 37.1 (15 Jul 2021 21:15), Max: 37.1 (15 Jul 2021 21:15)  T(F): 98.7 (15 Jul 2021 21:15), Max: 98.7 (15 Jul 2021 21:15)  HR: 104 (15 Jul 2021 23:00) (84 - 132)  BP: 146/61 (15 Jul 2021 16:15) (146/61 - 146/61)  BP(mean): 88 (15 Jul 2021 16:15) (88 - 88)  ABP: 145/66 (15 Jul 2021 23:00) (97/43 - 165/59)  ABP(mean): 87 (15 Jul 2021 23:00) (57 - 92)  RR: 20 (15 Jul 2021 23:00) (16 - 20)  SpO2: 97% (15 Jul 2021 23:00) (93% - 98%)    Adult Advanced Hemodynamics Last 24 Hrs  CVP(mm Hg): 32 (15 Jul 2021 23:00) (5 - 32)  CVP(cm H2O): --  CO: --  CI: --  PA: --  PA(mean): --  PCWP: --  SVR: --  SVRI: --  PVR: --  PVRI: --, ABG - ( 15 Jul 2021 04:09 )  pH, Arterial: 7.45  pH, Blood: x     /  pCO2: 33    /  pO2: 128   / HCO3: 23    / Base Excess: -0.4  /  SaO2: 98.1                Intake and output was reviewed and the fluid balance was calculated  Daily     Daily   I&O's Summary    14 Jul 2021 07:01  -  15 Jul 2021 07:00  --------------------------------------------------------  IN: 2099 mL / OUT: 2370 mL / NET: -271 mL    15 Jul 2021 07:01  -  15 Jul 2021 23:54  --------------------------------------------------------  IN: 681 mL / OUT: 565 mL / NET: 116 mL        All lines and drain sites were assessed      Neuro: Follows commands. Moves all 4 extremities.  Neck: No JVD.  CVS: S1, S2, No S3.  Lungs: Good air entry bilaterally.  Abd: Soft. No tenderness. + Bowel sounds.  Vascular: + DP/PT.  Extremities: No edema.  Lymphatic: Normal.  Skin: No abnormalities.      labs  CBC Full  -  ( 15 Jul 2021 15:46 )  WBC Count : 12.67 K/uL  RBC Count : 3.03 M/uL  Hemoglobin : 9.1 g/dL  Hematocrit : 25.9 %  Platelet Count - Automated : 93 K/uL  Mean Cell Volume : 85.5 fl  Mean Cell Hemoglobin : 30.0 pg  Mean Cell Hemoglobin Concentration : 35.1 gm/dL  Auto Neutrophil # : x  Auto Lymphocyte # : x  Auto Monocyte # : x  Auto Eosinophil # : x  Auto Basophil # : x  Auto Neutrophil % : x  Auto Lymphocyte % : x  Auto Monocyte % : x  Auto Eosinophil % : x  Auto Basophil % : x    07-15    132<L>  |  97  |  28<H>  ----------------------------<  235<H>  4.4   |  23  |  1.70<H>    Ca    8.1<L>      15 Jul 2021 15:46  Phos  3.5     07-15  Mg     2.5     07-15    TPro  5.7<L>  /  Alb  4.0  /  TBili  0.6  /  DBili  x   /  AST  36  /  ALT  8<L>  /  AlkPhos  30<L>  07-15    PT/INR - ( 15 Jul 2021 15:46 )   PT: 13.8 sec;   INR: 1.16          PTT - ( 15 Jul 2021 15:46 )  PTT:30.7 sec  The current medications were reviewed   MEDICATIONS  (STANDING):  albumin human  5% IVPB 250 milliLiter(s) IV Intermittent every 30 minutes  albumin human  5% IVPB 250 milliLiter(s) IV Intermittent every 30 minutes  aMIOdarone    Tablet 400 milliGRAM(s) Oral every 8 hours  aMIOdarone    Tablet   Oral   aspirin enteric coated 81 milliGRAM(s) Oral daily  atorvastatin 40 milliGRAM(s) Oral at bedtime  chlorhexidine 2% Cloths 1 Application(s) Topical <User Schedule>  dextrose 40% Gel 15 Gram(s) Oral once  dextrose 5%. 1000 milliLiter(s) (50 mL/Hr) IV Continuous <Continuous>  dextrose 5%. 1000 milliLiter(s) (100 mL/Hr) IV Continuous <Continuous>  dextrose 50% Injectable 25 Gram(s) IV Push once  dextrose 50% Injectable 12.5 Gram(s) IV Push once  dextrose 50% Injectable 25 Gram(s) IV Push once  diltiazem Injectable 10 milliGRAM(s) IV Push once  gabapentin 300 milliGRAM(s) Oral every 12 hours  glucagon  Injectable 1 milliGRAM(s) IntraMuscular once  heparin  Infusion 800 Unit(s)/Hr (8 mL/Hr) IV Continuous <Continuous>  insulin glargine Injectable (LANTUS) 14 Unit(s) SubCutaneous at bedtime  insulin lispro (ADMELOG) corrective regimen sliding scale   SubCutaneous Before meals and at bedtime  insulin lispro Injectable (ADMELOG) 4 Unit(s) SubCutaneous three times a day before meals  milrinone Infusion 0.25 MICROgram(s)/kG/Min (6.2 mL/Hr) IV Continuous <Continuous>  pantoprazole    Tablet 40 milliGRAM(s) Oral before breakfast  polyethylene glycol 3350 17 Gram(s) Oral daily  senna 2 Tablet(s) Oral at bedtime  sodium chloride 0.9%. 1000 milliLiter(s) (10 mL/Hr) IV Continuous <Continuous>    MEDICATIONS  (PRN):  acetaminophen   Tablet .. 650 milliGRAM(s) Oral every 6 hours PRN Mild Pain (1 - 3)  oxyCODONE    IR 5 milliGRAM(s) Oral every 4 hours PRN Moderate Pain (4 - 6)  oxyCODONE    IR 10 milliGRAM(s) Oral every 6 hours PRN Severe Pain (7 - 10)            75 years old male with severe AI and moderate MR.  S/P AVR  S/P MV repair.  S/P  maze  S/P occlusion of LA appendage.  Uncontrolled DM.  Atrial Fibrillation  Hemodynamically stable.  Good oxygenation.  Borderline urine out put.        My plan includes :  Optimize Glycemic control.  Gentle Diuresis.  Statin Rx.  Amiodarone and Betablocker.  EP follow up.  Close hemodynamic, ventilatory and drain monitoring and management  Monitor for arrhythmias and monitor parameters for organ perfusion  Monitor neurologic status  Monitor renal function.  Head of the bed should remain elevated to 45 deg .   Chest PT and IS will be encouraged  Monitor adequacy of oxygenation and ventilation and attempt to wean oxygen  Nutritional goals will be met using po eventually , ensure adequate caloric intake and monitor the same  Stress ulcer and VTE prophylaxis will be achieved    Glycemic control is satisfactory  Electrolytes have been repleted as necessary and wound care has been carried out. Pain control has been achieved.   Aggressive physical therapy and early mobility and ambulation goals will be met   The family was updated about the course and plan  CRITICAL CARE TIME SPENT in evaluation and management, reassessments, review and interpretation of labs and x-rays, ventilator and hemodynamic management, formulating a plan and coordinating care: ___30____ MIN.  Time does not include procedural time.  CTICU ATTENDING     					    Lemuel Wright MD

## 2021-07-15 NOTE — PHYSICAL THERAPY INITIAL EVALUATION ADULT - GENERAL OBSERVATIONS, REHAB EVAL
Spoke to RN Serena, pt cleared for PT. POD#2 MV repair via sternotomy. Pt rcvd OOB to chair, +tele, +6L NC, +central line, +Sayra, +charles x2, +light, MSI CDI. Pt agreeable to PT, reports 1/10 pain. Tolerated session fairly well, jose eduardo Mcclendon transfers, amb 50ft x3 Afib 100-150s (MD Alves aware).

## 2021-07-15 NOTE — PHYSICAL THERAPY INITIAL EVALUATION ADULT - ADDITIONAL COMMENTS
Pt lives in house with 10STE with wife. Indpt and active at baseline, reports biking 70miles per week. Denies falls and device use. H/o carpal tunnel in R wrist.

## 2021-07-15 NOTE — PHYSICAL THERAPY INITIAL EVALUATION ADULT - PERTINENT HX OF CURRENT PROBLEM, REHAB EVAL
74yo Male w/Hx HTN, HLD, Afib (Xarelto), DM, CKD III, Bacterial Endocarditis '15 - Tx Abx, Known mod MR/Severe AI presenting for eval of known valvular disease

## 2021-07-15 NOTE — PROGRESS NOTE ADULT - SUBJECTIVE AND OBJECTIVE BOX
INTERVAL HPI/OVERNIGHT EVENTS:    POD#2 AVR/MR repair/CryoMAZE; ANGELO occlusion  EF normal     76yo Male w/Hx HTN, HLD, Afib (Xarelto), DM, CKD III, Bacterial Endocarditis '15 - Tx Abx, Known mod MR/Severe AI presenting for eval of known valvular disease - active patient/no reported sxs    ECHO 6/1: normal EF, mod-severe LV dilatation, severe AI cannot exclude bicuspid AV, moderate MR. I    Cath 7/12: LM normal, LAD mild disease, LCx mild  disease, RCA dominant, mild luminal irregularities, No LV gram performed. EDP 10mmHg, No AS, R Radial TR     to OR 7/13:   IntraOp: 1 U pRBC/3L crystalloid given  arrived to ICU on Epi/Primacor   Initial CI >2.4    Extubated in short post-op period  Epi titrated down to off (LA 3 - repeat/most recent 1.3)  remains on low dose Primacor 0.25  Amiodarone infusion started (midnight)  Vaso titrated off 3a    No acute events reported overnight   HFNC - requested transition to nasal cannula this am     PMHx includes but is not limited to:   Hypertension  Hyperlipidemia  Afib  Diabetes mellitus  CKD III  Hx bacterial endocarditis  Severe aortic insufficiency  Moderate mitral regurgitation  S/P rotator cuff repair  S/P tonsillectomy    ICU Vital Signs Last 24 Hrs  T(C): 36.1 (15 Jul 2021 05:20), Max: 37.1 (14 Jul 2021 14:00)  T(F): 97 (15 Jul 2021 05:20), Max: 98.7 (14 Jul 2021 14:00)  HR: 86 (15 Jul 2021 08:37) (68 - 139) Fib alternating with periodic sinus  BP: 95/52 (14 Jul 2021 22:00) (95/52 - 104/58)  BP(mean): 66 (14 Jul 2021 22:00) (66 - 78)  ABP: 140/53 (15 Jul 2021 08:00) (96/67 - 190/59)  ABP(mean): 75 (15 Jul 2021 08:00) (67 - 97)  RR: 16 (15 Jul 2021 08:37) (16 - 20)  SpO2: 96% (15 Jul 2021 08:37) (95% - 100%)NC - 6L     Qtts:   Primacor 0.25    I&O's Summary    14 Jul 2021 07:01  -  15 Jul 2021 07:00  --------------------------------------------------------  IN: 2099 mL / OUT: 2370 mL / NET: -271 mL    15 Jul 2021 07:01  -  15 Jul 2021 09:14  --------------------------------------------------------  IN: 16 mL / OUT: 85 mL / NET: -69 mL    Physical Exam    Heart  Lungs  Abd  Ext  Chest  Neuro  Skin    LABS:                        9.1    11.08 )-----------( 73       ( 15 Jul 2021 03:40 )             25.8     07-15    134<L>  |  100  |  23  ----------------------------<  226<H>  4.2   |  23  |  1.34<H>    Ca    7.9<L>      15 Jul 2021 03:40  Phos  3.5     07-15  Mg     2.0     07-15    TPro  5.7<L>  /  Alb  4.0  /  TBili  0.6  /  DBili  x   /  AST  36  /  ALT  8<L>  /  AlkPhos  30<L>  07-15    PT/INR - ( 15 Jul 2021 03:40 )   PT: 15.1 sec;   INR: 1.27          PTT - ( 15 Jul 2021 03:40 )  PTT:34.6 sec    ABG - ( 15 Jul 2021 04:09 )  pH, Arterial: 7.45  pH, Blood: x     /  pCO2: 33    /  pO2: 128   / HCO3: 23    / Base Excess: -0.4  /  SaO2: 98.1                RADIOLOGY & ADDITIONAL STUDIES:    I have spent/provided stated minutes of critical care time to this patient:  INTERVAL HPI/OVERNIGHT EVENTS:    POD#2 AVR/MR repair/CryoMAZE; ANGELO occlusion  EF normal     76yo Male w/Hx HTN, HLD, Afib (Xarelto), DM, CKD III, Bacterial Endocarditis '15 - Tx Abx, Known mod MR/Severe AI presenting for eval of known valvular disease - active patient/no reported sxs    ECHO 6/1: normal EF, mod-severe LV dilatation, severe AI cannot exclude bicuspid AV, moderate MR. I    Cath 7/12: LM normal, LAD mild disease, LCx mild  disease, RCA dominant, mild luminal irregularities, No LV gram performed. EDP 10mmHg, No AS, R Radial TR     to OR 7/13:   IntraOp: 1 U pRBC/3L crystalloid given  arrived to ICU on Epi/Primacor   Initial CI >2.4    Extubated in short post-op period  Epi titrated down to off (LA 3 - repeat/most recent 1.3)  remains on low dose Primacor 0.25  Amiodarone infusion started (midnight)  Vaso titrated off 3a    No acute events reported overnight   HFNC - requested transition to nasal cannula this am and tolerating     Patient currently OOB in chair - motivated to get up and ambulate this am    PMHx includes but is not limited to:   Hypertension  Hyperlipidemia  Afib  Diabetes mellitus  CKD III  Hx bacterial endocarditis  Severe aortic insufficiency  Moderate mitral regurgitation  S/P rotator cuff repair  S/P tonsillectomy    ICU Vital Signs Last 24 Hrs  T(C): 36.1 (15 Jul 2021 05:20), Max: 37.1 (14 Jul 2021 14:00)  T(F): 97 (15 Jul 2021 05:20), Max: 98.7 (14 Jul 2021 14:00)  HR: 86 (15 Jul 2021 08:37) (68 - 139) Fib alternating with periodic sinus  BP: 95/52 (14 Jul 2021 22:00) (95/52 - 104/58)  BP(mean): 66 (14 Jul 2021 22:00) (66 - 78)  ABP: 140/53 (15 Jul 2021 08:00) (96/67 - 190/59)  ABP(mean): 75 (15 Jul 2021 08:00) (67 - 97)  RR: 16 (15 Jul 2021 08:37) (16 - 20)  SpO2: 96% (15 Jul 2021 08:37) (95% - 100%)NC - 6L     Qtts:   Primacor 0.25  Amio - to be transitioned to PO     I&O's Summary    14 Jul 2021 07:01  -  15 Jul 2021 07:00  --------------------------------------------------------  IN: 2099 mL / OUT: 2370 mL / NET: -271 mL    15 Jul 2021 07:01  -  15 Jul 2021 09:14  --------------------------------------------------------  IN: 16 mL / OUT: 85 mL / NET: -69 mL    Physical Exam    Heart - irregular irregular (-)rub/gallop  Lungs - CTA anterior (-) rhonchi/wheeze  Abd - (+)BS soft NTND (-)r/r/g  Ext - warm to touch; no cyanosis/clubbing  Chest - op bandage in place   Neuro - alert/oriented and interactive - non-focal   Skin - no rash     LABS:                        9.1    11.08 )-----------( 75       ( 15 Jul 2021 03:40 )             25.8     07-15    134<L>  |  100  |  23  ----------------------------<  226<H>  4.2   |  23  |  1.34<H>    Ca    7.9<L>      15 Jul 2021 03:40  Phos  3.5     07-15  Mg     2.0     07-15    TPro  5.7<L>  /  Alb  4.0  /  TBili  0.6  /  DBili  x   /  AST  36  /  ALT  8<L>  /  AlkPhos  30<L>  07-15    PT/INR - ( 15 Jul 2021 03:40 )   PT: 15.1 sec;   INR: 1.27     PTT - ( 15 Jul 2021 03:40 )  PTT:34.6 sec    ABG - ( 15 Jul 2021 04:09 ) 7.45/33/128/98    RADIOLOGY & ADDITIONAL STUDIES: reviewed     Patient with chronic atrial fib and known valvular disease now POD#2 - doing well    1. CV  hemodynamically stable  on amiodarone infusion - transition to po amio; monitor rhythm  telemetry - rate controlled Fib alternating with intermittent sinus  if remains in Fib later today - plan start low dose heparin infusion for systemic AC  cont Milrinone 0.25 for now - assess need and ability to titrate over time  ASA/statin  LA 1.3    2. Pulm   titrate supplemental oxygen down to off as clinical scenario allows  ambulation w/staff assist  incentive spirometry  monitor chest tube output  atelectasis RLL noted     3. Endocrine  Hx DM - poor glycemic control  insulin regimen adjusted this am to obtain/maintain glycemic control (lantus/premeal/ISS)  HgA1c 7.0    pain management  bowel regimen     DVT and GI prophylaxis    d/w patient/staff and CTS    I have spent/provided stated minutes of critical care time to this patient: 90

## 2021-07-16 LAB
ALBUMIN SERPL ELPH-MCNC: 3.8 G/DL — SIGNIFICANT CHANGE UP (ref 3.3–5)
ALP SERPL-CCNC: 34 U/L — LOW (ref 40–120)
ALT FLD-CCNC: <5 U/L — LOW (ref 10–45)
ANION GAP SERPL CALC-SCNC: 10 MMOL/L — SIGNIFICANT CHANGE UP (ref 5–17)
ANION GAP SERPL CALC-SCNC: 12 MMOL/L — SIGNIFICANT CHANGE UP (ref 5–17)
ANION GAP SERPL CALC-SCNC: 13 MMOL/L — SIGNIFICANT CHANGE UP (ref 5–17)
APTT BLD: 44.1 SEC — HIGH (ref 27.5–35.5)
APTT BLD: 63.4 SEC — HIGH (ref 27.5–35.5)
APTT BLD: 94.7 SEC — HIGH (ref 27.5–35.5)
AST SERPL-CCNC: 25 U/L — SIGNIFICANT CHANGE UP (ref 10–40)
BILIRUB SERPL-MCNC: 0.5 MG/DL — SIGNIFICANT CHANGE UP (ref 0.2–1.2)
BUN SERPL-MCNC: 30 MG/DL — HIGH (ref 7–23)
BUN SERPL-MCNC: 32 MG/DL — HIGH (ref 7–23)
BUN SERPL-MCNC: 33 MG/DL — HIGH (ref 7–23)
CALCIUM SERPL-MCNC: 7.8 MG/DL — LOW (ref 8.4–10.5)
CALCIUM SERPL-MCNC: 8 MG/DL — LOW (ref 8.4–10.5)
CALCIUM SERPL-MCNC: 8 MG/DL — LOW (ref 8.4–10.5)
CHLORIDE SERPL-SCNC: 96 MMOL/L — SIGNIFICANT CHANGE UP (ref 96–108)
CHLORIDE SERPL-SCNC: 99 MMOL/L — SIGNIFICANT CHANGE UP (ref 96–108)
CHLORIDE SERPL-SCNC: 99 MMOL/L — SIGNIFICANT CHANGE UP (ref 96–108)
CO2 SERPL-SCNC: 23 MMOL/L — SIGNIFICANT CHANGE UP (ref 22–31)
CO2 SERPL-SCNC: 24 MMOL/L — SIGNIFICANT CHANGE UP (ref 22–31)
CO2 SERPL-SCNC: 24 MMOL/L — SIGNIFICANT CHANGE UP (ref 22–31)
CREAT SERPL-MCNC: 1.67 MG/DL — HIGH (ref 0.5–1.3)
CREAT SERPL-MCNC: 1.74 MG/DL — HIGH (ref 0.5–1.3)
CREAT SERPL-MCNC: 1.85 MG/DL — HIGH (ref 0.5–1.3)
GAS PNL BLDA: SIGNIFICANT CHANGE UP
GLUCOSE BLDC GLUCOMTR-MCNC: 145 MG/DL — HIGH (ref 70–99)
GLUCOSE BLDC GLUCOMTR-MCNC: 153 MG/DL — HIGH (ref 70–99)
GLUCOSE BLDC GLUCOMTR-MCNC: 175 MG/DL — HIGH (ref 70–99)
GLUCOSE BLDC GLUCOMTR-MCNC: 204 MG/DL — HIGH (ref 70–99)
GLUCOSE SERPL-MCNC: 162 MG/DL — HIGH (ref 70–99)
GLUCOSE SERPL-MCNC: 170 MG/DL — HIGH (ref 70–99)
GLUCOSE SERPL-MCNC: 171 MG/DL — HIGH (ref 70–99)
HCT VFR BLD CALC: 22.5 % — LOW (ref 39–50)
HCT VFR BLD CALC: 22.9 % — LOW (ref 39–50)
HCT VFR BLD CALC: 23.5 % — LOW (ref 39–50)
HGB BLD-MCNC: 8 G/DL — LOW (ref 13–17)
HGB BLD-MCNC: 8 G/DL — LOW (ref 13–17)
HGB BLD-MCNC: 8.3 G/DL — LOW (ref 13–17)
INR BLD: 1.13 — SIGNIFICANT CHANGE UP (ref 0.88–1.16)
INR BLD: 1.22 — HIGH (ref 0.88–1.16)
MAGNESIUM SERPL-MCNC: 2.3 MG/DL — SIGNIFICANT CHANGE UP (ref 1.6–2.6)
MAGNESIUM SERPL-MCNC: 2.3 MG/DL — SIGNIFICANT CHANGE UP (ref 1.6–2.6)
MAGNESIUM SERPL-MCNC: 2.4 MG/DL — SIGNIFICANT CHANGE UP (ref 1.6–2.6)
MAGNESIUM SERPL-MCNC: 2.4 MG/DL — SIGNIFICANT CHANGE UP (ref 1.6–2.6)
MCHC RBC-ENTMCNC: 30.2 PG — SIGNIFICANT CHANGE UP (ref 27–34)
MCHC RBC-ENTMCNC: 30.4 PG — SIGNIFICANT CHANGE UP (ref 27–34)
MCHC RBC-ENTMCNC: 30.8 PG — SIGNIFICANT CHANGE UP (ref 27–34)
MCHC RBC-ENTMCNC: 34.9 GM/DL — SIGNIFICANT CHANGE UP (ref 32–36)
MCHC RBC-ENTMCNC: 35.3 GM/DL — SIGNIFICANT CHANGE UP (ref 32–36)
MCHC RBC-ENTMCNC: 35.6 GM/DL — SIGNIFICANT CHANGE UP (ref 32–36)
MCV RBC AUTO: 86.1 FL — SIGNIFICANT CHANGE UP (ref 80–100)
MCV RBC AUTO: 86.4 FL — SIGNIFICANT CHANGE UP (ref 80–100)
MCV RBC AUTO: 86.5 FL — SIGNIFICANT CHANGE UP (ref 80–100)
NRBC # BLD: 0 /100 WBCS — SIGNIFICANT CHANGE UP (ref 0–0)
PHOSPHATE SERPL-MCNC: 3.2 MG/DL — SIGNIFICANT CHANGE UP (ref 2.5–4.5)
PHOSPHATE SERPL-MCNC: 3.2 MG/DL — SIGNIFICANT CHANGE UP (ref 2.5–4.5)
PHOSPHATE SERPL-MCNC: 4.5 MG/DL — SIGNIFICANT CHANGE UP (ref 2.5–4.5)
PLATELET # BLD AUTO: 68 K/UL — LOW (ref 150–400)
PLATELET # BLD AUTO: 72 K/UL — LOW (ref 150–400)
PLATELET # BLD AUTO: 89 K/UL — LOW (ref 150–400)
POTASSIUM SERPL-MCNC: 3.7 MMOL/L — SIGNIFICANT CHANGE UP (ref 3.5–5.3)
POTASSIUM SERPL-MCNC: 3.7 MMOL/L — SIGNIFICANT CHANGE UP (ref 3.5–5.3)
POTASSIUM SERPL-MCNC: 4 MMOL/L — SIGNIFICANT CHANGE UP (ref 3.5–5.3)
POTASSIUM SERPL-SCNC: 3.7 MMOL/L — SIGNIFICANT CHANGE UP (ref 3.5–5.3)
POTASSIUM SERPL-SCNC: 3.7 MMOL/L — SIGNIFICANT CHANGE UP (ref 3.5–5.3)
POTASSIUM SERPL-SCNC: 4 MMOL/L — SIGNIFICANT CHANGE UP (ref 3.5–5.3)
PROT SERPL-MCNC: 5.6 G/DL — LOW (ref 6–8.3)
PROTHROM AB SERPL-ACNC: 13.5 SEC — SIGNIFICANT CHANGE UP (ref 10.6–13.6)
PROTHROM AB SERPL-ACNC: 14.5 SEC — HIGH (ref 10.6–13.6)
RBC # BLD: 2.6 M/UL — LOW (ref 4.2–5.8)
RBC # BLD: 2.65 M/UL — LOW (ref 4.2–5.8)
RBC # BLD: 2.73 M/UL — LOW (ref 4.2–5.8)
RBC # FLD: 12.9 % — SIGNIFICANT CHANGE UP (ref 10.3–14.5)
RBC # FLD: 13 % — SIGNIFICANT CHANGE UP (ref 10.3–14.5)
RBC # FLD: 13 % — SIGNIFICANT CHANGE UP (ref 10.3–14.5)
SODIUM SERPL-SCNC: 130 MMOL/L — LOW (ref 135–145)
SODIUM SERPL-SCNC: 135 MMOL/L — SIGNIFICANT CHANGE UP (ref 135–145)
SODIUM SERPL-SCNC: 135 MMOL/L — SIGNIFICANT CHANGE UP (ref 135–145)
WBC # BLD: 10.18 K/UL — SIGNIFICANT CHANGE UP (ref 3.8–10.5)
WBC # BLD: 8.92 K/UL — SIGNIFICANT CHANGE UP (ref 3.8–10.5)
WBC # BLD: 9.02 K/UL — SIGNIFICANT CHANGE UP (ref 3.8–10.5)
WBC # FLD AUTO: 10.18 K/UL — SIGNIFICANT CHANGE UP (ref 3.8–10.5)
WBC # FLD AUTO: 8.92 K/UL — SIGNIFICANT CHANGE UP (ref 3.8–10.5)
WBC # FLD AUTO: 9.02 K/UL — SIGNIFICANT CHANGE UP (ref 3.8–10.5)

## 2021-07-16 PROCEDURE — 99291 CRITICAL CARE FIRST HOUR: CPT

## 2021-07-16 PROCEDURE — 99223 1ST HOSP IP/OBS HIGH 75: CPT

## 2021-07-16 PROCEDURE — 71045 X-RAY EXAM CHEST 1 VIEW: CPT | Mod: 26,76

## 2021-07-16 RX ORDER — HEPARIN SODIUM 5000 [USP'U]/ML
600 INJECTION INTRAVENOUS; SUBCUTANEOUS
Qty: 25000 | Refills: 0 | Status: DISCONTINUED | OUTPATIENT
Start: 2021-07-16 | End: 2021-07-16

## 2021-07-16 RX ORDER — RIVAROXABAN 15 MG-20MG
15 KIT ORAL
Refills: 0 | Status: DISCONTINUED | OUTPATIENT
Start: 2021-07-16 | End: 2021-07-17

## 2021-07-16 RX ORDER — FUROSEMIDE 40 MG
20 TABLET ORAL ONCE
Refills: 0 | Status: COMPLETED | OUTPATIENT
Start: 2021-07-16 | End: 2021-07-16

## 2021-07-16 RX ORDER — ALBUMIN HUMAN 25 %
250 VIAL (ML) INTRAVENOUS ONCE
Refills: 0 | Status: COMPLETED | OUTPATIENT
Start: 2021-07-16 | End: 2021-07-18

## 2021-07-16 RX ORDER — ALBUMIN HUMAN 25 %
250 VIAL (ML) INTRAVENOUS ONCE
Refills: 0 | Status: COMPLETED | OUTPATIENT
Start: 2021-07-16 | End: 2021-07-16

## 2021-07-16 RX ORDER — PHENYLEPHRINE HYDROCHLORIDE 10 MG/ML
0.5 INJECTION INTRAVENOUS
Qty: 40 | Refills: 0 | Status: DISCONTINUED | OUTPATIENT
Start: 2021-07-16 | End: 2021-07-17

## 2021-07-16 RX ORDER — ALBUMIN HUMAN 25 %
250 VIAL (ML) INTRAVENOUS
Refills: 0 | Status: COMPLETED | OUTPATIENT
Start: 2021-07-16 | End: 2021-07-16

## 2021-07-16 RX ORDER — OXYCODONE HYDROCHLORIDE 5 MG/1
5 TABLET ORAL ONCE
Refills: 0 | Status: DISCONTINUED | OUTPATIENT
Start: 2021-07-16 | End: 2021-07-16

## 2021-07-16 RX ADMIN — OXYCODONE HYDROCHLORIDE 5 MILLIGRAM(S): 5 TABLET ORAL at 21:30

## 2021-07-16 RX ADMIN — Medication 4 UNIT(S): at 12:39

## 2021-07-16 RX ADMIN — GABAPENTIN 300 MILLIGRAM(S): 400 CAPSULE ORAL at 17:52

## 2021-07-16 RX ADMIN — Medication 20 MILLIGRAM(S): at 22:29

## 2021-07-16 RX ADMIN — Medication 2: at 16:08

## 2021-07-16 RX ADMIN — OXYCODONE HYDROCHLORIDE 5 MILLIGRAM(S): 5 TABLET ORAL at 19:57

## 2021-07-16 RX ADMIN — Medication 125 MILLILITER(S): at 12:39

## 2021-07-16 RX ADMIN — PANTOPRAZOLE SODIUM 40 MILLIGRAM(S): 20 TABLET, DELAYED RELEASE ORAL at 06:34

## 2021-07-16 RX ADMIN — SENNA PLUS 2 TABLET(S): 8.6 TABLET ORAL at 21:52

## 2021-07-16 RX ADMIN — POLYETHYLENE GLYCOL 3350 17 GRAM(S): 17 POWDER, FOR SOLUTION ORAL at 12:08

## 2021-07-16 RX ADMIN — Medication 4 UNIT(S): at 08:04

## 2021-07-16 RX ADMIN — AMIODARONE HYDROCHLORIDE 400 MILLIGRAM(S): 400 TABLET ORAL at 06:34

## 2021-07-16 RX ADMIN — Medication 20 MILLIGRAM(S): at 07:20

## 2021-07-16 RX ADMIN — ATORVASTATIN CALCIUM 40 MILLIGRAM(S): 80 TABLET, FILM COATED ORAL at 21:52

## 2021-07-16 RX ADMIN — AMIODARONE HYDROCHLORIDE 400 MILLIGRAM(S): 400 TABLET ORAL at 21:52

## 2021-07-16 RX ADMIN — Medication 125 MILLILITER(S): at 16:00

## 2021-07-16 RX ADMIN — INSULIN GLARGINE 14 UNIT(S): 100 INJECTION, SOLUTION SUBCUTANEOUS at 21:52

## 2021-07-16 RX ADMIN — PHENYLEPHRINE HYDROCHLORIDE 15.5 MICROGRAM(S)/KG/MIN: 10 INJECTION INTRAVENOUS at 23:23

## 2021-07-16 RX ADMIN — Medication 2: at 21:52

## 2021-07-16 RX ADMIN — OXYCODONE HYDROCHLORIDE 5 MILLIGRAM(S): 5 TABLET ORAL at 20:27

## 2021-07-16 RX ADMIN — AMIODARONE HYDROCHLORIDE 400 MILLIGRAM(S): 400 TABLET ORAL at 12:08

## 2021-07-16 RX ADMIN — Medication 81 MILLIGRAM(S): at 12:07

## 2021-07-16 RX ADMIN — RIVAROXABAN 15 MILLIGRAM(S): KIT at 19:43

## 2021-07-16 RX ADMIN — GABAPENTIN 300 MILLIGRAM(S): 400 CAPSULE ORAL at 06:34

## 2021-07-16 RX ADMIN — Medication 4 UNIT(S): at 16:07

## 2021-07-16 RX ADMIN — Medication 125 MILLILITER(S): at 00:07

## 2021-07-16 RX ADMIN — CHLORHEXIDINE GLUCONATE 1 APPLICATION(S): 213 SOLUTION TOPICAL at 06:35

## 2021-07-16 RX ADMIN — Medication 4: at 07:21

## 2021-07-16 NOTE — DIETITIAN INITIAL EVALUATION ADULT. - OTHER INFO
76 yo/male with PMHx HTN, HLD, Afib, DM, CKD, bacterial endocarditis, now s/p AVR/MR repair/ cryoMAZE, ANGELO occlusion on 7/13. C/b post-op Afib. Pt seen in room, awake, alert, very pleasant, OOB in chair. Wife at bedside. Pt endorsed eating very well at home PTA, no specific dietary restrictions followed but tries to generally eat a "healthy" diet. Confirmed NKFA. Today he reported that his appetite remains minimal but he had a few bites of fruit and cottage cheese for lunch today. No N/V/C/D reported at this time. +Small BM 7/16. He denied difficulty chewing or swallowing. Skin noted with MSI, charles x 2. Generalized trace edema. He denied pain today but had received oxycodone the other day and said that it knocked him out. Afebrile. DM/Heart healthy nutrition reviewed and handouts provided. Encouraged lean protein intake. Will continue to follow per RD protocol.

## 2021-07-16 NOTE — DIETITIAN INITIAL EVALUATION ADULT. - OTHER CALCULATIONS
Ideal body weight used for calculations as pt >120% of IBW (124% IBW). Needs estimated for age and adjusted for post-op/wound healing

## 2021-07-16 NOTE — PROGRESS NOTE ADULT - SUBJECTIVE AND OBJECTIVE BOX
CTICU  CRITICAL  CARE  attending     Hand off received 					   Pertinent clinical, laboratory, radiographic, hemodynamic, echocardiographic, respiratory data, microbiologic data and chart were reviewed and analyzed frequently throughout the course of the day and night  Patient seen and examined with CTS/ SH attending at bedside    Pt is a 75y , Male, post op day # 3 s/p AVR; MV repair; cryomaze ablation; ANGELO occlusion;    post op:    worsening renal fn; Cr up to 1.7 (baseline 1.3)  acute post H'gic anemia  bilat pleural effusions  atrial fibrillation    HPI:    74 yo F with PMHx of HTN, HLD, Afib (on Xarelto, last dose 7/10/21), DM, CKD III, bacterial endocarditis 2015 treated with IV Abx, moderate MR and severe AI who presented to Dr. Chapman for evaluation of valvular disease. Pt reports he is very active, he is an avid bike rider without any symptoms. Pt denies fever, chills, cough, CP, palpitations, SOB, PND/orthopnea, abdominal pain, N/V/D, dizziness, syncope. ECHO 6/1/21: normal EF, moderate to severe LV dilatation, severe AI cannot exclude bicuspid AV, moderate MR. In light of pt's risk factors and known valvular disease pt is referred to [facility] for diagnostic cardiac cath prior to bioprosthetic AVR.         , FAMILY HISTORY:  No pertinent family history in first degree relatives    PAST MEDICAL & SURGICAL HISTORY:  Hypertension    Hyperlipidemia    Afib    Diabetes mellitus    Stage 3 chronic kidney disease    H/O bacterial endocarditis    Severe aortic insufficiency    Moderate mitral regurgitation    S/P rotator cuff repair    S/P tonsillectomy      Patient is a 75y old  Male who presents with severe AI/moderate mitral regurgitation (16 Jul 2021 14:05)      14 system review limited by post op morbidity    Vital signs, hemodynamic and respiratory parameters were reviewed from the bedside nursing flowsheet.  ICU Vital Signs Last 24 Hrs  T(C): 36.4 (17 Jul 2021 01:21), Max: 36.9 (16 Jul 2021 14:00)  T(F): 97.6 (17 Jul 2021 01:21), Max: 98.4 (16 Jul 2021 14:00)  HR: 68 (17 Jul 2021 02:00) (64 - 136)  BP: 101/57 (16 Jul 2021 12:00) (101/57 - 101/57)  BP(mean): 69 (16 Jul 2021 12:00) (69 - 69)  ABP: 143/60 (17 Jul 2021 02:00) (93/39 - 158/69)  ABP(mean): 81 (17 Jul 2021 02:00) (55 - 92)  RR: 18 (17 Jul 2021 02:00) (16 - 20)  SpO2: 95% (17 Jul 2021 02:00) (90% - 100%)    Adult Advanced Hemodynamics Last 24 Hrs  CVP(mm Hg): 9 (16 Jul 2021 16:00) (4 - 30)  CVP(cm H2O): --  CO: --  CI: --  PA: --  PA(mean): --  PCWP: --  SVR: --  SVRI: --  PVR: --  PVRI: --, ABG - ( 17 Jul 2021 02:37 )  pH, Arterial: 7.39  pH, Blood: x     /  pCO2: 44    /  pO2: 78    / HCO3: 27    / Base Excess: 1.2   /  SaO2: 100.0               Intake and output was reviewed and the fluid balance was calculated  Daily     Daily   I&O's Summary    15 Jul 2021 07:01  -  16 Jul 2021 07:00  --------------------------------------------------------  IN: 1097 mL / OUT: 1360 mL / NET: -263 mL    16 Jul 2021 07:01  -  17 Jul 2021 03:27  --------------------------------------------------------  IN: 1353.8 mL / OUT: 1335 mL / NET: 18.8 mL        All lines and drain sites were assessed  Glycemic trend was reviewedCAPILLARY BLOOD GLUCOSE      POCT Blood Glucose.: 175 mg/dL (16 Jul 2021 21:44)    No acute change in mental status  Auscultation of the chest reveals equal bs  Abdomen is soft  Extremities are warm and well perfused  Wounds appear clean and unremarkable  Antibiotics are periop    labs  CBC Full  -  ( 17 Jul 2021 03:01 )  WBC Count : 9.66 K/uL  RBC Count : 2.47 M/uL  Hemoglobin : 7.4 g/dL  Hematocrit : 21.6 %  Platelet Count - Automated : 107 K/uL  Mean Cell Volume : 87.4 fl  Mean Cell Hemoglobin : 30.0 pg  Mean Cell Hemoglobin Concentration : 34.3 gm/dL  Auto Neutrophil # : x  Auto Lymphocyte # : x  Auto Monocyte # : x  Auto Eosinophil # : x  Auto Basophil # : x  Auto Neutrophil % : x  Auto Lymphocyte % : x  Auto Monocyte % : x  Auto Eosinophil % : x  Auto Basophil % : x    07-17    x   |  x   |  x   ----------------------------<  x   x    |  25  |  x     Ca    8.0<L>      16 Jul 2021 10:32  Phos  4.5     07-16  Mg     2.4     07-17    TPro  5.6<L>  /  Alb  3.8  /  TBili  0.5  /  DBili  x   /  AST  25  /  ALT  <5<L>  /  AlkPhos  34<L>  07-16    PT/INR - ( 17 Jul 2021 02:43 )   PT: 17.9 sec;   INR: 1.52          PTT - ( 17 Jul 2021 02:43 )  PTT:29.8 sec  The current medications were reviewed   MEDICATIONS  (STANDING):  albumin human  5% IVPB 250 milliLiter(s) IV Intermittent every 30 minutes  albumin human  5% IVPB 250 milliLiter(s) IV Intermittent every 30 minutes  albumin human  5% IVPB 250 milliLiter(s) IV Intermittent once  aMIOdarone    Tablet 400 milliGRAM(s) Oral every 8 hours  aMIOdarone    Tablet   Oral   aspirin enteric coated 81 milliGRAM(s) Oral daily  atorvastatin 40 milliGRAM(s) Oral at bedtime  buMETAnide Injectable 2 milliGRAM(s) IV Push once  chlorhexidine 2% Cloths 1 Application(s) Topical <User Schedule>  dextrose 40% Gel 15 Gram(s) Oral once  dextrose 5%. 1000 milliLiter(s) (50 mL/Hr) IV Continuous <Continuous>  dextrose 5%. 1000 milliLiter(s) (100 mL/Hr) IV Continuous <Continuous>  dextrose 50% Injectable 25 Gram(s) IV Push once  dextrose 50% Injectable 12.5 Gram(s) IV Push once  dextrose 50% Injectable 25 Gram(s) IV Push once  diltiazem Injectable 10 milliGRAM(s) IV Push once  gabapentin 300 milliGRAM(s) Oral every 12 hours  glucagon  Injectable 1 milliGRAM(s) IntraMuscular once  insulin glargine Injectable (LANTUS) 14 Unit(s) SubCutaneous at bedtime  insulin lispro (ADMELOG) corrective regimen sliding scale   SubCutaneous Before meals and at bedtime  insulin lispro Injectable (ADMELOG) 4 Unit(s) SubCutaneous three times a day before meals  pantoprazole    Tablet 40 milliGRAM(s) Oral before breakfast  phenylephrine    Infusion 0.5 MICROgram(s)/kG/Min (15.5 mL/Hr) IV Continuous <Continuous>  polyethylene glycol 3350 17 Gram(s) Oral daily  rivaroxaban 15 milliGRAM(s) Oral with dinner  senna 2 Tablet(s) Oral at bedtime  sodium chloride 0.9%. 1000 milliLiter(s) (10 mL/Hr) IV Continuous <Continuous>    MEDICATIONS  (PRN):  acetaminophen   Tablet .. 650 milliGRAM(s) Oral every 6 hours PRN Mild Pain (1 - 3)  oxyCODONE    IR 5 milliGRAM(s) Oral every 4 hours PRN Moderate Pain (4 - 6)  oxyCODONE    IR 10 milliGRAM(s) Oral every 6 hours PRN Severe Pain (7 - 10)       PROBLEM LIST/ ASSESSMENT:  HEALTH ISSUES - PROBLEM Dx:      ,   Patient is a 75y old  Male who presents with severe AI/moderate mitral regurgitation  (16 Jul 2021 14:05)     s/p AVR; MV repair; cryomaze ablation; ANGELO occlusion;      My plan includes :    Maintain MAP > 80 for renal recovery ; prevent further worsening  Transfuse for low Hct  Amiodarone for atrial fibrillation; start AC  encourage IS; chest PT  may benefit from NIPPV for basal effusions and splinting related hypoventilation    close hemodynamic, ventilatory and drain monitoring and management per post op routine    Monitor for arrhythmias and monitor parameters for organ perfusion  monitor neurologic status  Head of the bed should remain elevated to 45 deg .   chest PT and IS will be encouraged  monitor adequacy of oxygenation and ventilation and attempt to wean oxygen  Nutritional goals will be met using po eventually , ensure adequate caloric intake and montior the same  Stress ulcer and VTE prophylaxis will be achieved    Glycemic control is satisfactory  Electrolytes have been repleted as necessary and wound care has been carried out. Pain control has been achieved.   agressive physical therapy and early mobility and ambulation goals will be met   The family was updated about the course and plan  CRITICAL CARE TIME SPENT in evaluation and management, reassessments, review and interpretation of labs and x-rays, ventilator and hemodynamic management, formulating a plan and coordinating care: __30____ MIN.  Time does not include procedural time.  CTICU ATTENDING     					    Robert Perry MD                        	 CTICU  CRITICAL  CARE  attending     Hand off received 					   Pertinent clinical, laboratory, radiographic, hemodynamic, echocardiographic, respiratory data, microbiologic data and chart were reviewed and analyzed frequently throughout the course of the day and night  Patient seen and examined with CTS/ SH attending at bedside    Pt is a 75y , Male, post op day # 3 s/p AVR; MV repair; cryomaze ablation; ANGELO occlusion;    post op:    worsening renal fn; Cr up to 1.7 (baseline 1.3)  acute post H'gic anemia  bilat pleural effusions  atrial fibrillation    HPI:    76 yo F with PMHx of HTN, HLD, Afib (on Xarelto, last dose 7/10/21), DM, CKD III, bacterial endocarditis 2015 treated with IV Abx, moderate MR and severe AI who presented to Dr. Chapman for evaluation of valvular disease. Pt reports he is very active, he is an avid bike rider without any symptoms. Pt denies fever, chills, cough, CP, palpitations, SOB, PND/orthopnea, abdominal pain, N/V/D, dizziness, syncope. ECHO 6/1/21: normal EF, moderate to severe LV dilatation, severe AI cannot exclude bicuspid AV, moderate MR. In light of pt's risk factors and known valvular disease pt is referred to [facility] for diagnostic cardiac cath prior to bioprosthetic AVR.         , FAMILY HISTORY:  No pertinent family history in first degree relatives    PAST MEDICAL & SURGICAL HISTORY:  Hypertension    Hyperlipidemia    Afib    Diabetes mellitus    Stage 3 chronic kidney disease    H/O bacterial endocarditis    Severe aortic insufficiency    Moderate mitral regurgitation    S/P rotator cuff repair    S/P tonsillectomy      Patient is a 75y old  Male who presents with severe AI/moderate mitral regurgitation (16 Jul 2021 14:05)      14 system review limited by post op morbidity    Vital signs, hemodynamic and respiratory parameters were reviewed from the bedside nursing flowsheet.  ICU Vital Signs Last 24 Hrs  T(C): 36.4 (17 Jul 2021 01:21), Max: 36.9 (16 Jul 2021 14:00)  T(F): 97.6 (17 Jul 2021 01:21), Max: 98.4 (16 Jul 2021 14:00)  HR: 68 (17 Jul 2021 02:00) (64 - 136)  BP: 101/57 (16 Jul 2021 12:00) (101/57 - 101/57)  BP(mean): 69 (16 Jul 2021 12:00) (69 - 69)  ABP: 143/60 (17 Jul 2021 02:00) (93/39 - 158/69)  ABP(mean): 81 (17 Jul 2021 02:00) (55 - 92)  RR: 18 (17 Jul 2021 02:00) (16 - 20)  SpO2: 95% (17 Jul 2021 02:00) (90% - 100%)    Adult Advanced Hemodynamics Last 24 Hrs  CVP(mm Hg): 9 (16 Jul 2021 16:00) (4 - 30)  CVP(cm H2O): --  CO: --  CI: --  PA: --  PA(mean): --  PCWP: --  SVR: --  SVRI: --  PVR: --  PVRI: --, ABG - ( 17 Jul 2021 02:37 )  pH, Arterial: 7.39  pH, Blood: x     /  pCO2: 44    /  pO2: 78    / HCO3: 27    / Base Excess: 1.2   /  SaO2: 100.0               Intake and output was reviewed and the fluid balance was calculated  Daily     Daily   I&O's Summary    15 Jul 2021 07:01  -  16 Jul 2021 07:00  --------------------------------------------------------  IN: 1097 mL / OUT: 1360 mL / NET: -263 mL    16 Jul 2021 07:01  -  17 Jul 2021 03:27  --------------------------------------------------------  IN: 1353.8 mL / OUT: 1335 mL / NET: 18.8 mL        All lines and drain sites were assessed  Glycemic trend was reviewedCAPILLARY BLOOD GLUCOSE      POCT Blood Glucose.: 175 mg/dL (16 Jul 2021 21:44)    No acute change in mental status  Auscultation of the chest reveals equal bs  Abdomen is soft  Extremities are warm and well perfused  Wounds appear clean and unremarkable  Antibiotics are periop    labs  CBC Full  -  ( 17 Jul 2021 03:01 )  WBC Count : 9.66 K/uL  RBC Count : 2.47 M/uL  Hemoglobin : 7.4 g/dL  Hematocrit : 21.6 %  Platelet Count - Automated : 107 K/uL  Mean Cell Volume : 87.4 fl  Mean Cell Hemoglobin : 30.0 pg  Mean Cell Hemoglobin Concentration : 34.3 gm/dL  Auto Neutrophil # : x  Auto Lymphocyte # : x  Auto Monocyte # : x  Auto Eosinophil # : x  Auto Basophil # : x  Auto Neutrophil % : x  Auto Lymphocyte % : x  Auto Monocyte % : x  Auto Eosinophil % : x  Auto Basophil % : x    07-17    x   |  x   |  x   ----------------------------<  x   x    |  25  |  x     Ca    8.0<L>      16 Jul 2021 10:32  Phos  4.5     07-16  Mg     2.4     07-17    TPro  5.6<L>  /  Alb  3.8  /  TBili  0.5  /  DBili  x   /  AST  25  /  ALT  <5<L>  /  AlkPhos  34<L>  07-16    PT/INR - ( 17 Jul 2021 02:43 )   PT: 17.9 sec;   INR: 1.52          PTT - ( 17 Jul 2021 02:43 )  PTT:29.8 sec  The current medications were reviewed   MEDICATIONS  (STANDING):  albumin human  5% IVPB 250 milliLiter(s) IV Intermittent every 30 minutes  albumin human  5% IVPB 250 milliLiter(s) IV Intermittent every 30 minutes  albumin human  5% IVPB 250 milliLiter(s) IV Intermittent once  aMIOdarone    Tablet 400 milliGRAM(s) Oral every 8 hours  aMIOdarone    Tablet   Oral   aspirin enteric coated 81 milliGRAM(s) Oral daily  atorvastatin 40 milliGRAM(s) Oral at bedtime  buMETAnide Injectable 2 milliGRAM(s) IV Push once  chlorhexidine 2% Cloths 1 Application(s) Topical <User Schedule>  dextrose 40% Gel 15 Gram(s) Oral once  dextrose 5%. 1000 milliLiter(s) (50 mL/Hr) IV Continuous <Continuous>  dextrose 5%. 1000 milliLiter(s) (100 mL/Hr) IV Continuous <Continuous>  dextrose 50% Injectable 25 Gram(s) IV Push once  dextrose 50% Injectable 12.5 Gram(s) IV Push once  dextrose 50% Injectable 25 Gram(s) IV Push once  diltiazem Injectable 10 milliGRAM(s) IV Push once  gabapentin 300 milliGRAM(s) Oral every 12 hours  glucagon  Injectable 1 milliGRAM(s) IntraMuscular once  insulin glargine Injectable (LANTUS) 14 Unit(s) SubCutaneous at bedtime  insulin lispro (ADMELOG) corrective regimen sliding scale   SubCutaneous Before meals and at bedtime  insulin lispro Injectable (ADMELOG) 4 Unit(s) SubCutaneous three times a day before meals  pantoprazole    Tablet 40 milliGRAM(s) Oral before breakfast  phenylephrine    Infusion 0.5 MICROgram(s)/kG/Min (15.5 mL/Hr) IV Continuous <Continuous>  polyethylene glycol 3350 17 Gram(s) Oral daily  rivaroxaban 15 milliGRAM(s) Oral with dinner  senna 2 Tablet(s) Oral at bedtime  sodium chloride 0.9%. 1000 milliLiter(s) (10 mL/Hr) IV Continuous <Continuous>    MEDICATIONS  (PRN):  acetaminophen   Tablet .. 650 milliGRAM(s) Oral every 6 hours PRN Mild Pain (1 - 3)  oxyCODONE    IR 5 milliGRAM(s) Oral every 4 hours PRN Moderate Pain (4 - 6)  oxyCODONE    IR 10 milliGRAM(s) Oral every 6 hours PRN Severe Pain (7 - 10)       PROBLEM LIST/ ASSESSMENT:  HEALTH ISSUES - PROBLEM Dx:      ,   Patient is a 75y old  Male who presents with severe AI/moderate mitral regurgitation  (16 Jul 2021 14:05)     s/p AVR; MV repair; cryomaze ablation; ANGELO occlusion;      My plan includes :    Maintain MAP > 80 for renal recovery ; prevent further worsening  Transfuse for low Hct  Amiodarone for atrial fibrillation; start AC  encourage IS; chest PT  may benefit from NIPPV for basal effusions and splinting related hypoventilation    close hemodynamic, ventilatory and drain monitoring and management per post op routine    Monitor for arrhythmias and monitor parameters for organ perfusion  monitor neurologic status  Head of the bed should remain elevated to 45 deg .   chest PT and IS will be encouraged  monitor adequacy of oxygenation and ventilation and attempt to wean oxygen  Nutritional goals will be met using po eventually , ensure adequate caloric intake and montior the same  Stress ulcer and VTE prophylaxis will be achieved    Glycemic control is satisfactory  Electrolytes have been repleted as necessary and wound care has been carried out. Pain control has been achieved.   agressive physical therapy and early mobility and ambulation goals will be met   The family was updated about the course and plan  CRITICAL CARE TIME SPENT in evaluation and management, reassessments, review and interpretation of labs and x-rays, ventilator and hemodynamic management, formulating a plan and coordinating care: __50___ MIN.  Time does not include procedural time.  CTICU ATTENDING     					    Robert Perry MD

## 2021-07-16 NOTE — DIETITIAN INITIAL EVALUATION ADULT. - ADD RECOMMEND
1. Encourage PO intake 2. Monitor lytes and replete prn. POC BG q6hrs 3. Pain and bowel regimens per team 4. Reinforce diet ed

## 2021-07-16 NOTE — DIETITIAN INITIAL EVALUATION ADULT. - PERSON TAUGHT/METHOD
DM/DASH, fair understanding, requires reinforcement/verbal instruction/written material/patient instructed/spouse instructed

## 2021-07-16 NOTE — CONSULT NOTE ADULT - SUBJECTIVE AND OBJECTIVE BOX
Electrophysiology Consult Note:     CHIEF COMPLAINT:  Patient is a 75y old  Male who presents with a chief complaint of Cardiac cath (15 Jul 2021 22:53)        HISTORY OF PRESENT ILLNESS:   COVID vaccination- Pfizer 2nd dose 2/2/21  [facility]: [facility] [place]    Outpatient Cardiologist -- Dr. Morales     74 yo M with PMHx of HTN, HLD, paroxysmal Afib (on Xarelto 15 mg daily and Sotalol 80 mg bid), DM, CKD III, bacterial endocarditis 2015 treated with IV Abx, moderate MR and severe AI, normal EF on echo, 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   Pt presented in NSR.  Postop on 7/14, pt went back to AFIB.  Started on AMIO bolus/gtt and now PO load 400 mg TID.   Telemetry noted AFIB with occasional aflutter (likely from organizing afib due to being on amio).  VR 90-100s when in AFIB and up to 120-130s when in organized afib (ie aflutter).  He does not feel the irregular pulse / rapid beats while here.   Pt states that he has pafib for few years, "asymptomatic" from it per pt.  He is very active prior to surgery.  No h/o syncope. He was on Sotalol 80 mg bid up till this admission.     Currently on low dose Heparin gtt.      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  Enciso with hematuria.           PAST MEDICAL & SURGICAL HISTORY:  Hypertension  Hyperlipidemia  Afib  Diabetes mellitus  Stage 3 chronic kidney disease  H/O bacterial endocarditis  Severe aortic insufficiency  Moderate mitral regurgitation  S/P rotator cuff repair  S/P tonsillectomy      FAMILY HISTORY:  No pertinent family history in first degree relatives      SOCIAL HISTORY:    Pt denies tobacco use, reports weekly EtOH use, denies drug use.    Allergies  No Known Allergies      MEDICATIONS  (STANDING):  albumin human  5% IVPB 250 milliLiter(s) IV Intermittent every 30 minutes  albumin human  5% IVPB 250 milliLiter(s) IV Intermittent every 30 minutes  albumin human  5% IVPB 250 milliLiter(s) IV Intermittent every 30 minutes  aMIOdarone    Tablet 400 milliGRAM(s) Oral every 8 hours  aMIOdarone    Tablet   Oral   aspirin enteric coated 81 milliGRAM(s) Oral daily  atorvastatin 40 milliGRAM(s) Oral at bedtime  gabapentin 300 milliGRAM(s) Oral every 12 hours  heparin  Infusion 800 Unit(s)/Hr (8 mL/Hr) IV Continuous <Continuous>  insulin glargine Injectable (LANTUS) 14 Unit(s) SubCutaneous at bedtime  insulin lispro (ADMELOG) corrective regimen sliding scale   SubCutaneous Before meals and at bedtime  insulin lispro Injectable (ADMELOG) 4 Unit(s) SubCutaneous three times a day before meals  pantoprazole    Tablet 40 milliGRAM(s) Oral before breakfast  polyethylene glycol 3350 17 Gram(s) Oral daily  senna 2 Tablet(s) Oral at bedtime  sodium chloride 0.9%. 1000 milliLiter(s) (10 mL/Hr) IV Continuous <Continuous>    MEDICATIONS  (PRN):  acetaminophen   Tablet .. 650 milliGRAM(s) Oral every 6 hours PRN Mild Pain (1 - 3)  oxyCODONE    IR 5 milliGRAM(s) Oral every 4 hours PRN Moderate Pain (4 - 6)  oxyCODONE    IR 10 milliGRAM(s) Oral every 6 hours PRN Severe Pain (7 - 10)      REVIEW OF SYSTEMS:  CONSTITUTIONAL: No fever, weight loss.  + fatigue  EYES: No eye pain, visual disturbances, or discharge  ENMT:  No difficulty hearing, tinnitus, vertigo; No sinus or throat pain  NECK: No pain or stiffness  RESPIRATORY: No cough, wheezing, chills or hemoptysis; No Shortness of Breath  CARDIOVASCULAR: Tolerating surgical wound pain.  no palpitations/ dizziness/SOb   GASTROINTESTINAL: No abdominal or epigastric pain. No nausea, vomiting, or hematemesis; No diarrhea or constipation. No melena or hematochezia.  GENITOURINARY: No dysuria, frequency, hematuria, or incontinence  NEUROLOGICAL: No headaches, memory loss, loss of strength, numbness, or tremors  SKIN: No itching, burning, rashes, or lesions   LYMPH Nodes: No enlarged glands  ENDOCRINE: No heat or cold intolerance; No hair loss  MUSCULOSKELETAL: No joint pain or swelling; No muscle, back, or extremity pain  PSYCHIATRIC: No depression, anxiety, mood swings, or difficulty sleeping  HEME/LYMPH: No easy bruising, or bleeding gums  ALLERY AND IMMUNOLOGIC: No hives or eczema	      PHYSICAL EXAM:  Vital Signs Last 24 Hrs  T(C): 36.1 (16 Jul 2021 05:19), Max: 37.2 (16 Jul 2021 01:23)  T(F): 97 (16 Jul 2021 05:19), Max: 99 (16 Jul 2021 01:23)  HR: 130 (16 Jul 2021 10:00) (84 - 136)  BP: 146/61 (15 Jul 2021 16:15) (146/61 - 146/61)  BP(mean): 88 (15 Jul 2021 16:15) (88 - 88)  RR: 16 (16 Jul 2021 07:00) (16 - 20)  SpO2: 96% (16 Jul 2021 10:00) (90% - 99%)  Daily     Daily     Constitutional: NAD	  HEENT:   Normal oral mucosa  Neck: + RJ TLC   CVS: Normal S1 / S2, irregular / tachycardic, sternal wound intact. + epicardial temporary pacing wires (VVI back up at 40 bpm). + Mediastinal blakes in place   Pulm: CTA. No wheeze or rale  GI:  + BS, soft, NT / ND   Ext: No LE edema. Compression stocking on.   Vascular: Peripheral pulses palpable 2+   MS: deferred  Neurologic: A&O x 3, Non-focal  Psych: Pleasant, has good insight  Skin: No rash or lesion       	  LABS:	                         8.3    10.18 )-----------( 89       ( 16 Jul 2021 10:32 )             23.5     07-16    135  |  99  |  32<H>  ----------------------------<  171<H>  3.7   |  23  |  1.67<H>    Ca    7.8<L>      16 Jul 2021 05:54  Phos  3.2     07-16  Mg     2.3     07-16    TPro  5.6<L>  /  Alb  3.8  /  TBili  0.5  /  DBili  x   /  AST  25  /  ALT  <5<L>  /  AlkPhos  34<L>  07-16    proBNP:   Lipid Profile:   HgA1c:   TSH: 	      EKG:   Telemetry:     Echo:    Electrophysiology Consult Note:     CHIEF COMPLAINT:  Patient is a 75y old  Male who presents with a chief complaint of Cardiac cath (15 Jul 2021 22:53)        HISTORY OF PRESENT ILLNESS:   COVID vaccination- Pfizer 2nd dose 21  [facility] [place]    Outpatient Cardiologist -- Dr. Morales     76 yo M with PMHx of HTN, HLD, paroxysmal Afib (on Xarelto 15 mg daily and Sotalol 80 mg bid), DM, CKD III, bacterial endocarditis  treated with IV Abx, moderate MR and severe AI, normal EF on echo, non-obstructive CAD on cath 21, now s/p bioprosthetic AVR, MV repair, MAZE and ANGELO clip on 21.   Pt presented in NSR.  Postop on , pt went back to AFIB.  Started on AMIO bolus/gtt and now PO load 400 mg TID.   Telemetry noted AFIB and intermittent aflutter. VR 90-100s when in AFIB and up to 120-130s when in aflutter.  He does not feel the irregular pulse / rapid beats while here.   Pt states that he has pafib for few years, "asymptomatic" from it per pt.  He is very active prior to surgery.  No h/o syncope. He was on Sotalol 80 mg bid up till this admission.     Currently on low dose Heparin gtt.      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  Enciso with hematuria.           PAST MEDICAL & SURGICAL HISTORY:  Hypertension  Hyperlipidemia  Afib  Diabetes mellitus  Stage 3 chronic kidney disease  H/O bacterial endocarditis  Severe aortic insufficiency  Moderate mitral regurgitation  S/P rotator cuff repair  S/P tonsillectomy      FAMILY HISTORY:  No pertinent family history in first degree relatives      SOCIAL HISTORY:    Pt denies tobacco use, reports weekly EtOH use, denies drug use.    Allergies  No Known Allergies      MEDICATIONS  (STANDING):  albumin human  5% IVPB 250 milliLiter(s) IV Intermittent every 30 minutes  albumin human  5% IVPB 250 milliLiter(s) IV Intermittent every 30 minutes  albumin human  5% IVPB 250 milliLiter(s) IV Intermittent every 30 minutes  aMIOdarone    Tablet 400 milliGRAM(s) Oral every 8 hours  aMIOdarone    Tablet   Oral   aspirin enteric coated 81 milliGRAM(s) Oral daily  atorvastatin 40 milliGRAM(s) Oral at bedtime  gabapentin 300 milliGRAM(s) Oral every 12 hours  heparin  Infusion 800 Unit(s)/Hr (8 mL/Hr) IV Continuous <Continuous>  insulin glargine Injectable (LANTUS) 14 Unit(s) SubCutaneous at bedtime  insulin lispro (ADMELOG) corrective regimen sliding scale   SubCutaneous Before meals and at bedtime  insulin lispro Injectable (ADMELOG) 4 Unit(s) SubCutaneous three times a day before meals  pantoprazole    Tablet 40 milliGRAM(s) Oral before breakfast  polyethylene glycol 3350 17 Gram(s) Oral daily  senna 2 Tablet(s) Oral at bedtime  sodium chloride 0.9%. 1000 milliLiter(s) (10 mL/Hr) IV Continuous <Continuous>    MEDICATIONS  (PRN):  acetaminophen   Tablet .. 650 milliGRAM(s) Oral every 6 hours PRN Mild Pain (1 - 3)  oxyCODONE    IR 5 milliGRAM(s) Oral every 4 hours PRN Moderate Pain (4 - 6)  oxyCODONE    IR 10 milliGRAM(s) Oral every 6 hours PRN Severe Pain (7 - 10)      REVIEW OF SYSTEMS:  CONSTITUTIONAL: No fever, weight loss.  + fatigue  EYES: No eye pain, visual disturbances, or discharge  ENMT:  No difficulty hearing, tinnitus, vertigo; No sinus or throat pain  NECK: No pain or stiffness  RESPIRATORY: No cough, wheezing, chills or hemoptysis; No Shortness of Breath  CARDIOVASCULAR: Tolerating surgical wound pain.  no palpitations/ dizziness/SOb   GASTROINTESTINAL: No abdominal or epigastric pain. No nausea, vomiting, or hematemesis; No diarrhea or constipation. No melena or hematochezia.  GENITOURINARY: No dysuria, frequency, hematuria, or incontinence  NEUROLOGICAL: No headaches, memory loss, loss of strength, numbness, or tremors  SKIN: No itching, burning, rashes, or lesions   LYMPH Nodes: No enlarged glands  ENDOCRINE: No heat or cold intolerance; No hair loss  MUSCULOSKELETAL: No joint pain or swelling; No muscle, back, or extremity pain  PSYCHIATRIC: No depression, anxiety, mood swings, or difficulty sleeping  HEME/LYMPH: No easy bruising, or bleeding gums  ALLERY AND IMMUNOLOGIC: No hives or eczema	      PHYSICAL EXAM:  Vital Signs Last 24 Hrs  T(C): 36.1 (2021 05:19), Max: 37.2 (2021 01:23)  T(F): 97 (2021 05:19), Max: 99 (2021 01:23)  HR: 130 (2021 10:00) (84 - 136)  BP: 146/61 (15 Jul 2021 16:15) (146/61 - 146/61)  BP(mean): 88 (15 Jul 2021 16:15) (88 - 88)  RR: 16 (2021 07:00) (16 - 20)  SpO2: 96% (2021 10:00) (90% - 99%)  Daily     Daily     Constitutional: NAD	  HEENT:   Normal oral mucosa  Neck: + RJ TLC   CVS: Normal S1 / S2, irregular / tachycardic, sternal wound intact. + epicardial temporary pacing wires (VVI back up at 40 bpm). + Mediastinal blakes in place   Pulm: CTA. No wheeze or rale  GI:  + BS, soft, NT / ND   Ext: No LE edema. Compression stocking on.   Vascular: Peripheral pulses palpable 2+   MS: deferred  Neurologic: A&O x 3, Non-focal  Psych: Pleasant, has good insight  Skin: No rash or lesion       	  LABS:	                         8.3    10.18 )-----------( 89       ( 2021 10:32 )             23.5     07-16    135  |  99  |  32<H>  ----------------------------<  171<H>  3.7   |  23  |  1.67<H>    Ca    7.8<L>      2021 05:54  Phos  3.2       Mg     2.3     16    TPro  5.6<L>  /  Alb  3.8  /  TBili  0.5  /  DBili  x   /  AST  25  /  ALT  <5<L>  /  AlkPhos  34<L>        EK21: SB 53 bpm Normal intervals (Pr 168 ms. QRs 94 ms. QTc 439 ms)   EKG 21: NSR 77 bpm. Normal intervals ( ms. QRS 86 ms. Qtc 459 ms).

## 2021-07-16 NOTE — CONSULT NOTE ADULT - ASSESSMENT
76 yo M with PMHx of HTN, HLD, paroxysmal Afib (on Xarelto 15 mg daily and Sotalol 80 mg bid), DM, CKD III, bacterial endocarditis 2015 treated with IV Abx, moderate MR and severe AI, normal EF on echo, 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   Pt presented in NSR.  Postop on 7/14, pt went back to AFIB.  Started on AMIO bolus/gtt and now PO load 400 mg TID.   Telemetry noted AFIB with occasional aflutter (likely from organizing afib due to being on amio).  VR 90-100s when in AFIB and up to 120-130s when in organized afib (ie aflutter).  Asymptomatic.   - AFIB and sometimes aflutter RVR on telemetry.  Rate would be difficult to control when in aflutter as compared to when he is in af.  When in Aflutter RVR, his SBP is in mid 90-100s.  Consider beta-blocker and may need to dccv next week if remains in this rhythm.  Will d/w Dr. Mckenna shortly on final recommendation 74 yo M with PMHx of HTN, HLD, paroxysmal Afib (on Xarelto 15 mg daily and Sotalol 80 mg bid), DM, CKD III, bacterial endocarditis 2015 treated with IV Abx, moderate MR and severe AI, normal EF on echo, 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   Pt presented in NSR.  Postop on 7/14, pt went back to AFIB.  Started on AMIO bolus/gtt and now PO load 400 mg TID.   Telemetry noted AFIB with occasional aflutter (typical CTI dependent looking aflutter).  VR 90-100s when in AFIB and up to 120-130s when in aflutter.   Asymptomatic.    - AFIB and sometimes aflutter RVR on telemetry.  Rate would be difficult to control when in aflutter as compared to when he is in af.  When in Aflutter RVR, his SBP is in mid 90-100s.   While interviewing pt, it was noted that he self converted from aflutter to junctional 70s then quickly back to sinus ~80 bpm with PAC.  No conversion pause or need for pacing via temporary epicardial wire. Continue monitoring.  I am hoping that he will stay in sinus as Amio is building up in his body.

## 2021-07-17 LAB
ALBUMIN SERPL ELPH-MCNC: 3.7 G/DL — SIGNIFICANT CHANGE UP (ref 3.3–5)
ALBUMIN SERPL ELPH-MCNC: 4 G/DL — SIGNIFICANT CHANGE UP (ref 3.3–5)
ALP SERPL-CCNC: 34 U/L — LOW (ref 40–120)
ALP SERPL-CCNC: 51 U/L — SIGNIFICANT CHANGE UP (ref 40–120)
ALT FLD-CCNC: 24 U/L — SIGNIFICANT CHANGE UP (ref 10–45)
ALT FLD-CCNC: 41 U/L — SIGNIFICANT CHANGE UP (ref 10–45)
ANION GAP SERPL CALC-SCNC: 10 MMOL/L — SIGNIFICANT CHANGE UP (ref 5–17)
ANION GAP SERPL CALC-SCNC: 12 MMOL/L — SIGNIFICANT CHANGE UP (ref 5–17)
APTT BLD: 26.8 SEC — LOW (ref 27.5–35.5)
APTT BLD: 29.8 SEC — SIGNIFICANT CHANGE UP (ref 27.5–35.5)
AST SERPL-CCNC: 53 U/L — HIGH (ref 10–40)
AST SERPL-CCNC: 68 U/L — HIGH (ref 10–40)
BILIRUB SERPL-MCNC: 0.6 MG/DL — SIGNIFICANT CHANGE UP (ref 0.2–1.2)
BILIRUB SERPL-MCNC: 0.8 MG/DL — SIGNIFICANT CHANGE UP (ref 0.2–1.2)
BLD GP AB SCN SERPL QL: NEGATIVE — SIGNIFICANT CHANGE UP
BUN SERPL-MCNC: 43 MG/DL — HIGH (ref 7–23)
BUN SERPL-MCNC: 55 MG/DL — HIGH (ref 7–23)
CALCIUM SERPL-MCNC: 7.7 MG/DL — LOW (ref 8.4–10.5)
CALCIUM SERPL-MCNC: 8.2 MG/DL — LOW (ref 8.4–10.5)
CHLORIDE SERPL-SCNC: 93 MMOL/L — LOW (ref 96–108)
CHLORIDE SERPL-SCNC: 97 MMOL/L — SIGNIFICANT CHANGE UP (ref 96–108)
CO2 SERPL-SCNC: 22 MMOL/L — SIGNIFICANT CHANGE UP (ref 22–31)
CO2 SERPL-SCNC: 25 MMOL/L — SIGNIFICANT CHANGE UP (ref 22–31)
CREAT SERPL-MCNC: 2.27 MG/DL — HIGH (ref 0.5–1.3)
CREAT SERPL-MCNC: 2.48 MG/DL — HIGH (ref 0.5–1.3)
GAS PNL BLDA: SIGNIFICANT CHANGE UP
GLUCOSE BLDC GLUCOMTR-MCNC: 151 MG/DL — HIGH (ref 70–99)
GLUCOSE BLDC GLUCOMTR-MCNC: 155 MG/DL — HIGH (ref 70–99)
GLUCOSE BLDC GLUCOMTR-MCNC: 165 MG/DL — HIGH (ref 70–99)
GLUCOSE BLDC GLUCOMTR-MCNC: 221 MG/DL — HIGH (ref 70–99)
GLUCOSE SERPL-MCNC: 129 MG/DL — HIGH (ref 70–99)
GLUCOSE SERPL-MCNC: 212 MG/DL — HIGH (ref 70–99)
HCT VFR BLD CALC: 21.6 % — LOW (ref 39–50)
HCT VFR BLD CALC: 25 % — LOW (ref 39–50)
HGB BLD-MCNC: 7.4 G/DL — LOW (ref 13–17)
HGB BLD-MCNC: 8.6 G/DL — LOW (ref 13–17)
INR BLD: 1.2 — HIGH (ref 0.88–1.16)
INR BLD: 1.52 — HIGH (ref 0.88–1.16)
MAGNESIUM SERPL-MCNC: 2.3 MG/DL — SIGNIFICANT CHANGE UP (ref 1.6–2.6)
MAGNESIUM SERPL-MCNC: 2.4 MG/DL — SIGNIFICANT CHANGE UP (ref 1.6–2.6)
MCHC RBC-ENTMCNC: 29.8 PG — SIGNIFICANT CHANGE UP (ref 27–34)
MCHC RBC-ENTMCNC: 30 PG — SIGNIFICANT CHANGE UP (ref 27–34)
MCHC RBC-ENTMCNC: 34.3 GM/DL — SIGNIFICANT CHANGE UP (ref 32–36)
MCHC RBC-ENTMCNC: 34.4 GM/DL — SIGNIFICANT CHANGE UP (ref 32–36)
MCV RBC AUTO: 86.5 FL — SIGNIFICANT CHANGE UP (ref 80–100)
MCV RBC AUTO: 87.4 FL — SIGNIFICANT CHANGE UP (ref 80–100)
NRBC # BLD: 0 /100 WBCS — SIGNIFICANT CHANGE UP (ref 0–0)
NRBC # BLD: 0 /100 WBCS — SIGNIFICANT CHANGE UP (ref 0–0)
PHOSPHATE SERPL-MCNC: 3.9 MG/DL — SIGNIFICANT CHANGE UP (ref 2.5–4.5)
PHOSPHATE SERPL-MCNC: 4.8 MG/DL — HIGH (ref 2.5–4.5)
PLATELET # BLD AUTO: 107 K/UL — LOW (ref 150–400)
PLATELET # BLD AUTO: 134 K/UL — LOW (ref 150–400)
POTASSIUM SERPL-MCNC: 3.9 MMOL/L — SIGNIFICANT CHANGE UP (ref 3.5–5.3)
POTASSIUM SERPL-MCNC: 4 MMOL/L — SIGNIFICANT CHANGE UP (ref 3.5–5.3)
POTASSIUM SERPL-SCNC: 3.9 MMOL/L — SIGNIFICANT CHANGE UP (ref 3.5–5.3)
POTASSIUM SERPL-SCNC: 4 MMOL/L — SIGNIFICANT CHANGE UP (ref 3.5–5.3)
PROT SERPL-MCNC: 5.6 G/DL — LOW (ref 6–8.3)
PROT SERPL-MCNC: 6 G/DL — SIGNIFICANT CHANGE UP (ref 6–8.3)
PROTHROM AB SERPL-ACNC: 14.3 SEC — HIGH (ref 10.6–13.6)
PROTHROM AB SERPL-ACNC: 17.9 SEC — HIGH (ref 10.6–13.6)
RBC # BLD: 2.47 M/UL — LOW (ref 4.2–5.8)
RBC # BLD: 2.89 M/UL — LOW (ref 4.2–5.8)
RBC # FLD: 13 % — SIGNIFICANT CHANGE UP (ref 10.3–14.5)
RBC # FLD: 13 % — SIGNIFICANT CHANGE UP (ref 10.3–14.5)
RH IG SCN BLD-IMP: POSITIVE — SIGNIFICANT CHANGE UP
SODIUM SERPL-SCNC: 127 MMOL/L — LOW (ref 135–145)
SODIUM SERPL-SCNC: 132 MMOL/L — LOW (ref 135–145)
WBC # BLD: 9.2 K/UL — SIGNIFICANT CHANGE UP (ref 3.8–10.5)
WBC # BLD: 9.66 K/UL — SIGNIFICANT CHANGE UP (ref 3.8–10.5)
WBC # FLD AUTO: 9.2 K/UL — SIGNIFICANT CHANGE UP (ref 3.8–10.5)
WBC # FLD AUTO: 9.66 K/UL — SIGNIFICANT CHANGE UP (ref 3.8–10.5)

## 2021-07-17 PROCEDURE — 76604 US EXAM CHEST: CPT | Mod: 26

## 2021-07-17 PROCEDURE — 99292 CRITICAL CARE ADDL 30 MIN: CPT

## 2021-07-17 PROCEDURE — 71045 X-RAY EXAM CHEST 1 VIEW: CPT | Mod: 26

## 2021-07-17 PROCEDURE — 99291 CRITICAL CARE FIRST HOUR: CPT

## 2021-07-17 RX ORDER — BUMETANIDE 0.25 MG/ML
2 INJECTION INTRAMUSCULAR; INTRAVENOUS ONCE
Refills: 0 | Status: COMPLETED | OUTPATIENT
Start: 2021-07-17 | End: 2021-07-17

## 2021-07-17 RX ORDER — INSULIN LISPRO 100/ML
6 VIAL (ML) SUBCUTANEOUS
Refills: 0 | Status: DISCONTINUED | OUTPATIENT
Start: 2021-07-17 | End: 2021-07-21

## 2021-07-17 RX ORDER — POTASSIUM CHLORIDE 20 MEQ
20 PACKET (EA) ORAL ONCE
Refills: 0 | Status: COMPLETED | OUTPATIENT
Start: 2021-07-17 | End: 2021-07-17

## 2021-07-17 RX ORDER — HEPARIN SODIUM 5000 [USP'U]/ML
5000 INJECTION INTRAVENOUS; SUBCUTANEOUS EVERY 8 HOURS
Refills: 0 | Status: DISCONTINUED | OUTPATIENT
Start: 2021-07-17 | End: 2021-07-18

## 2021-07-17 RX ORDER — CALCIUM GLUCONATE 100 MG/ML
2 VIAL (ML) INTRAVENOUS ONCE
Refills: 0 | Status: COMPLETED | OUTPATIENT
Start: 2021-07-17 | End: 2021-07-17

## 2021-07-17 RX ORDER — INSULIN GLARGINE 100 [IU]/ML
15 INJECTION, SOLUTION SUBCUTANEOUS AT BEDTIME
Refills: 0 | Status: DISCONTINUED | OUTPATIENT
Start: 2021-07-17 | End: 2021-07-21

## 2021-07-17 RX ADMIN — GABAPENTIN 300 MILLIGRAM(S): 400 CAPSULE ORAL at 18:25

## 2021-07-17 RX ADMIN — OXYCODONE HYDROCHLORIDE 10 MILLIGRAM(S): 5 TABLET ORAL at 21:50

## 2021-07-17 RX ADMIN — GABAPENTIN 300 MILLIGRAM(S): 400 CAPSULE ORAL at 06:05

## 2021-07-17 RX ADMIN — CHLORHEXIDINE GLUCONATE 1 APPLICATION(S): 213 SOLUTION TOPICAL at 06:05

## 2021-07-17 RX ADMIN — AMIODARONE HYDROCHLORIDE 400 MILLIGRAM(S): 400 TABLET ORAL at 14:30

## 2021-07-17 RX ADMIN — Medication 4 UNIT(S): at 12:12

## 2021-07-17 RX ADMIN — OXYCODONE HYDROCHLORIDE 10 MILLIGRAM(S): 5 TABLET ORAL at 20:50

## 2021-07-17 RX ADMIN — Medication 2: at 17:01

## 2021-07-17 RX ADMIN — ATORVASTATIN CALCIUM 40 MILLIGRAM(S): 80 TABLET, FILM COATED ORAL at 21:04

## 2021-07-17 RX ADMIN — Medication 200 GRAM(S): at 03:43

## 2021-07-17 RX ADMIN — INSULIN GLARGINE 15 UNIT(S): 100 INJECTION, SOLUTION SUBCUTANEOUS at 21:06

## 2021-07-17 RX ADMIN — Medication 4 UNIT(S): at 07:39

## 2021-07-17 RX ADMIN — Medication 4 UNIT(S): at 17:01

## 2021-07-17 RX ADMIN — Medication 2: at 07:39

## 2021-07-17 RX ADMIN — Medication 2: at 12:12

## 2021-07-17 RX ADMIN — Medication 4: at 21:04

## 2021-07-17 RX ADMIN — Medication 81 MILLIGRAM(S): at 12:11

## 2021-07-17 RX ADMIN — Medication 20 MILLIEQUIVALENT(S): at 22:47

## 2021-07-17 RX ADMIN — AMIODARONE HYDROCHLORIDE 400 MILLIGRAM(S): 400 TABLET ORAL at 06:05

## 2021-07-17 RX ADMIN — AMIODARONE HYDROCHLORIDE 400 MILLIGRAM(S): 400 TABLET ORAL at 21:04

## 2021-07-17 RX ADMIN — PANTOPRAZOLE SODIUM 40 MILLIGRAM(S): 20 TABLET, DELAYED RELEASE ORAL at 06:05

## 2021-07-17 RX ADMIN — BUMETANIDE 2 MILLIGRAM(S): 0.25 INJECTION INTRAMUSCULAR; INTRAVENOUS at 05:16

## 2021-07-17 NOTE — PROGRESS NOTE ADULT - SUBJECTIVE AND OBJECTIVE BOX
INTERVAL HPI/OVERNIGHT EVENTS:    POD#4 AVR/MR repair/CryoMAZE; ANGELO occlusion  EF normal     76yo Male w/Hx HTN, HLD, Afib (Xarelto), DM, CKD III, Bacterial Endocarditis '15 - Tx Abx, Known mod MR/Severe AI presenting for eval of known valvular disease - active patient/no reported sxs    ECHO 6/1: normal EF, mod-severe LV dilatation, severe AI cannot exclude bicuspid AV, moderate MR. I    Cath 7/12: LM normal, LAD mild disease, LCx mild  disease, RCA dominant, mild luminal irregularities, No LV gram performed. EDP 10mmHg, No AS, R Radial TR     to OR 7/13:   IntraOp: 1 U pRBC/3L crystalloid given  arrived to ICU on Epi/Primacor   Initial CI >2.4    Extubated in short post-op period  Epi titrated down to off (LA 3 - repeat/most recent 1.3)  remains on low dose Primacor 0.25  Amiodarone infusion started (midnight)  Vaso titrated off     post-op atrial Fib - EP following; xarelto restarted (Fib PTA)  tolerating NC  currently OOB in chair     given bumex dosing overnight with favorable response  no acute events overnight     PMHx includes but is not limited to:   Hypertension  Hyperlipidemia  Afib  Diabetes mellitus  CKD III  Hx bacterial endocarditis  Severe aortic insufficiency  Moderate mitral regurgitation  S/P rotator cuff repair  S/P tonsillectomy    ICU Vital Signs Last 24 Hrs  T(C): 36.2 (17 Jul 2021 05:26), Max: 36.9 (16 Jul 2021 14:00)  T(F): 97.2 (17 Jul 2021 05:26), Max: 98.4 (16 Jul 2021 14:00)  HR: 72 (17 Jul 2021 13:00) (64 - 91) fib   BP: 128/61 (17 Jul 2021 13:00) (122/60 - 128/61)  BP(mean): 88 (17 Jul 2021 13:00) (85 - 88)  ABP: 125/55 (17 Jul 2021 09:00) (93/39 - 155/61)  ABP(mean): 74 (17 Jul 2021 09:00) (55 - 89)  RR: 18 (17 Jul 2021 13:00) (16 - 18)  SpO2: 94% (17 Jul 2021 13:00) (92% - 100%)    Qtts: None     I&O's Summary    16 Jul 2021 07:01  -  17 Jul 2021 07:00  --------------------------------------------------------  IN: 1806.2 mL / OUT: 1720 mL / NET: 86.2 mL    17 Jul 2021 07:01  -  17 Jul 2021 13:45  --------------------------------------------------------  IN: 50 mL / OUT: 80 mL / NET: -30 mL    Physical Exam        LABS:                        7.4    9.66  )-----------( 107      ( 17 Jul 2021 03:01 )             21.6     07-17    132<L>  |  97  |  43<H>  ----------------------------<  129<H>  4.0   |  25  |  2.27<H>    Ca    7.7<L>      17 Jul 2021 03:01  Phos  4.8     07-17  Mg     2.4     07-17    TPro  5.6<L>  /  Alb  4.0  /  TBili  0.6  /  DBili  x   /  AST  53<H>  /  ALT  24  /  AlkPhos  34<L>  07-17    PT/INR - ( 17 Jul 2021 02:43 )   PT: 17.9 sec;   INR: 1.52     PTT - ( 17 Jul 2021 02:43 )  PTT:29.8 sec    ABG - ( 17 Jul 2021 02:37 )  pH, Arterial: 7.39  pH, Blood: x     /  pCO2: 44    /  pO2: 78    / HCO3: 27    / Base Excess: 1.2   /  SaO2: 100.0     RADIOLOGY & ADDITIONAL STUDIES: reviewed     Patient with chronic atrial fib and known valvular disease now POD#4 - doing well    1. CV  hemodynamically stable  on amiodarone infusion - transition to po amio; monitor rhythm  telemetry - rate controlled Fib alternating with intermittent sinus  if remains in Fib later today - plan start low dose heparin infusion for systemic AC  cont Milrinone 0.25 for now - assess need and ability to titrate over time  ASA/statin  LA 1.3    2. Pulm   titrate supplemental oxygen down to off as clinical scenario allows  ambulation w/staff assist  incentive spirometry  monitor chest tube output  atelectasis RLL noted     3. Endocrine  Hx DM - poor glycemic control  insulin regimen adjusted this am to obtain/maintain glycemic control (lantus/premeal/ISS)  HgA1c 7.0    pain management  bowel regimen     DVT and GI prophylaxis    d/w patient/staff and CTS        I have spent/provided stated minutes of critical care time to this patient: 90  INTERVAL HPI/OVERNIGHT EVENTS:    POD#4 AVR/MR repair/CryoMAZE; ANGELO occlusion  EF normal     76yo Male w/Hx HTN, HLD, Afib (Xarelto), DM, CKD III, Bacterial Endocarditis '15 - Tx Abx, Known mod MR/Severe AI presenting for eval of known valvular disease - active patient/no reported sxs    ECHO 6/1: normal EF, mod-severe LV dilatation, severe AI cannot exclude bicuspid AV, moderate MR. I    Cath 7/12: LM normal, LAD mild disease, LCx mild  disease, RCA dominant, mild luminal irregularities, No LV gram performed. EDP 10mmHg, No AS, R Radial TR     to OR 7/13:   IntraOp: 1 U pRBC/3L crystalloid given  arrived to ICU on Epi/Primacor   Initial CI >2.4    Extubated in short post-op period  Epi titrated down to off (LA 3 - repeat/most recent 1.3)  remains on low dose Primacor 0.25  Amiodarone infusion started (midnight)  Vaso titrated off     post-op atrial Fib - EP following; xarelto restarted (Fib PTA)  tolerating NC  currently OOB in chair     given bumex dosing overnight with favorable response  no acute events overnight     PMHx includes but is not limited to:   Hypertension  Hyperlipidemia  Afib  Diabetes mellitus  CKD III  Hx bacterial endocarditis  Severe aortic insufficiency  Moderate mitral regurgitation  S/P rotator cuff repair  S/P tonsillectomy    ICU Vital Signs Last 24 Hrs  T(C): 36.2 (17 Jul 2021 05:26), Max: 36.9 (16 Jul 2021 14:00)  T(F): 97.2 (17 Jul 2021 05:26), Max: 98.4 (16 Jul 2021 14:00)  HR: 72 (17 Jul 2021 13:00) (64 - 91) fib   BP: 128/61 (17 Jul 2021 13:00) (122/60 - 128/61)  BP(mean): 88 (17 Jul 2021 13:00) (85 - 88)  ABP: 125/55 (17 Jul 2021 09:00) (93/39 - 155/61)  ABP(mean): 74 (17 Jul 2021 09:00) (55 - 89)  RR: 18 (17 Jul 2021 13:00) (16 - 18)  SpO2: 94% (17 Jul 2021 13:00) (92% - 100%)    Qtts: None     I&O's Summary    16 Jul 2021 07:01  -  17 Jul 2021 07:00  --------------------------------------------------------  IN: 1806.2 mL / OUT: 1720 mL / NET: 86.2 mL    17 Jul 2021 07:01  -  17 Jul 2021 13:45  --------------------------------------------------------  IN: 50 mL / OUT: 80 mL / NET: -30 mL    Physical Exam    Heart - irregular irregular (-)rub/gallop  Lungs - CTA anterior (-) rhonchi/wheeze  Abd - (+)BS soft NTND (-)r/r/g  Ext - warm to touch; no cyanosis/clubbing  Chest - incision site clean and dry  Neuro - alert/oriented and interactive - non-focal   Skin - no rash    LABS:                        7.4    9.66  )-----------( 107      ( 17 Jul 2021 03:01 )             21.6     07-17    132<L>  |  97  |  43<H>  ----------------------------<  129<H>  4.0   |  25  |  2.27<H>    Ca    7.7<L>      17 Jul 2021 03:01  Phos  4.8     07-17  Mg     2.4     07-17    TPro  5.6<L>  /  Alb  4.0  /  TBili  0.6  /  DBili  x   /  AST  53<H>  /  ALT  24  /  AlkPhos  34<L>  07-17    PT/INR - ( 17 Jul 2021 02:43 )   PT: 17.9 sec;   INR: 1.52     PTT - ( 17 Jul 2021 02:43 )  PTT:29.8 sec    ABG - ( 17 Jul 2021 02:37 )  pH, Arterial: 7.39  pH, Blood: x     /  pCO2: 44    /  pO2: 78    / HCO3: 27    / Base Excess: 1.2   /  SaO2: 100.0     RADIOLOGY & ADDITIONAL STUDIES: reviewed     Patient with chronic atrial fib and known valvular disease now POD#4 - doing well    1. CV  hemodynamically stable  po amio; monitor rhythm  telemetry - rate controlled Fib alternating with intermittent sinus  low dose heparin infusion for systemic AC transitioned to Xarelto - to hold tonight anticipating thoracentesis 7/18  off Milrinone   ASA/statin    2. Pulm   titrate supplemental oxygen down to off as clinical scenario allows - on 2L NC at this time   ambulation w/staff assist  incentive spirometry  monitor chest tube output  atelectasis RLL noted  - serial bedside sono - effusion (approx 500cc) noted with good window - hold xarelto dosing tonight     3. Endocrine  Hx DM - poor glycemic control  insulin regimen adjusted this am to obtain/maintain glycemic control (lantus/premeal/ISS)  /151  HgA1c 7.0    pain management  bowel regimen  - July 17th     DVT and GI prophylaxis    d/w patient/staff and CTS    I have spent/provided stated minutes of critical care time to this patient: 90

## 2021-07-18 LAB
ANION GAP SERPL CALC-SCNC: 13 MMOL/L — SIGNIFICANT CHANGE UP (ref 5–17)
ANION GAP SERPL CALC-SCNC: 15 MMOL/L — SIGNIFICANT CHANGE UP (ref 5–17)
APTT BLD: 27.1 SEC — LOW (ref 27.5–35.5)
BUN SERPL-MCNC: 56 MG/DL — HIGH (ref 7–23)
BUN SERPL-MCNC: 60 MG/DL — HIGH (ref 7–23)
CALCIUM SERPL-MCNC: 8.3 MG/DL — LOW (ref 8.4–10.5)
CALCIUM SERPL-MCNC: 8.3 MG/DL — LOW (ref 8.4–10.5)
CHLORIDE SERPL-SCNC: 94 MMOL/L — LOW (ref 96–108)
CHLORIDE SERPL-SCNC: 96 MMOL/L — SIGNIFICANT CHANGE UP (ref 96–108)
CO2 SERPL-SCNC: 23 MMOL/L — SIGNIFICANT CHANGE UP (ref 22–31)
CO2 SERPL-SCNC: 24 MMOL/L — SIGNIFICANT CHANGE UP (ref 22–31)
CREAT SERPL-MCNC: 2.24 MG/DL — HIGH (ref 0.5–1.3)
CREAT SERPL-MCNC: 2.29 MG/DL — HIGH (ref 0.5–1.3)
GLUCOSE BLDC GLUCOMTR-MCNC: 166 MG/DL — HIGH (ref 70–99)
GLUCOSE BLDC GLUCOMTR-MCNC: 190 MG/DL — HIGH (ref 70–99)
GLUCOSE BLDC GLUCOMTR-MCNC: 202 MG/DL — HIGH (ref 70–99)
GLUCOSE BLDC GLUCOMTR-MCNC: 93 MG/DL — SIGNIFICANT CHANGE UP (ref 70–99)
GLUCOSE SERPL-MCNC: 101 MG/DL — HIGH (ref 70–99)
GLUCOSE SERPL-MCNC: 159 MG/DL — HIGH (ref 70–99)
HCT VFR BLD CALC: 25.4 % — LOW (ref 39–50)
HCT VFR BLD CALC: 25.7 % — LOW (ref 39–50)
HGB BLD-MCNC: 8.8 G/DL — LOW (ref 13–17)
HGB BLD-MCNC: 8.9 G/DL — LOW (ref 13–17)
INR BLD: 1.11 — SIGNIFICANT CHANGE UP (ref 0.88–1.16)
MAGNESIUM SERPL-MCNC: 2.4 MG/DL — SIGNIFICANT CHANGE UP (ref 1.6–2.6)
MAGNESIUM SERPL-MCNC: 2.5 MG/DL — SIGNIFICANT CHANGE UP (ref 1.6–2.6)
MCHC RBC-ENTMCNC: 29.8 PG — SIGNIFICANT CHANGE UP (ref 27–34)
MCHC RBC-ENTMCNC: 30.9 PG — SIGNIFICANT CHANGE UP (ref 27–34)
MCHC RBC-ENTMCNC: 34.2 GM/DL — SIGNIFICANT CHANGE UP (ref 32–36)
MCHC RBC-ENTMCNC: 35 GM/DL — SIGNIFICANT CHANGE UP (ref 32–36)
MCV RBC AUTO: 87.1 FL — SIGNIFICANT CHANGE UP (ref 80–100)
MCV RBC AUTO: 88.2 FL — SIGNIFICANT CHANGE UP (ref 80–100)
NRBC # BLD: 0 /100 WBCS — SIGNIFICANT CHANGE UP (ref 0–0)
NRBC # BLD: 0 /100 WBCS — SIGNIFICANT CHANGE UP (ref 0–0)
PHOSPHATE SERPL-MCNC: 4.8 MG/DL — HIGH (ref 2.5–4.5)
PLATELET # BLD AUTO: 148 K/UL — LOW (ref 150–400)
PLATELET # BLD AUTO: 163 K/UL — SIGNIFICANT CHANGE UP (ref 150–400)
POTASSIUM SERPL-MCNC: 4.1 MMOL/L — SIGNIFICANT CHANGE UP (ref 3.5–5.3)
POTASSIUM SERPL-MCNC: 4.2 MMOL/L — SIGNIFICANT CHANGE UP (ref 3.5–5.3)
POTASSIUM SERPL-SCNC: 4.1 MMOL/L — SIGNIFICANT CHANGE UP (ref 3.5–5.3)
POTASSIUM SERPL-SCNC: 4.2 MMOL/L — SIGNIFICANT CHANGE UP (ref 3.5–5.3)
PROTHROM AB SERPL-ACNC: 13.3 SEC — SIGNIFICANT CHANGE UP (ref 10.6–13.6)
RBC # BLD: 2.88 M/UL — LOW (ref 4.2–5.8)
RBC # BLD: 2.95 M/UL — LOW (ref 4.2–5.8)
RBC # FLD: 12.7 % — SIGNIFICANT CHANGE UP (ref 10.3–14.5)
RBC # FLD: 13 % — SIGNIFICANT CHANGE UP (ref 10.3–14.5)
SODIUM SERPL-SCNC: 131 MMOL/L — LOW (ref 135–145)
SODIUM SERPL-SCNC: 134 MMOL/L — LOW (ref 135–145)
WBC # BLD: 10.18 K/UL — SIGNIFICANT CHANGE UP (ref 3.8–10.5)
WBC # BLD: 11 K/UL — HIGH (ref 3.8–10.5)
WBC # FLD AUTO: 10.18 K/UL — SIGNIFICANT CHANGE UP (ref 3.8–10.5)
WBC # FLD AUTO: 11 K/UL — HIGH (ref 3.8–10.5)

## 2021-07-18 PROCEDURE — 99291 CRITICAL CARE FIRST HOUR: CPT

## 2021-07-18 PROCEDURE — 71045 X-RAY EXAM CHEST 1 VIEW: CPT | Mod: 26

## 2021-07-18 PROCEDURE — 71045 X-RAY EXAM CHEST 1 VIEW: CPT | Mod: 26,77

## 2021-07-18 PROCEDURE — 32557 INSERT CATH PLEURA W/ IMAGE: CPT | Mod: 50

## 2021-07-18 RX ORDER — RIVAROXABAN 15 MG-20MG
15 KIT ORAL
Refills: 0 | Status: DISCONTINUED | OUTPATIENT
Start: 2021-07-18 | End: 2021-07-21

## 2021-07-18 RX ORDER — ALBUMIN HUMAN 25 %
250 VIAL (ML) INTRAVENOUS ONCE
Refills: 0 | Status: COMPLETED | OUTPATIENT
Start: 2021-07-18 | End: 2021-07-18

## 2021-07-18 RX ORDER — PROCAINAMIDE HCL 500 MG
1000 TABLET, EXTENDED RELEASE ORAL ONCE
Refills: 0 | Status: COMPLETED | OUTPATIENT
Start: 2021-07-18 | End: 2021-07-18

## 2021-07-18 RX ADMIN — Medication 260 MILLIGRAM(S): at 16:26

## 2021-07-18 RX ADMIN — AMIODARONE HYDROCHLORIDE 400 MILLIGRAM(S): 400 TABLET ORAL at 07:28

## 2021-07-18 RX ADMIN — HEPARIN SODIUM 5000 UNIT(S): 5000 INJECTION INTRAVENOUS; SUBCUTANEOUS at 13:04

## 2021-07-18 RX ADMIN — POLYETHYLENE GLYCOL 3350 17 GRAM(S): 17 POWDER, FOR SOLUTION ORAL at 13:03

## 2021-07-18 RX ADMIN — INSULIN GLARGINE 15 UNIT(S): 100 INJECTION, SOLUTION SUBCUTANEOUS at 21:53

## 2021-07-18 RX ADMIN — RIVAROXABAN 15 MILLIGRAM(S): KIT at 21:54

## 2021-07-18 RX ADMIN — SENNA PLUS 2 TABLET(S): 8.6 TABLET ORAL at 21:53

## 2021-07-18 RX ADMIN — Medication 4: at 21:53

## 2021-07-18 RX ADMIN — Medication 650 MILLIGRAM(S): at 13:59

## 2021-07-18 RX ADMIN — Medication 650 MILLIGRAM(S): at 14:30

## 2021-07-18 RX ADMIN — OXYCODONE HYDROCHLORIDE 10 MILLIGRAM(S): 5 TABLET ORAL at 18:06

## 2021-07-18 RX ADMIN — HEPARIN SODIUM 5000 UNIT(S): 5000 INJECTION INTRAVENOUS; SUBCUTANEOUS at 06:00

## 2021-07-18 RX ADMIN — AMIODARONE HYDROCHLORIDE 400 MILLIGRAM(S): 400 TABLET ORAL at 13:03

## 2021-07-18 RX ADMIN — ATORVASTATIN CALCIUM 40 MILLIGRAM(S): 80 TABLET, FILM COATED ORAL at 21:53

## 2021-07-18 RX ADMIN — Medication 2: at 07:29

## 2021-07-18 RX ADMIN — CHLORHEXIDINE GLUCONATE 1 APPLICATION(S): 213 SOLUTION TOPICAL at 06:00

## 2021-07-18 RX ADMIN — Medication 6 UNIT(S): at 07:30

## 2021-07-18 RX ADMIN — Medication 6 UNIT(S): at 13:01

## 2021-07-18 RX ADMIN — GABAPENTIN 300 MILLIGRAM(S): 400 CAPSULE ORAL at 18:06

## 2021-07-18 RX ADMIN — Medication 125 MILLILITER(S): at 17:17

## 2021-07-18 RX ADMIN — PANTOPRAZOLE SODIUM 40 MILLIGRAM(S): 20 TABLET, DELAYED RELEASE ORAL at 07:29

## 2021-07-18 RX ADMIN — Medication 81 MILLIGRAM(S): at 13:04

## 2021-07-18 RX ADMIN — GABAPENTIN 300 MILLIGRAM(S): 400 CAPSULE ORAL at 07:28

## 2021-07-18 RX ADMIN — Medication 125 MILLILITER(S): at 18:07

## 2021-07-18 RX ADMIN — Medication 2: at 13:02

## 2021-07-18 RX ADMIN — OXYCODONE HYDROCHLORIDE 10 MILLIGRAM(S): 5 TABLET ORAL at 18:36

## 2021-07-18 RX ADMIN — Medication 6 UNIT(S): at 18:06

## 2021-07-18 NOTE — PROGRESS NOTE ADULT - SUBJECTIVE AND OBJECTIVE BOX
CTICU  CRITICAL  CARE  attending     Hand off received 					   Pertinent clinical, laboratory, radiographic, hemodynamic, echocardiographic, respiratory data, microbiologic data and chart were reviewed and analyzed frequently throughout the course of the day and night  Patient seen and examined with CTS/ SH attending at bedside  Pt is a 75y , Male, HEALTH ISSUES - PROBLEM Dx:      , FAMILY HISTORY:  No pertinent family history in first degree relatives    PAST MEDICAL & SURGICAL HISTORY:  Hypertension    Hyperlipidemia    Afib    Diabetes mellitus    Stage 3 chronic kidney disease    H/O bacterial endocarditis    Severe aortic insufficiency    Moderate mitral regurgitation    S/P rotator cuff repair    S/P tonsillectomy      Patient is a 75y old  Male who presents with a chief complaint of Cardiac cath (17 Jul 2021 13:45)      14 system review limited by mentation and multiorgan morbidity     Vital signs, hemodynamic and respiratory parameters were reviewed from the bedside nursing flowsheet.  ICU Vital Signs Last 24 Hrs  T(C): 36.2 (17 Jul 2021 22:40), Max: 36.4 (17 Jul 2021 01:21)  T(F): 97.2 (17 Jul 2021 22:40), Max: 97.6 (17 Jul 2021 01:21)  HR: 73 (17 Jul 2021 23:00) (68 - 106)  BP: 111/76 (17 Jul 2021 23:00) (103/58 - 143/65)  BP(mean): 89 (17 Jul 2021 23:00) (70 - 94)  ABP: 125/55 (17 Jul 2021 09:00) (125/55 - 155/61)  ABP(mean): 74 (17 Jul 2021 09:00) (74 - 89)  RR: 20 (17 Jul 2021 22:00) (16 - 20)  SpO2: 95% (17 Jul 2021 23:00) (88% - 97%)    Adult Advanced Hemodynamics Last 24 Hrs  CVP(mm Hg): --  CVP(cm H2O): --  CO: --  CI: --  PA: --  PA(mean): --  PCWP: --  SVR: --  SVRI: --  PVR: --  PVRI: --, ABG - ( 17 Jul 2021 02:37 )  pH, Arterial: 7.39  pH, Blood: x     /  pCO2: 44    /  pO2: 78    / HCO3: 27    / Base Excess: 1.2   /  SaO2: 100.0               Intake and output was reviewed and the fluid balance was calculated  Daily     Daily   I&O's Summary    16 Jul 2021 07:01  -  17 Jul 2021 07:00  --------------------------------------------------------  IN: 1806.2 mL / OUT: 1720 mL / NET: 86.2 mL    17 Jul 2021 07:01  -  18 Jul 2021 00:04  --------------------------------------------------------  IN: 130 mL / OUT: 680 mL / NET: -550 mL        All lines and drain sites were assessed  Glycemic trend was reviewedCAPILLARY BLOOD GLUCOSE      POCT Blood Glucose.: 221 mg/dL (17 Jul 2021 20:55)    No acute change in focality  Auscultation of the chest reveals equal bs  Abdomen is soft  Extremities are warm and well perfused  Wounds appear clean and unremarkable  Antibiotics are periop    labs  CBC Full  -  ( 17 Jul 2021 21:26 )  WBC Count : 9.20 K/uL  RBC Count : 2.89 M/uL  Hemoglobin : 8.6 g/dL  Hematocrit : 25.0 %  Platelet Count - Automated : 134 K/uL  Mean Cell Volume : 86.5 fl  Mean Cell Hemoglobin : 29.8 pg  Mean Cell Hemoglobin Concentration : 34.4 gm/dL  Auto Neutrophil # : x  Auto Lymphocyte # : x  Auto Monocyte # : x  Auto Eosinophil # : x  Auto Basophil # : x  Auto Neutrophil % : x  Auto Lymphocyte % : x  Auto Monocyte % : x  Auto Eosinophil % : x  Auto Basophil % : x    07-17    127<L>  |  93<L>  |  55<H>  ----------------------------<  212<H>  3.9   |  22  |  2.48<H>    Ca    8.2<L>      17 Jul 2021 21:26  Phos  3.9     07-17  Mg     2.3     07-17    TPro  6.0  /  Alb  3.7  /  TBili  0.8  /  DBili  x   /  AST  68<H>  /  ALT  41  /  AlkPhos  51  07-17    PT/INR - ( 17 Jul 2021 21:26 )   PT: 14.3 sec;   INR: 1.20          PTT - ( 17 Jul 2021 21:26 )  PTT:26.8 sec  The current medications were reviewed   MEDICATIONS  (STANDING):  albumin human  5% IVPB 250 milliLiter(s) IV Intermittent once  albumin human  5% IVPB 250 milliLiter(s) IV Intermittent every 30 minutes  albumin human  5% IVPB 250 milliLiter(s) IV Intermittent every 30 minutes  aMIOdarone    Tablet 400 milliGRAM(s) Oral every 8 hours  aMIOdarone    Tablet   Oral   aspirin enteric coated 81 milliGRAM(s) Oral daily  atorvastatin 40 milliGRAM(s) Oral at bedtime  chlorhexidine 2% Cloths 1 Application(s) Topical <User Schedule>  dextrose 40% Gel 15 Gram(s) Oral once  dextrose 5%. 1000 milliLiter(s) (50 mL/Hr) IV Continuous <Continuous>  dextrose 5%. 1000 milliLiter(s) (100 mL/Hr) IV Continuous <Continuous>  dextrose 50% Injectable 25 Gram(s) IV Push once  dextrose 50% Injectable 12.5 Gram(s) IV Push once  dextrose 50% Injectable 25 Gram(s) IV Push once  gabapentin 300 milliGRAM(s) Oral every 12 hours  glucagon  Injectable 1 milliGRAM(s) IntraMuscular once  heparin   Injectable 5000 Unit(s) SubCutaneous every 8 hours  insulin glargine Injectable (LANTUS) 15 Unit(s) SubCutaneous at bedtime  insulin lispro (ADMELOG) corrective regimen sliding scale   SubCutaneous Before meals and at bedtime  insulin lispro Injectable (ADMELOG) 6 Unit(s) SubCutaneous three times a day before meals  pantoprazole    Tablet 40 milliGRAM(s) Oral before breakfast  polyethylene glycol 3350 17 Gram(s) Oral daily  senna 2 Tablet(s) Oral at bedtime  sodium chloride 0.9%. 1000 milliLiter(s) (10 mL/Hr) IV Continuous <Continuous>    MEDICATIONS  (PRN):  acetaminophen   Tablet .. 650 milliGRAM(s) Oral every 6 hours PRN Mild Pain (1 - 3)  oxyCODONE    IR 5 milliGRAM(s) Oral every 4 hours PRN Moderate Pain (4 - 6)  oxyCODONE    IR 10 milliGRAM(s) Oral every 6 hours PRN Severe Pain (7 - 10)       PROBLEM LIST/ ASSESSMENT:  HEALTH ISSUES - PROBLEM Dx:      ,   Patient is a 75y old  Male who presents with a chief complaint of Cardiac cath (17 Jul 2021 13:45)     s/p cardiac surgery    cpap o/n, creat bumping up, 1 prbc given hb 8.6 now, UO good            My plan includes :  close hemodynamic, ventilatory and drain monitoring and management per post op routine    Monitor for arrhythmias and monitor parameters for organ perfusion  beta blockade not administered due to hemodynamic instability and bradycardia  monitor neurologic status  Head of the bed should remain elevated to 45 deg .   chest PT and IS will be encouraged  monitor adequacy of oxygenation and ventilation and attempt to wean oxygen  antibiotic regimen will be tailored to the clinical, laboratory and microbiologic data  Nutritional goals will be met using po eventually , ensure adequate caloric intake and montior the same  Stress ulcer and VTE prophylaxis will be achieved    Glycemic control is satisfactory  Electrolytes have been repleted as necessary and wound care has been carried out. Pain control has been achieved.   agressive physical therapy and early mobility and ambulation goals will be met   The family was updated about the course and plan  CRITICAL CARE TIME personally provided by me  in evaluation and management, reassessments, review and interpretation of labs and x-rays, ventilator and hemodynamic management, formulating a plan and coordinating care: ___60____ MIN.  Time does not include procedural time. Time spent was non routine post-operarive caRE and included multiple and repeated evaluations at the bedside  CTICU ATTENDING     					    Jonatan Ho MD

## 2021-07-18 NOTE — PROCEDURE NOTE - NSPROCDETAILS_GEN_ALL_CORE
Seldinger technique/dressing applied/secured in place
Seldinger technique/dressing applied/secured in place

## 2021-07-18 NOTE — PROGRESS NOTE ADULT - SUBJECTIVE AND OBJECTIVE BOX
CTICU  CRITICAL  CARE  attending     Hand off received 					   Pertinent clinical, laboratory, radiographic, hemodynamic, echocardiographic, respiratory data, microbiologic data and chart were reviewed and analyzed frequently throughout the course of the day and night        75 years old male with DM, HTN, HLD, Afib (on Xarelto, last dose 7/10/21), CKD III, history of bacterial endocarditis 2015 treated with IV Abx, H/O moderate MR and severe AI.  He is very active, he is an avid bike rider with minimal symptoms.  He denies fever, chills, cough, CP, palpitations, SOB, PND/orthopnea, abdominal pain, N/V/D, dizziness, syncope.   Recent ECHO: Normal EF, moderate to severe LV dilatation, severe AI. Bicuspid AV, moderate MR.   In light of pt's risk factors and known valvular disease pt is referred to [facility] for AVR  Cardiac cath: No CAD.    S/P AVR  S/P mitral valve repair.    Postoperative  course complicated by atrial fibrillation, bilateral pleural effuions.        FAMILY HISTORY:  No pertinent family history in first degree relatives    PAST MEDICAL & SURGICAL HISTORY:  Hypertension  Hyperlipidemia  Afib  Diabetes mellitus  Stage 3 chronic kidney disease  H/O bacterial endocarditis  Severe aortic insufficiency  Moderate mitral regurgitation  S/P rotator cuff repair  S/P tonsillectomy        14 system review was unremarkable    Vital signs, hemodynamic and respiratory parameters were reviewed from the bedside nursing flow sheet.  ICU Vital Signs Last 24 Hrs  T(C): 36.3 (18 Jul 2021 09:15), Max: 36.4 (17 Jul 2021 14:45)  T(F): 97.4 (18 Jul 2021 09:15), Max: 97.6 (17 Jul 2021 14:45)  HR: 78 (18 Jul 2021 10:00) (71 - 106)  BP: 104/66 (18 Jul 2021 10:00) (103/58 - 144/68)  BP(mean): 79 (18 Jul 2021 10:00) (70 - 98)  ABP: --  ABP(mean): --  RR: 16 (18 Jul 2021 10:00) (14 - 20)  SpO2: 94% (18 Jul 2021 10:00) (88% - 98%)    Adult Advanced Hemodynamics Last 24 Hrs  CVP(mm Hg): --  CVP(cm H2O): --  CO: --  CI: --  PA: --  PA(mean): --  PCWP: --  SVR: --  SVRI: --  PVR: --  PVRI: --, ABG - ( 17 Jul 2021 02:37 )  pH, Arterial: 7.39  pH, Blood: x     /  pCO2: 44    /  pO2: 78    / HCO3: 27    / Base Excess: 1.2   /  SaO2: 100.0               Intake and output was reviewed and the fluid balance was calculated  Daily     Daily   I&O's Summary    17 Jul 2021 07:01  -  18 Jul 2021 07:00  --------------------------------------------------------  IN: 130 mL / OUT: 680 mL / NET: -550 mL    18 Jul 2021 07:01  -  18 Jul 2021 12:26  --------------------------------------------------------  IN: 0 mL / OUT: 250 mL / NET: -250 mL            Neuro: No focal motor deficit.  Neck: No JVD.  CVS: S1, S2, No S3.  Lungs: Diminished air entry bilerally.  Abd: Soft. No tenderness. + Bowel sounds.  Vascular: + DP/PT.  Extremities: No edema.  Lymphatic: Normal.  Skin: No abnormalities.      labs  CBC Full  -  ( 18 Jul 2021 05:19 )  WBC Count : 10.18 K/uL  RBC Count : 2.88 M/uL  Hemoglobin : 8.9 g/dL  Hematocrit : 25.4 %  Platelet Count - Automated : 148 K/uL  Mean Cell Volume : 88.2 fl  Mean Cell Hemoglobin : 30.9 pg  Mean Cell Hemoglobin Concentration : 35.0 gm/dL  Auto Neutrophil # : x  Auto Lymphocyte # : x  Auto Monocyte # : x  Auto Eosinophil # : x  Auto Basophil # : x  Auto Neutrophil % : x  Auto Lymphocyte % : x  Auto Monocyte % : x  Auto Eosinophil % : x  Auto Basophil % : x    07-18    134<L>  |  96  |  60<H>  ----------------------------<  159<H>  4.1   |  23  |  2.29<H>    Ca    8.3<L>      18 Jul 2021 05:19  Phos  4.8     07-18  Mg     2.5     07-18    TPro  6.0  /  Alb  3.7  /  TBili  0.8  /  DBili  x   /  AST  68<H>  /  ALT  41  /  AlkPhos  51  07-17    PT/INR - ( 18 Jul 2021 05:19 )   PT: 13.3 sec;   INR: 1.11          PTT - ( 18 Jul 2021 05:19 )  PTT:27.1 sec  The current medications were reviewed   MEDICATIONS  (STANDING):  albumin human  5% IVPB 250 milliLiter(s) IV Intermittent once  albumin human  5% IVPB 250 milliLiter(s) IV Intermittent every 30 minutes  albumin human  5% IVPB 250 milliLiter(s) IV Intermittent every 30 minutes  aMIOdarone    Tablet 400 milliGRAM(s) Oral every 8 hours  aMIOdarone    Tablet   Oral   aspirin enteric coated 81 milliGRAM(s) Oral daily  atorvastatin 40 milliGRAM(s) Oral at bedtime  chlorhexidine 2% Cloths 1 Application(s) Topical <User Schedule>  dextrose 40% Gel 15 Gram(s) Oral once  dextrose 5%. 1000 milliLiter(s) (100 mL/Hr) IV Continuous <Continuous>  dextrose 5%. 1000 milliLiter(s) (50 mL/Hr) IV Continuous <Continuous>  dextrose 50% Injectable 25 Gram(s) IV Push once  dextrose 50% Injectable 12.5 Gram(s) IV Push once  dextrose 50% Injectable 25 Gram(s) IV Push once  gabapentin 300 milliGRAM(s) Oral every 12 hours  glucagon  Injectable 1 milliGRAM(s) IntraMuscular once  heparin   Injectable 5000 Unit(s) SubCutaneous every 8 hours  insulin glargine Injectable (LANTUS) 15 Unit(s) SubCutaneous at bedtime  insulin lispro (ADMELOG) corrective regimen sliding scale   SubCutaneous Before meals and at bedtime  insulin lispro Injectable (ADMELOG) 6 Unit(s) SubCutaneous three times a day before meals  pantoprazole    Tablet 40 milliGRAM(s) Oral before breakfast  polyethylene glycol 3350 17 Gram(s) Oral daily  senna 2 Tablet(s) Oral at bedtime  sodium chloride 0.9%. 1000 milliLiter(s) (10 mL/Hr) IV Continuous <Continuous>    MEDICATIONS  (PRN):  acetaminophen   Tablet .. 650 milliGRAM(s) Oral every 6 hours PRN Mild Pain (1 - 3)  oxyCODONE    IR 5 milliGRAM(s) Oral every 4 hours PRN Moderate Pain (4 - 6)  oxyCODONE    IR 10 milliGRAM(s) Oral every 6 hours PRN Severe Pain (7 - 10)          75 years old male with severe AI and moderate MR.  S/P AVR  S/P MV repair.  S/P  maze  Renal Failure  Atrial Fibrillation.  Bilateral Pleural Effusions. S/P drainage of pleural effusions. (1200 cc on the right side. 700 cc on the left side).  Hemodynamically stable.  Good oxygenation.  Fair urine out put.        My plan includes :  Gentle Diuresis.  Statin and Betablocker.  Close hemodynamic, ventilatory and drain monitoring and management  Monitor for arrhythmias and monitor parameters for organ perfusion  Monitor neurologic status  Monitor renal function.  Head of the bed should remain elevated to 45 deg .   Chest PT and IS will be encouraged  Monitor adequacy of oxygenation and ventilation and attempt to wean oxygen  Nutritional goals will be met using po eventually , ensure adequate caloric intake and monitor the same  Stress ulcer and VTE prophylaxis will be achieved    OPTIMIZE Glycemic control.   Electrolytes have been repleted as necessary and wound care has been carried out. Pain control has been achieved.   Aggressive physical therapy and early mobility and ambulation goals will be met   The family was updated about the course and plan  CRITICAL CARE TIME SPENT in evaluation and management, reassessments, review and interpretation of labs and x-rays, ventilator and hemodynamic management, formulating a plan and coordinating care: ___90____ MIN.  Time does not include procedural time.  CTICU ATTENDING     					    Lemuel Wright MD

## 2021-07-19 LAB
ANION GAP SERPL CALC-SCNC: 11 MMOL/L — SIGNIFICANT CHANGE UP (ref 5–17)
ANION GAP SERPL CALC-SCNC: 11 MMOL/L — SIGNIFICANT CHANGE UP (ref 5–17)
APTT BLD: 34.3 SEC — SIGNIFICANT CHANGE UP (ref 27.5–35.5)
BUN SERPL-MCNC: 50 MG/DL — HIGH (ref 7–23)
BUN SERPL-MCNC: 57 MG/DL — HIGH (ref 7–23)
CALCIUM SERPL-MCNC: 8.3 MG/DL — LOW (ref 8.4–10.5)
CALCIUM SERPL-MCNC: 8.6 MG/DL — SIGNIFICANT CHANGE UP (ref 8.4–10.5)
CHLORIDE SERPL-SCNC: 95 MMOL/L — LOW (ref 96–108)
CHLORIDE SERPL-SCNC: 95 MMOL/L — LOW (ref 96–108)
CO2 SERPL-SCNC: 25 MMOL/L — SIGNIFICANT CHANGE UP (ref 22–31)
CO2 SERPL-SCNC: 25 MMOL/L — SIGNIFICANT CHANGE UP (ref 22–31)
CREAT SERPL-MCNC: 1.96 MG/DL — HIGH (ref 0.5–1.3)
CREAT SERPL-MCNC: 2.26 MG/DL — HIGH (ref 0.5–1.3)
GLUCOSE BLDC GLUCOMTR-MCNC: 104 MG/DL — HIGH (ref 70–99)
GLUCOSE BLDC GLUCOMTR-MCNC: 112 MG/DL — HIGH (ref 70–99)
GLUCOSE BLDC GLUCOMTR-MCNC: 145 MG/DL — HIGH (ref 70–99)
GLUCOSE BLDC GLUCOMTR-MCNC: 302 MG/DL — HIGH (ref 70–99)
GLUCOSE SERPL-MCNC: 124 MG/DL — HIGH (ref 70–99)
GLUCOSE SERPL-MCNC: 130 MG/DL — HIGH (ref 70–99)
HCT VFR BLD CALC: 24.6 % — LOW (ref 39–50)
HCT VFR BLD CALC: 28 % — LOW (ref 39–50)
HGB BLD-MCNC: 8.4 G/DL — LOW (ref 13–17)
HGB BLD-MCNC: 9.5 G/DL — LOW (ref 13–17)
INR BLD: 2.15 — HIGH (ref 0.88–1.16)
MAGNESIUM SERPL-MCNC: 2.4 MG/DL — SIGNIFICANT CHANGE UP (ref 1.6–2.6)
MAGNESIUM SERPL-MCNC: 2.4 MG/DL — SIGNIFICANT CHANGE UP (ref 1.6–2.6)
MCHC RBC-ENTMCNC: 30 PG — SIGNIFICANT CHANGE UP (ref 27–34)
MCHC RBC-ENTMCNC: 30.2 PG — SIGNIFICANT CHANGE UP (ref 27–34)
MCHC RBC-ENTMCNC: 33.9 GM/DL — SIGNIFICANT CHANGE UP (ref 32–36)
MCHC RBC-ENTMCNC: 34.1 GM/DL — SIGNIFICANT CHANGE UP (ref 32–36)
MCV RBC AUTO: 88.3 FL — SIGNIFICANT CHANGE UP (ref 80–100)
MCV RBC AUTO: 88.5 FL — SIGNIFICANT CHANGE UP (ref 80–100)
NRBC # BLD: 0 /100 WBCS — SIGNIFICANT CHANGE UP (ref 0–0)
NRBC # BLD: 0 /100 WBCS — SIGNIFICANT CHANGE UP (ref 0–0)
PHOSPHATE SERPL-MCNC: 4.5 MG/DL — SIGNIFICANT CHANGE UP (ref 2.5–4.5)
PLATELET # BLD AUTO: 162 K/UL — SIGNIFICANT CHANGE UP (ref 150–400)
PLATELET # BLD AUTO: 170 K/UL — SIGNIFICANT CHANGE UP (ref 150–400)
POTASSIUM SERPL-MCNC: 4.2 MMOL/L — SIGNIFICANT CHANGE UP (ref 3.5–5.3)
POTASSIUM SERPL-MCNC: 4.3 MMOL/L — SIGNIFICANT CHANGE UP (ref 3.5–5.3)
POTASSIUM SERPL-SCNC: 4.2 MMOL/L — SIGNIFICANT CHANGE UP (ref 3.5–5.3)
POTASSIUM SERPL-SCNC: 4.3 MMOL/L — SIGNIFICANT CHANGE UP (ref 3.5–5.3)
PROTHROM AB SERPL-ACNC: 24.9 SEC — HIGH (ref 10.6–13.6)
RBC # BLD: 2.78 M/UL — LOW (ref 4.2–5.8)
RBC # BLD: 3.17 M/UL — LOW (ref 4.2–5.8)
RBC # FLD: 12.8 % — SIGNIFICANT CHANGE UP (ref 10.3–14.5)
RBC # FLD: 12.9 % — SIGNIFICANT CHANGE UP (ref 10.3–14.5)
SODIUM SERPL-SCNC: 131 MMOL/L — LOW (ref 135–145)
SODIUM SERPL-SCNC: 131 MMOL/L — LOW (ref 135–145)
WBC # BLD: 8.83 K/UL — SIGNIFICANT CHANGE UP (ref 3.8–10.5)
WBC # BLD: 9.2 K/UL — SIGNIFICANT CHANGE UP (ref 3.8–10.5)
WBC # FLD AUTO: 8.83 K/UL — SIGNIFICANT CHANGE UP (ref 3.8–10.5)
WBC # FLD AUTO: 9.2 K/UL — SIGNIFICANT CHANGE UP (ref 3.8–10.5)

## 2021-07-19 PROCEDURE — 71045 X-RAY EXAM CHEST 1 VIEW: CPT | Mod: 26

## 2021-07-19 PROCEDURE — 99232 SBSQ HOSP IP/OBS MODERATE 35: CPT

## 2021-07-19 RX ORDER — SODIUM CHLORIDE 9 MG/ML
3 INJECTION INTRAMUSCULAR; INTRAVENOUS; SUBCUTANEOUS EVERY 8 HOURS
Refills: 0 | Status: DISCONTINUED | OUTPATIENT
Start: 2021-07-19 | End: 2021-07-21

## 2021-07-19 RX ADMIN — GABAPENTIN 300 MILLIGRAM(S): 400 CAPSULE ORAL at 17:16

## 2021-07-19 RX ADMIN — RIVAROXABAN 15 MILLIGRAM(S): KIT at 17:16

## 2021-07-19 RX ADMIN — CHLORHEXIDINE GLUCONATE 1 APPLICATION(S): 213 SOLUTION TOPICAL at 06:28

## 2021-07-19 RX ADMIN — OXYCODONE HYDROCHLORIDE 10 MILLIGRAM(S): 5 TABLET ORAL at 19:39

## 2021-07-19 RX ADMIN — PANTOPRAZOLE SODIUM 40 MILLIGRAM(S): 20 TABLET, DELAYED RELEASE ORAL at 06:28

## 2021-07-19 RX ADMIN — OXYCODONE HYDROCHLORIDE 10 MILLIGRAM(S): 5 TABLET ORAL at 18:39

## 2021-07-19 RX ADMIN — SODIUM CHLORIDE 3 MILLILITER(S): 9 INJECTION INTRAMUSCULAR; INTRAVENOUS; SUBCUTANEOUS at 22:33

## 2021-07-19 RX ADMIN — SENNA PLUS 2 TABLET(S): 8.6 TABLET ORAL at 22:32

## 2021-07-19 RX ADMIN — ATORVASTATIN CALCIUM 40 MILLIGRAM(S): 80 TABLET, FILM COATED ORAL at 22:32

## 2021-07-19 RX ADMIN — Medication 6 UNIT(S): at 12:05

## 2021-07-19 RX ADMIN — Medication 8: at 17:26

## 2021-07-19 RX ADMIN — INSULIN GLARGINE 15 UNIT(S): 100 INJECTION, SOLUTION SUBCUTANEOUS at 22:32

## 2021-07-19 RX ADMIN — Medication 6 UNIT(S): at 17:26

## 2021-07-19 RX ADMIN — GABAPENTIN 300 MILLIGRAM(S): 400 CAPSULE ORAL at 06:28

## 2021-07-19 RX ADMIN — Medication 6 UNIT(S): at 07:11

## 2021-07-19 RX ADMIN — POLYETHYLENE GLYCOL 3350 17 GRAM(S): 17 POWDER, FOR SOLUTION ORAL at 12:00

## 2021-07-19 RX ADMIN — SODIUM CHLORIDE 3 MILLILITER(S): 9 INJECTION INTRAMUSCULAR; INTRAVENOUS; SUBCUTANEOUS at 14:20

## 2021-07-19 RX ADMIN — Medication 81 MILLIGRAM(S): at 12:00

## 2021-07-19 NOTE — PROGRESS NOTE ADULT - SUBJECTIVE AND OBJECTIVE BOX
Transfer from      Operation / Date: 7/13 AVR/ MV repair, cryoMaze, ANGELO clip.  EF NL       SUBJECTIVE ASSESSMENT:  75y Male seen and examined. Patient feels well, he ambualted from unit, using IS pulling 1250cc, tolerating PO Diet, + BM today. Denies fever, chest pain, palpitations, SOB, abdominal pain, n/v.         Vital Signs Last 24 Hrs  T(C): 36.2 (19 Jul 2021 09:32), Max: 36.2 (18 Jul 2021 17:16)  T(F): 97.2 (19 Jul 2021 09:32), Max: 97.2 (18 Jul 2021 17:16)  HR: 110 (19 Jul 2021 13:00) (69 - 110)  BP: 112/58 (19 Jul 2021 13:00) (105/60 - 162/74)  BP(mean): 78 (19 Jul 2021 13:00) (75 - 113)  RR: 17 (19 Jul 2021 13:00) (16 - 22)  SpO2: 97% (19 Jul 2021 13:00) (87% - 100%)  I&O's Detail    18 Jul 2021 07:01  -  19 Jul 2021 07:00  --------------------------------------------------------  IN:    Albumin 5%  - 250 mL: 500 mL    IV PiggyBack: 250 mL  Total IN: 750 mL    OUT:    Chest Tube (mL): 625 mL    Chest Tube (mL): 1175 mL    Voided (mL): 1600 mL  Total OUT: 3400 mL    Total NET: -2650 mL      19 Jul 2021 07:01  -  19 Jul 2021 14:11  --------------------------------------------------------  IN:    PRBCs (Packed Red Blood Cells): 300 mL  Total IN: 300 mL    OUT:    Voided (mL): 400 mL  Total OUT: 400 mL    Total NET: -100 mL    CHEST TUBE:  NO  KWAME DRAIN:  No.  EPICARDIAL WIRES: Yes  TIE DOWNS: Yes  PARIKH: No.    PHYSICAL EXAM:    General: Patient lying comfortably in bed, no acute distress     Neurological: Alert and oriented. No focal neurological deficits     Cardiovascular: S1S2, RRR, no murmurs appreciated on exam     Respiratory: Clear to ausculation bilaterally, no wheeze/rhonchi/rales    Gastrointestinal: + BS, soft, non tender, non distended     Extremities: Warm and well perfused. trace b/l LE edema, no calf tenderness     Vascular: palpable peripheral pulses b/l     Incision Sites: MSI: open to air, no signs of infeciton/dehiscence/click.     LABS:                        9.5    9.20  )-----------( 170      ( 19 Jul 2021 13:11 )             28.0       COUMADIN:  YNO    PT/INR - ( 19 Jul 2021 01:32 )   PT: 24.9 sec;   INR: 2.15          PTT - ( 19 Jul 2021 01:32 )  PTT:34.3 sec    07-19    131<L>  |  95<L>  |  50<H>  ----------------------------<  124<H>  4.2   |  25  |  1.96<H>    Ca    8.6      19 Jul 2021 13:11  Phos  4.5     07-19  Mg     2.4     07-19    TPro  6.0  /  Alb  3.7  /  TBili  0.8  /  DBili  x   /  AST  68<H>  /  ALT  41  /  AlkPhos  51  07-17          MEDICATIONS  (STANDING):  aspirin enteric coated 81 milliGRAM(s) Oral daily  atorvastatin 40 milliGRAM(s) Oral at bedtime  dextrose 40% Gel 15 Gram(s) Oral once  dextrose 5%. 1000 milliLiter(s) (50 mL/Hr) IV Continuous <Continuous>  dextrose 5%. 1000 milliLiter(s) (100 mL/Hr) IV Continuous <Continuous>  dextrose 50% Injectable 25 Gram(s) IV Push once  dextrose 50% Injectable 12.5 Gram(s) IV Push once  dextrose 50% Injectable 25 Gram(s) IV Push once  gabapentin 300 milliGRAM(s) Oral every 12 hours  glucagon  Injectable 1 milliGRAM(s) IntraMuscular once  insulin glargine Injectable (LANTUS) 15 Unit(s) SubCutaneous at bedtime  insulin lispro (ADMELOG) corrective regimen sliding scale   SubCutaneous Before meals and at bedtime  insulin lispro Injectable (ADMELOG) 6 Unit(s) SubCutaneous three times a day before meals  pantoprazole    Tablet 40 milliGRAM(s) Oral before breakfast  polyethylene glycol 3350 17 Gram(s) Oral daily  rivaroxaban 15 milliGRAM(s) Oral with dinner  senna 2 Tablet(s) Oral at bedtime  sodium chloride 0.9% lock flush 3 milliLiter(s) IV Push every 8 hours    MEDICATIONS  (PRN):  oxyCODONE    IR 5 milliGRAM(s) Oral every 4 hours PRN Moderate Pain (4 - 6)  oxyCODONE    IR 10 milliGRAM(s) Oral every 6 hours PRN Severe Pain (7 - 10)        RADIOLOGY & ADDITIONAL TESTS:

## 2021-07-19 NOTE — PROGRESS NOTE ADULT - SUBJECTIVE AND OBJECTIVE BOX
EPS Progress Note  CC: severe AI     S: Reports feeling better today. Ready to be stepped down.   Tele: was in sinus 7/18. Back in AF/aflutter with variable conduction this morning around 5-6 AM. VR 100s. Pt is asymptomatic   Getting PRBC transfusion currently.     O: T(C): 36.2 (07-19-21 @ 09:32), Max: 36.4 (07-18-21 @ 13:35)  HR: 100 (07-19-21 @ 09:00) (69 - 103)  BP: 109/55 (07-19-21 @ 09:00) (105/60 - 162/74)  RR: 20 (07-19-21 @ 09:00) (16 - 22)  SpO2: 98% (07-19-21 @ 09:00) (87% - 100%)      PHYSICAL  Constitutional:  NAD        Pulm:  CTA b/l. No wheeze or rale.   Cardiac:   + s1/s2, irregular. Sternal wound intact with clear dressing. Temporary epicardial wires and blakes removed.   GI:  +BS , soft ND/NT  Vascular: No LE edema, pulse 2+  Neuro: AAO x 3. no focal deficit  Skin: Warm. No rash or lesion     LABS:                        8.4    8.83  )-----------( 162      ( 19 Jul 2021 01:32 )             24.6     07-19    131<L>  |  95<L>  |  57<H>  ----------------------------<  130<H>  4.3   |  25  |  2.26<H>    Ca    8.3<L>      19 Jul 2021 01:32  Phos  4.5     07-19  Mg     2.4     07-19    TPro  6.0  /  Alb  3.7  /  TBili  0.8  /  DBili  x   /  AST  68<H>  /  ALT  41  /  AlkPhos  51  07-17    PT/INR - ( 19 Jul 2021 01:32 )   PT: 24.9 sec;   INR: 2.15          PTT - ( 19 Jul 2021 01:32 )  PTT:34.3 sec    MEDICATIONS:  aspirin enteric coated 81 milliGRAM(s) Oral daily  atorvastatin 40 milliGRAM(s) Oral at bedtime  gabapentin 300 milliGRAM(s) Oral every 12 hours  insulin glargine Injectable (LANTUS) 15 Unit(s) SubCutaneous at bedtime  insulin lispro (ADMELOG) corrective regimen sliding scale   SubCutaneous Before meals and at bedtime  insulin lispro Injectable (ADMELOG) 6 Unit(s) SubCutaneous three times a day before meals  oxyCODONE    IR 5 milliGRAM(s) Oral every 4 hours PRN  oxyCODONE    IR 10 milliGRAM(s) Oral every 6 hours PRN  pantoprazole    Tablet 40 milliGRAM(s) Oral before breakfast  polyethylene glycol 3350 17 Gram(s) Oral daily  rivaroxaban 15 milliGRAM(s) Oral with dinner  senna 2 Tablet(s) Oral at bedtime  sodium chloride 0.9% lock flush 3 milliLiter(s) IV Push every 8 hours

## 2021-07-20 LAB
ANION GAP SERPL CALC-SCNC: 14 MMOL/L — SIGNIFICANT CHANGE UP (ref 5–17)
ANISOCYTOSIS BLD QL: SLIGHT — SIGNIFICANT CHANGE UP
BASOPHILS # BLD AUTO: 0 K/UL — SIGNIFICANT CHANGE UP (ref 0–0.2)
BASOPHILS NFR BLD AUTO: 0 % — SIGNIFICANT CHANGE UP (ref 0–2)
BUN SERPL-MCNC: 48 MG/DL — HIGH (ref 7–23)
CALCIUM SERPL-MCNC: 8.2 MG/DL — LOW (ref 8.4–10.5)
CHLORIDE SERPL-SCNC: 100 MMOL/L — SIGNIFICANT CHANGE UP (ref 96–108)
CO2 SERPL-SCNC: 22 MMOL/L — SIGNIFICANT CHANGE UP (ref 22–31)
CREAT SERPL-MCNC: 1.84 MG/DL — HIGH (ref 0.5–1.3)
EOSINOPHIL # BLD AUTO: 0.35 K/UL — SIGNIFICANT CHANGE UP (ref 0–0.5)
EOSINOPHIL NFR BLD AUTO: 3.5 % — SIGNIFICANT CHANGE UP (ref 0–6)
GIANT PLATELETS BLD QL SMEAR: PRESENT — SIGNIFICANT CHANGE UP
GLUCOSE BLDC GLUCOMTR-MCNC: 150 MG/DL — HIGH (ref 70–99)
GLUCOSE BLDC GLUCOMTR-MCNC: 159 MG/DL — HIGH (ref 70–99)
GLUCOSE BLDC GLUCOMTR-MCNC: 186 MG/DL — HIGH (ref 70–99)
GLUCOSE BLDC GLUCOMTR-MCNC: 201 MG/DL — HIGH (ref 70–99)
GLUCOSE SERPL-MCNC: 137 MG/DL — HIGH (ref 70–99)
HCT VFR BLD CALC: 29.3 % — LOW (ref 39–50)
HGB BLD-MCNC: 9.8 G/DL — LOW (ref 13–17)
LYMPHOCYTES # BLD AUTO: 0.26 K/UL — LOW (ref 1–3.3)
LYMPHOCYTES # BLD AUTO: 2.6 % — LOW (ref 13–44)
MAGNESIUM SERPL-MCNC: 2.3 MG/DL — SIGNIFICANT CHANGE UP (ref 1.6–2.6)
MANUAL SMEAR VERIFICATION: SIGNIFICANT CHANGE UP
MCHC RBC-ENTMCNC: 30 PG — SIGNIFICANT CHANGE UP (ref 27–34)
MCHC RBC-ENTMCNC: 33.4 GM/DL — SIGNIFICANT CHANGE UP (ref 32–36)
MCV RBC AUTO: 89.6 FL — SIGNIFICANT CHANGE UP (ref 80–100)
MONOCYTES # BLD AUTO: 0.8 K/UL — SIGNIFICANT CHANGE UP (ref 0–0.9)
MONOCYTES NFR BLD AUTO: 7.9 % — SIGNIFICANT CHANGE UP (ref 2–14)
NEUTROPHILS # BLD AUTO: 8.71 K/UL — HIGH (ref 1.8–7.4)
NEUTROPHILS NFR BLD AUTO: 86 % — HIGH (ref 43–77)
OVALOCYTES BLD QL SMEAR: SLIGHT — SIGNIFICANT CHANGE UP
PLAT MORPH BLD: ABNORMAL
PLATELET # BLD AUTO: 211 K/UL — SIGNIFICANT CHANGE UP (ref 150–400)
POIKILOCYTOSIS BLD QL AUTO: SLIGHT — SIGNIFICANT CHANGE UP
POLYCHROMASIA BLD QL SMEAR: SLIGHT — SIGNIFICANT CHANGE UP
POTASSIUM SERPL-MCNC: 4.4 MMOL/L — SIGNIFICANT CHANGE UP (ref 3.5–5.3)
POTASSIUM SERPL-SCNC: 4.4 MMOL/L — SIGNIFICANT CHANGE UP (ref 3.5–5.3)
RBC # BLD: 3.27 M/UL — LOW (ref 4.2–5.8)
RBC # FLD: 13.3 % — SIGNIFICANT CHANGE UP (ref 10.3–14.5)
RBC BLD AUTO: ABNORMAL
SODIUM SERPL-SCNC: 136 MMOL/L — SIGNIFICANT CHANGE UP (ref 135–145)
WBC # BLD: 10.13 K/UL — SIGNIFICANT CHANGE UP (ref 3.8–10.5)
WBC # FLD AUTO: 10.13 K/UL — SIGNIFICANT CHANGE UP (ref 3.8–10.5)

## 2021-07-20 PROCEDURE — 93306 TTE W/DOPPLER COMPLETE: CPT | Mod: 26

## 2021-07-20 PROCEDURE — 71045 X-RAY EXAM CHEST 1 VIEW: CPT | Mod: 26

## 2021-07-20 RX ORDER — OXYCODONE HYDROCHLORIDE 5 MG/1
10 TABLET ORAL EVERY 6 HOURS
Refills: 0 | Status: DISCONTINUED | OUTPATIENT
Start: 2021-07-20 | End: 2021-07-21

## 2021-07-20 RX ORDER — OXYCODONE HYDROCHLORIDE 5 MG/1
5 TABLET ORAL EVERY 4 HOURS
Refills: 0 | Status: DISCONTINUED | OUTPATIENT
Start: 2021-07-20 | End: 2021-07-21

## 2021-07-20 RX ORDER — METOPROLOL TARTRATE 50 MG
12.5 TABLET ORAL EVERY 12 HOURS
Refills: 0 | Status: DISCONTINUED | OUTPATIENT
Start: 2021-07-20 | End: 2021-07-21

## 2021-07-20 RX ADMIN — Medication 6 UNIT(S): at 12:03

## 2021-07-20 RX ADMIN — Medication 2: at 12:03

## 2021-07-20 RX ADMIN — Medication 81 MILLIGRAM(S): at 12:03

## 2021-07-20 RX ADMIN — GABAPENTIN 300 MILLIGRAM(S): 400 CAPSULE ORAL at 17:48

## 2021-07-20 RX ADMIN — OXYCODONE HYDROCHLORIDE 5 MILLIGRAM(S): 5 TABLET ORAL at 21:17

## 2021-07-20 RX ADMIN — OXYCODONE HYDROCHLORIDE 5 MILLIGRAM(S): 5 TABLET ORAL at 22:26

## 2021-07-20 RX ADMIN — SODIUM CHLORIDE 3 MILLILITER(S): 9 INJECTION INTRAMUSCULAR; INTRAVENOUS; SUBCUTANEOUS at 06:26

## 2021-07-20 RX ADMIN — Medication 2: at 17:47

## 2021-07-20 RX ADMIN — Medication 6 UNIT(S): at 07:15

## 2021-07-20 RX ADMIN — Medication 12.5 MILLIGRAM(S): at 09:01

## 2021-07-20 RX ADMIN — INSULIN GLARGINE 15 UNIT(S): 100 INJECTION, SOLUTION SUBCUTANEOUS at 22:41

## 2021-07-20 RX ADMIN — SODIUM CHLORIDE 3 MILLILITER(S): 9 INJECTION INTRAMUSCULAR; INTRAVENOUS; SUBCUTANEOUS at 14:17

## 2021-07-20 RX ADMIN — SODIUM CHLORIDE 3 MILLILITER(S): 9 INJECTION INTRAMUSCULAR; INTRAVENOUS; SUBCUTANEOUS at 22:26

## 2021-07-20 RX ADMIN — RIVAROXABAN 15 MILLIGRAM(S): KIT at 17:48

## 2021-07-20 RX ADMIN — GABAPENTIN 300 MILLIGRAM(S): 400 CAPSULE ORAL at 06:25

## 2021-07-20 RX ADMIN — Medication 4: at 22:40

## 2021-07-20 RX ADMIN — PANTOPRAZOLE SODIUM 40 MILLIGRAM(S): 20 TABLET, DELAYED RELEASE ORAL at 06:25

## 2021-07-20 RX ADMIN — Medication 6 UNIT(S): at 17:47

## 2021-07-20 RX ADMIN — ATORVASTATIN CALCIUM 40 MILLIGRAM(S): 80 TABLET, FILM COATED ORAL at 21:17

## 2021-07-20 RX ADMIN — Medication 12.5 MILLIGRAM(S): at 21:17

## 2021-07-20 NOTE — PROGRESS NOTE ADULT - SUBJECTIVE AND OBJECTIVE BOX
EPS Progress Note    S: NAD, fells better     MEDICATIONS  (STANDING):  aspirin enteric coated 81 milliGRAM(s) Oral daily  atorvastatin 40 milliGRAM(s) Oral at bedtime  dextrose 40% Gel 15 Gram(s) Oral once  dextrose 5%. 1000 milliLiter(s) (50 mL/Hr) IV Continuous <Continuous>  dextrose 5%. 1000 milliLiter(s) (100 mL/Hr) IV Continuous <Continuous>  dextrose 50% Injectable 25 Gram(s) IV Push once  dextrose 50% Injectable 12.5 Gram(s) IV Push once  dextrose 50% Injectable 25 Gram(s) IV Push once  gabapentin 300 milliGRAM(s) Oral every 12 hours  glucagon  Injectable 1 milliGRAM(s) IntraMuscular once  insulin glargine Injectable (LANTUS) 15 Unit(s) SubCutaneous at bedtime  insulin lispro (ADMELOG) corrective regimen sliding scale   SubCutaneous Before meals and at bedtime  insulin lispro Injectable (ADMELOG) 6 Unit(s) SubCutaneous three times a day before meals  metoprolol tartrate 12.5 milliGRAM(s) Oral every 12 hours  pantoprazole    Tablet 40 milliGRAM(s) Oral before breakfast  polyethylene glycol 3350 17 Gram(s) Oral daily  rivaroxaban 15 milliGRAM(s) Oral with dinner  senna 2 Tablet(s) Oral at bedtime  sodium chloride 0.9% lock flush 3 milliLiter(s) IV Push every 8 hours            General:  NAD        HEENT:    PERRL, EOMI	  Neck: Supple, - JVD  Cardiovascular:  S1 S2, No JVD, No murmurs   Respiratory: CTA B/L     	  Gastrointestinal:  Soft, Non-tender, + BS	  Skin: No rashes, No ecchymoses, No cyanosis  Extremities: No edema  Psychiatry: A & O x 3         Labs:                                                               9.8    10.13 )-----------( 211      ( 20 Jul 2021 06:55 )             29.3     07-20    136  |  100  |  48<H>  ----------------------------<  137<H>  4.4   |  22  |  1.84<H>    Ca    8.2<L>      20 Jul 2021 06:55  Phos  4.5     07-19  Mg     2.3     07-20      PT/INR - ( 19 Jul 2021 01:32 )   PT: 24.9 sec;   INR: 2.15          PTT - ( 19 Jul 2021 01:32 )  PTT:34.3 sec    Assessment/Plan:  74 yo M with PMHx of HTN, HLD, paroxysmal Afib (on Xarelto 15 mg daily and Sotalol 80 mg bid), DM, CKD III, bacterial endocarditis 2015 treated with IV Abx, moderate MR and severe AI, normal EF on echo, 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   Pt presented in NSR.  Postop on 7/14, pt back to AFIB/typical aflutter. He was started on AMIO bolus/gtt and then PO load 400 mg TID. Asymptomatic.   He self converted on 7/16 afternoon.  He was given Procainamide 1g IV load by team on 7/18 (unclear why; not per EP instruction).    Patient reverted back to A.Fib/Flutter , rate controlled, continue a/c, restart amiodarone po.

## 2021-07-20 NOTE — PROGRESS NOTE ADULT - SUBJECTIVE AND OBJECTIVE BOX
Patient discussed on morning rounds with Dr. Jordan    Operation / Date:  7/13 AVR/ MV repair, cryoMaze, ANGELO clip.  EF NL       SUBJECTIVE ASSESSMENT:  75y Male seen and examined. Patient feels well, ambulating on RA< using IS, pulling 1500cc,tolerating PO Diet, +BMS. denies fever, chest pain, palpitaitons, SOB, abdominal pain, n/v         Vital Signs Last 24 Hrs  T(C): 36.2 (20 Jul 2021 09:08), Max: 36.6 (20 Jul 2021 05:46)  T(F): 97.2 (20 Jul 2021 09:08), Max: 97.9 (20 Jul 2021 05:46)  HR: 90 (20 Jul 2021 08:15) (85 - 112)  BP: 134/57 (20 Jul 2021 08:15) (105/82 - 147/85)  BP(mean): 82 (20 Jul 2021 08:15) (78 - 97)  RR: 31 (20 Jul 2021 08:15) (16 - 31)  SpO2: 96% (20 Jul 2021 08:15) (90% - 98%)  I&O's Detail    19 Jul 2021 07:01  -  20 Jul 2021 07:00  --------------------------------------------------------  IN:    PRBCs (Packed Red Blood Cells): 300 mL  Total IN: 300 mL    OUT:    Voided (mL): 400 mL  Total OUT: 400 mL    Total NET: -100 mL    CHEST TUBE:  NO  KWAME DRAIN:  No.  EPICARDIAL WIRES: Yes  TIE DOWNS: Yes  PARIKH: No.    PHYSICAL EXAM:    General: Patient lying comfortably in bed, no acute distress     Neurological: Alert and oriented. No focal neurological deficits     Cardiovascular: S1S2, RRR, no murmurs appreciated on exam     Respiratory: Clear to ausculation bilaterally, no wheeze/rhonchi/rales    Gastrointestinal: + BS, soft, non tender, non distended     Extremities: Warm and well perfused. trace b/l LE edema, no calf tenderness     Vascular: palpable peripheral pulses b/l     Incision Sites: MSI: open to air, no signs of infeciton/dehiscence/click.     LABS:                        9.8    10.13 )-----------( 211      ( 20 Jul 2021 06:55 )             29.3       COUMADIN:  No    PT/INR - ( 19 Jul 2021 01:32 )   PT: 24.9 sec;   INR: 2.15          PTT - ( 19 Jul 2021 01:32 )  PTT:34.3 sec    07-20    136  |  100  |  48<H>  ----------------------------<  137<H>  4.4   |  22  |  1.84<H>    Ca    8.2<L>      20 Jul 2021 06:55  Phos  4.5     07-19  Mg     2.3     07-20            MEDICATIONS  (STANDING):  aspirin enteric coated 81 milliGRAM(s) Oral daily  atorvastatin 40 milliGRAM(s) Oral at bedtime  dextrose 40% Gel 15 Gram(s) Oral once  dextrose 5%. 1000 milliLiter(s) (50 mL/Hr) IV Continuous <Continuous>  dextrose 5%. 1000 milliLiter(s) (100 mL/Hr) IV Continuous <Continuous>  dextrose 50% Injectable 25 Gram(s) IV Push once  dextrose 50% Injectable 12.5 Gram(s) IV Push once  dextrose 50% Injectable 25 Gram(s) IV Push once  gabapentin 300 milliGRAM(s) Oral every 12 hours  glucagon  Injectable 1 milliGRAM(s) IntraMuscular once  insulin glargine Injectable (LANTUS) 15 Unit(s) SubCutaneous at bedtime  insulin lispro (ADMELOG) corrective regimen sliding scale   SubCutaneous Before meals and at bedtime  insulin lispro Injectable (ADMELOG) 6 Unit(s) SubCutaneous three times a day before meals  metoprolol tartrate 12.5 milliGRAM(s) Oral every 12 hours  pantoprazole    Tablet 40 milliGRAM(s) Oral before breakfast  polyethylene glycol 3350 17 Gram(s) Oral daily  rivaroxaban 15 milliGRAM(s) Oral with dinner  senna 2 Tablet(s) Oral at bedtime  sodium chloride 0.9% lock flush 3 milliLiter(s) IV Push every 8 hours    MEDICATIONS  (PRN):  oxyCODONE    IR 5 milliGRAM(s) Oral every 4 hours PRN Moderate Pain (4 - 6)  oxyCODONE    IR 10 milliGRAM(s) Oral every 6 hours PRN Severe Pain (7 - 10)        RADIOLOGY & ADDITIONAL TESTS:

## 2021-07-21 ENCOUNTER — TRANSCRIPTION ENCOUNTER (OUTPATIENT)
Age: 76
End: 2021-07-21

## 2021-07-21 VITALS — TEMPERATURE: 98 F

## 2021-07-21 PROBLEM — Z86.79 PERSONAL HISTORY OF OTHER DISEASES OF THE CIRCULATORY SYSTEM: Chronic | Status: ACTIVE | Noted: 2021-07-08

## 2021-07-21 PROBLEM — I10 ESSENTIAL (PRIMARY) HYPERTENSION: Chronic | Status: ACTIVE | Noted: 2021-07-08

## 2021-07-21 PROBLEM — E11.9 TYPE 2 DIABETES MELLITUS WITHOUT COMPLICATIONS: Chronic | Status: ACTIVE | Noted: 2021-07-08

## 2021-07-21 PROBLEM — I34.0 NONRHEUMATIC MITRAL (VALVE) INSUFFICIENCY: Chronic | Status: ACTIVE | Noted: 2021-07-08

## 2021-07-21 PROBLEM — I48.91 UNSPECIFIED ATRIAL FIBRILLATION: Chronic | Status: ACTIVE | Noted: 2021-07-08

## 2021-07-21 PROBLEM — I35.1 NONRHEUMATIC AORTIC (VALVE) INSUFFICIENCY: Chronic | Status: ACTIVE | Noted: 2021-07-08

## 2021-07-21 PROBLEM — N18.30 CHRONIC KIDNEY DISEASE, STAGE 3 UNSPECIFIED: Chronic | Status: ACTIVE | Noted: 2021-07-08

## 2021-07-21 PROBLEM — E78.5 HYPERLIPIDEMIA, UNSPECIFIED: Chronic | Status: ACTIVE | Noted: 2021-07-08

## 2021-07-21 LAB
ANION GAP SERPL CALC-SCNC: 11 MMOL/L — SIGNIFICANT CHANGE UP (ref 5–17)
BUN SERPL-MCNC: 44 MG/DL — HIGH (ref 7–23)
CALCIUM SERPL-MCNC: 8.6 MG/DL — SIGNIFICANT CHANGE UP (ref 8.4–10.5)
CHLORIDE SERPL-SCNC: 96 MMOL/L — SIGNIFICANT CHANGE UP (ref 96–108)
CO2 SERPL-SCNC: 26 MMOL/L — SIGNIFICANT CHANGE UP (ref 22–31)
CREAT SERPL-MCNC: 1.69 MG/DL — HIGH (ref 0.5–1.3)
GLUCOSE BLDC GLUCOMTR-MCNC: 127 MG/DL — HIGH (ref 70–99)
GLUCOSE BLDC GLUCOMTR-MCNC: 225 MG/DL — HIGH (ref 70–99)
GLUCOSE SERPL-MCNC: 137 MG/DL — HIGH (ref 70–99)
HCT VFR BLD CALC: 30 % — LOW (ref 39–50)
HGB BLD-MCNC: 9.8 G/DL — LOW (ref 13–17)
MAGNESIUM SERPL-MCNC: 2.2 MG/DL — SIGNIFICANT CHANGE UP (ref 1.6–2.6)
MCHC RBC-ENTMCNC: 29.9 PG — SIGNIFICANT CHANGE UP (ref 27–34)
MCHC RBC-ENTMCNC: 32.7 GM/DL — SIGNIFICANT CHANGE UP (ref 32–36)
MCV RBC AUTO: 91.5 FL — SIGNIFICANT CHANGE UP (ref 80–100)
NRBC # BLD: 0 /100 WBCS — SIGNIFICANT CHANGE UP (ref 0–0)
PLATELET # BLD AUTO: 257 K/UL — SIGNIFICANT CHANGE UP (ref 150–400)
POTASSIUM SERPL-MCNC: 4.3 MMOL/L — SIGNIFICANT CHANGE UP (ref 3.5–5.3)
POTASSIUM SERPL-SCNC: 4.3 MMOL/L — SIGNIFICANT CHANGE UP (ref 3.5–5.3)
RBC # BLD: 3.28 M/UL — LOW (ref 4.2–5.8)
RBC # FLD: 13.7 % — SIGNIFICANT CHANGE UP (ref 10.3–14.5)
SODIUM SERPL-SCNC: 133 MMOL/L — LOW (ref 135–145)
WBC # BLD: 8.37 K/UL — SIGNIFICANT CHANGE UP (ref 3.8–10.5)
WBC # FLD AUTO: 8.37 K/UL — SIGNIFICANT CHANGE UP (ref 3.8–10.5)

## 2021-07-21 PROCEDURE — 80053 COMPREHEN METABOLIC PANEL: CPT

## 2021-07-21 PROCEDURE — 71045 X-RAY EXAM CHEST 1 VIEW: CPT | Mod: 26

## 2021-07-21 PROCEDURE — 97162 PT EVAL MOD COMPLEX 30 MIN: CPT

## 2021-07-21 PROCEDURE — 86850 RBC ANTIBODY SCREEN: CPT

## 2021-07-21 PROCEDURE — 82330 ASSAY OF CALCIUM: CPT

## 2021-07-21 PROCEDURE — 84132 ASSAY OF SERUM POTASSIUM: CPT

## 2021-07-21 PROCEDURE — 71045 X-RAY EXAM CHEST 1 VIEW: CPT

## 2021-07-21 PROCEDURE — 86923 COMPATIBILITY TEST ELECTRIC: CPT

## 2021-07-21 PROCEDURE — 83735 ASSAY OF MAGNESIUM: CPT

## 2021-07-21 PROCEDURE — 84295 ASSAY OF SERUM SODIUM: CPT

## 2021-07-21 PROCEDURE — 86891 AUTOLOGOUS BLOOD OP SALVAGE: CPT

## 2021-07-21 PROCEDURE — 80048 BASIC METABOLIC PNL TOTAL CA: CPT

## 2021-07-21 PROCEDURE — 97161 PT EVAL LOW COMPLEX 20 MIN: CPT

## 2021-07-21 PROCEDURE — 93880 EXTRACRANIAL BILAT STUDY: CPT

## 2021-07-21 PROCEDURE — 80061 LIPID PANEL: CPT

## 2021-07-21 PROCEDURE — 86140 C-REACTIVE PROTEIN: CPT

## 2021-07-21 PROCEDURE — 85027 COMPLETE CBC AUTOMATED: CPT

## 2021-07-21 PROCEDURE — 99239 HOSP IP/OBS DSCHRG MGMT >30: CPT

## 2021-07-21 PROCEDURE — 83036 HEMOGLOBIN GLYCOSYLATED A1C: CPT

## 2021-07-21 PROCEDURE — 94002 VENT MGMT INPAT INIT DAY: CPT

## 2021-07-21 PROCEDURE — 97116 GAIT TRAINING THERAPY: CPT

## 2021-07-21 PROCEDURE — P9016: CPT

## 2021-07-21 PROCEDURE — C1768: CPT

## 2021-07-21 PROCEDURE — 84100 ASSAY OF PHOSPHORUS: CPT

## 2021-07-21 PROCEDURE — 88305 TISSUE EXAM BY PATHOLOGIST: CPT

## 2021-07-21 PROCEDURE — C1887: CPT

## 2021-07-21 PROCEDURE — C1889: CPT

## 2021-07-21 PROCEDURE — 85025 COMPLETE CBC W/AUTO DIFF WBC: CPT

## 2021-07-21 PROCEDURE — P9047: CPT

## 2021-07-21 PROCEDURE — 86901 BLOOD TYPING SEROLOGIC RH(D): CPT

## 2021-07-21 PROCEDURE — C1894: CPT

## 2021-07-21 PROCEDURE — 85730 THROMBOPLASTIN TIME PARTIAL: CPT

## 2021-07-21 PROCEDURE — 36415 COLL VENOUS BLD VENIPUNCTURE: CPT

## 2021-07-21 PROCEDURE — 82553 CREATINE MB FRACTION: CPT

## 2021-07-21 PROCEDURE — 93306 TTE W/DOPPLER COMPLETE: CPT

## 2021-07-21 PROCEDURE — 86900 BLOOD TYPING SEROLOGIC ABO: CPT

## 2021-07-21 PROCEDURE — 83605 ASSAY OF LACTIC ACID: CPT

## 2021-07-21 PROCEDURE — 82962 GLUCOSE BLOOD TEST: CPT

## 2021-07-21 PROCEDURE — 85610 PROTHROMBIN TIME: CPT

## 2021-07-21 PROCEDURE — 93005 ELECTROCARDIOGRAM TRACING: CPT

## 2021-07-21 PROCEDURE — 94150 VITAL CAPACITY TEST: CPT

## 2021-07-21 PROCEDURE — 94660 CPAP INITIATION&MGMT: CPT

## 2021-07-21 PROCEDURE — 36430 TRANSFUSION BLD/BLD COMPNT: CPT

## 2021-07-21 PROCEDURE — 82550 ASSAY OF CK (CPK): CPT

## 2021-07-21 PROCEDURE — P9045: CPT

## 2021-07-21 PROCEDURE — C1769: CPT

## 2021-07-21 PROCEDURE — 82803 BLOOD GASES ANY COMBINATION: CPT

## 2021-07-21 RX ORDER — AMIODARONE HYDROCHLORIDE 400 MG/1
TABLET ORAL
Refills: 0 | Status: DISCONTINUED | OUTPATIENT
Start: 2021-07-21 | End: 2021-07-21

## 2021-07-21 RX ORDER — METFORMIN HYDROCHLORIDE 850 MG/1
1 TABLET ORAL
Qty: 60 | Refills: 0
Start: 2021-07-21 | End: 2021-08-19

## 2021-07-21 RX ORDER — ACETAMINOPHEN 500 MG
2 TABLET ORAL
Qty: 240 | Refills: 0
Start: 2021-07-21 | End: 2021-08-19

## 2021-07-21 RX ORDER — ATORVASTATIN CALCIUM 80 MG/1
1 TABLET, FILM COATED ORAL
Qty: 30 | Refills: 0
Start: 2021-07-21 | End: 2021-08-19

## 2021-07-21 RX ORDER — LISINOPRIL 2.5 MG/1
1 TABLET ORAL
Qty: 0 | Refills: 0 | DISCHARGE

## 2021-07-21 RX ORDER — POLYETHYLENE GLYCOL 3350 17 G/17G
17 POWDER, FOR SOLUTION ORAL
Qty: 119 | Refills: 0
Start: 2021-07-21 | End: 2021-07-27

## 2021-07-21 RX ORDER — FONDAPARINUX SODIUM 2.5 MG/.5ML
1 INJECTION, SOLUTION SUBCUTANEOUS
Qty: 0 | Refills: 0 | DISCHARGE

## 2021-07-21 RX ORDER — FUROSEMIDE 40 MG
1 TABLET ORAL
Qty: 3 | Refills: 0
Start: 2021-07-21 | End: 2021-07-23

## 2021-07-21 RX ORDER — SIMVASTATIN 20 MG/1
1 TABLET, FILM COATED ORAL
Qty: 0 | Refills: 0 | DISCHARGE

## 2021-07-21 RX ORDER — ASPIRIN/CALCIUM CARB/MAGNESIUM 324 MG
1 TABLET ORAL
Qty: 30 | Refills: 0
Start: 2021-07-21 | End: 2021-08-19

## 2021-07-21 RX ORDER — AMIODARONE HYDROCHLORIDE 400 MG/1
200 TABLET ORAL DAILY
Refills: 0 | Status: CANCELLED | OUTPATIENT
Start: 2021-07-25 | End: 2021-07-21

## 2021-07-21 RX ORDER — AMIODARONE HYDROCHLORIDE 400 MG/1
2 TABLET ORAL
Qty: 60 | Refills: 0
Start: 2021-07-21 | End: 2021-08-19

## 2021-07-21 RX ORDER — OXYCODONE HYDROCHLORIDE 5 MG/1
1 TABLET ORAL
Qty: 28 | Refills: 0
Start: 2021-07-21 | End: 2021-07-27

## 2021-07-21 RX ORDER — SENNA PLUS 8.6 MG/1
2 TABLET ORAL
Qty: 60 | Refills: 0
Start: 2021-07-21 | End: 2021-08-19

## 2021-07-21 RX ORDER — GABAPENTIN 400 MG/1
1 CAPSULE ORAL
Qty: 60 | Refills: 0
Start: 2021-07-21 | End: 2021-08-19

## 2021-07-21 RX ORDER — PANTOPRAZOLE SODIUM 20 MG/1
1 TABLET, DELAYED RELEASE ORAL
Qty: 30 | Refills: 0
Start: 2021-07-21 | End: 2021-08-19

## 2021-07-21 RX ORDER — SOTALOL HCL 120 MG
1 TABLET ORAL
Qty: 0 | Refills: 0 | DISCHARGE

## 2021-07-21 RX ORDER — RIVAROXABAN 15 MG-20MG
1 KIT ORAL
Qty: 30 | Refills: 0
Start: 2021-07-21 | End: 2021-08-19

## 2021-07-21 RX ORDER — AMIODARONE HYDROCHLORIDE 400 MG/1
400 TABLET ORAL EVERY 8 HOURS
Refills: 0 | Status: DISCONTINUED | OUTPATIENT
Start: 2021-07-21 | End: 2021-07-21

## 2021-07-21 RX ORDER — POTASSIUM CHLORIDE 20 MEQ
1 PACKET (EA) ORAL
Qty: 3 | Refills: 0
Start: 2021-07-21 | End: 2021-07-23

## 2021-07-21 RX ORDER — METOPROLOL TARTRATE 50 MG
0.5 TABLET ORAL
Qty: 30 | Refills: 0
Start: 2021-07-21 | End: 2021-08-19

## 2021-07-21 RX ORDER — METFORMIN HYDROCHLORIDE 850 MG/1
1 TABLET ORAL
Qty: 0 | Refills: 0 | DISCHARGE

## 2021-07-21 RX ORDER — METOPROLOL TARTRATE 50 MG
25 TABLET ORAL ONCE
Refills: 0 | Status: COMPLETED | OUTPATIENT
Start: 2021-07-21 | End: 2021-07-21

## 2021-07-21 RX ADMIN — Medication 6 UNIT(S): at 09:22

## 2021-07-21 RX ADMIN — SODIUM CHLORIDE 3 MILLILITER(S): 9 INJECTION INTRAMUSCULAR; INTRAVENOUS; SUBCUTANEOUS at 05:03

## 2021-07-21 RX ADMIN — GABAPENTIN 300 MILLIGRAM(S): 400 CAPSULE ORAL at 05:03

## 2021-07-21 RX ADMIN — Medication 12.5 MILLIGRAM(S): at 05:03

## 2021-07-21 RX ADMIN — Medication 6 UNIT(S): at 12:30

## 2021-07-21 RX ADMIN — PANTOPRAZOLE SODIUM 40 MILLIGRAM(S): 20 TABLET, DELAYED RELEASE ORAL at 05:03

## 2021-07-21 RX ADMIN — Medication 25 MILLIGRAM(S): at 01:10

## 2021-07-21 RX ADMIN — Medication 81 MILLIGRAM(S): at 12:31

## 2021-07-21 RX ADMIN — Medication 4: at 12:30

## 2021-07-21 RX ADMIN — AMIODARONE HYDROCHLORIDE 400 MILLIGRAM(S): 400 TABLET ORAL at 09:24

## 2021-07-21 NOTE — DISCHARGE NOTE PROVIDER - PROVIDER TOKENS
PROVIDER:[TOKEN:[[id number]]],PROVIDER:[TOKEN:[[id number]]] PROVIDER:[TOKEN:[[id number]],SCHEDULEDAPPT:[07/28/2021],SCHEDULEDAPPTTIME:[12:15 PM]],PROVIDER:[TOKEN:[[id number]],SCHEDULEDAPPT:[07/26/2021],SCHEDULEDAPPTTIME:[02:00 PM]],PROVIDER:[TOKEN:[[id number]],FOLLOWUP:[2 weeks]]

## 2021-07-21 NOTE — PROGRESS NOTE ADULT - SUBJECTIVE AND OBJECTIVE BOX
Patient discussed on morning rounds with Dr. Chapman    Operation / Date: 7/13 AVR/ MV repair, cryoMaze, ANGELO clip.  EF NL     Surgeon: Adela    Referring Physician: Paul    SUBJECTIVE ASSESSMENT:  75y Male seen and examined at bedside.  Patient with no complaints.  Denies any chest pain, shortness of breath, nausea, vomiting.  +BM.  Able to pull 1500cc on IS.    Hospital Course:  75 year old Male with PMHx of HTN, HLD, Afib (on Xarelto), DM, CKD III, bacterial endocarditis 2015 treated with IV Abx, moderate MR and severe AI who presented to Dr. Chapman for evaluation of valvular disease. Pt reports he is very active, he is an avid bike rider without any symptoms. ECHO 6/1/21: normal EF, moderate to severe LV dilatation, severe AI cannot exclude bicuspid AV, moderate MR. Patient evaluated and deemed a surgical candidate. On 7/13/21 patient underwent  AVR/ MV repair, cryoMaze, ANGELO clip, EF nml. Intraop patient received 1uPRBC, and arrived to CTICU in stable condition, on Epi/Primacor. Patient extubated in short postop period. Epi titrated down to off, primacor weaned, and amiodarone gtt started overnight for Afib w RVR. EPS consulted, Xarelto restarted on POD3, but then was held on POD4 2/2 pleural effusions seen on CXR. POD4 bilateral pigtail pleural catheters placed, with the right draining 1200cc and 700cc on the left. POD5 pigtails removed at bedside, and Xarelto resumed. Patient transferred to stepdown unit.  Patient has been progressing well, ambulating in the halls, voiding spontaneously and tolerating PO. Per Dr. Chapman, patient deemed stable for discharge home today.    Vital Signs Last 24 Hrs  T(C): 36.4 (21 Jul 2021 10:05), Max: 36.9 (20 Jul 2021 13:34)  T(F): 97.5 (21 Jul 2021 10:05), Max: 98.4 (20 Jul 2021 13:34)  HR: 88 (21 Jul 2021 12:15) (82 - 110)  BP: 124/56 (21 Jul 2021 12:15) (117/70 - 168/73)  BP(mean): 81 (21 Jul 2021 12:15) (80 - 105)  RR: 19 (21 Jul 2021 12:15) (18 - 28)  SpO2: 96% (21 Jul 2021 12:15) (93% - 96%)  I&O's Detail    20 Jul 2021 07:01  -  21 Jul 2021 07:00  --------------------------------------------------------  IN:    Oral Fluid: 590 mL  Total IN: 590 mL    OUT:  Total OUT: 0 mL    Total NET: 590 mL          EPICARDIAL WIRES REMOVED: Yes  TIE DOWNS REMOVED: Yes.    PHYSICAL EXAM:    General: Patient lying comfortably in bed, no acute distress     Neurological: Alert and oriented. No focal neurological deficits     Cardiovascular: S1S2, RRR, no murmurs appreciated on exam     Respiratory: Clear to ausculation bilaterally, no wheeze/rhonchi/rales    Gastrointestinal: + BS, soft, non tender, non distended     Extremities: Warm and well perfused. trace b/l LE edema, no calf tenderness     Vascular: palpable peripheral pulses b/l     Incision Sites: MSI: open to air, no signs of infeciton/dehiscence/click.    LABS:                        9.8    8.37  )-----------( 257      ( 21 Jul 2021 06:51 )             30.0       COUMADIN: No.        DOSE:                  INDICATION:                GOAL INR:        07-21    133<L>  |  96  |  44<H>  ----------------------------<  137<H>  4.3   |  26  |  1.69<H>    Ca    8.6      21 Jul 2021 06:51  Mg     2.2     07-21            MEDICATIONS  (STANDING):  aMIOdarone    Tablet 400 milliGRAM(s) Oral every 8 hours  aMIOdarone    Tablet   Oral   aspirin enteric coated 81 milliGRAM(s) Oral daily  atorvastatin 40 milliGRAM(s) Oral at bedtime  dextrose 40% Gel 15 Gram(s) Oral once  dextrose 5%. 1000 milliLiter(s) (50 mL/Hr) IV Continuous <Continuous>  dextrose 5%. 1000 milliLiter(s) (100 mL/Hr) IV Continuous <Continuous>  dextrose 50% Injectable 25 Gram(s) IV Push once  dextrose 50% Injectable 12.5 Gram(s) IV Push once  dextrose 50% Injectable 25 Gram(s) IV Push once  gabapentin 300 milliGRAM(s) Oral every 12 hours  glucagon  Injectable 1 milliGRAM(s) IntraMuscular once  insulin glargine Injectable (LANTUS) 15 Unit(s) SubCutaneous at bedtime  insulin lispro (ADMELOG) corrective regimen sliding scale   SubCutaneous Before meals and at bedtime  insulin lispro Injectable (ADMELOG) 6 Unit(s) SubCutaneous three times a day before meals  metoprolol tartrate 12.5 milliGRAM(s) Oral every 12 hours  pantoprazole    Tablet 40 milliGRAM(s) Oral before breakfast  polyethylene glycol 3350 17 Gram(s) Oral daily  rivaroxaban 15 milliGRAM(s) Oral with dinner  senna 2 Tablet(s) Oral at bedtime  sodium chloride 0.9% lock flush 3 milliLiter(s) IV Push every 8 hours      Discharge CXR:    Discharge ECHO:  < from: TTE Echo Complete w/o Contrast w/ Doppler (07.20.21 @ 14:18) >   1. Moderate symmetric left ventricular hypertrophy.   2. Normal left ventricular size and systolic function.   3. Probably normal right ventricular systolic function.   4. Mildly dilated left atrium.   5. Bioprosthetic valve is seen in the aortic position with apparent normal function, without evidence of prosthetic dysfunction. No evidence of aortic regurgitation.   6. An annuloplasty ring is noted in the mitral position.   7. Pulmonary artery systolic pressure is 34 mmHg.   8. Trivial pericardial effusion.   9. No prior echo is available for comparison.    < end of copied text >

## 2021-07-21 NOTE — PROGRESS NOTE ADULT - PROVIDER SPECIALTY LIST ADULT
CT Surgery
Critical Care
Electrophysiology
Intervent Cardiology
Critical Care
CT Surgery
Critical Care
Electrophysiology

## 2021-07-21 NOTE — PATIENT PROFILE ADULT - HAVE YOU EXPERIENCED VIOLENCE OR A TRAUMATIC EVENT?
[place] ambulatory encounter  URGENT CARE OFFICE VISIT    SUBJECTIVE:  Andi Wright is a 39 year old male who presented requesting evaluation for left thumb injury.  States that he was wearing a glove in using a drill when the glove got caught in the drill bit spinning the thumb around acutely right before presentation to the urgent care.  Immediate onset of discomfort and swelling.  Denies any other injury or complaints.    Review of systems:    A review of systems was performed and findings relevant to these symptoms are included in the HPI.     PAST HISTORIES:    ALLERGIES:   Allergen Reactions   • Prilosec Otc Other (See Comments)     Throat swelling   • Nexium        MEDICATIONS:  Current Outpatient Medications   Medication Sig   • Multiple Vitamins-Minerals (Multivitamin Adult) Chew Tab    • loratadine (CLARITIN) 10 MG tablet Take 10 mg by mouth daily.   • fluticasone (FLONASE) 50 MCG/ACT nasal spray Spray 1 spray in each nostril nightly.   • ibuprofen (MOTRIN) 600 MG tablet Take 1 tablet by mouth every 8 hours as needed for Pain.   • TURMERIC CURCUMIN PO Take by mouth as needed.   • acetaminophen (TYLENOL) 500 MG tablet Take 500 mg by mouth every 6 hours as needed for Pain.   • olopatadine (PATANOL) 0.1 % ophthalmic solution Place 1 drop into both eyes 2 times daily.     No current facility-administered medications for this visit.           I have reviewed the past medical history, family history, social history, medications and allergies listed in the medical record as obtained by my nursing staff and support staff and agree with their documentation    OBJECTIVE:  Physical Exam:    Visit Vitals  /64   Pulse (!) 50   Temp 96.7 °F (35.9 °C) (Temporal)   Resp 16   Ht 6' (1.829 m)   Wt 72.9 kg   SpO2 100%   BMI 21.80 kg/m²        CONSTITUTIONAL: Well-hydrated, well-nourished male who appears to be in no acute distress. Awake, alert and cooperative.  MUSCULOSKELETAL:  Left hand examined with on the thumb  with some swelling and bruising noted to the entire to the MCP.  Discomfort with movement but does have full range of motion.  Good flexion and extension against resistance.  Do not palpate any crepitus strength neurovascular sensation is intact  SKIN: Warm, dry, intact without rash or lesion.  NEUROLOGIC: Alert and oriented x3.  PSYCHIATRIC:  Speech and behavior appropriate. Normal mood and affect.    DIAGNOSTICS:  EXAM: XR HAND 3+ VIEW LEFT     CLINICAL INDICATION: thumb injury twisted with glove around drill bit     COMPARISON: 02/07/2021     FINDINGS:      There is no evidence of fracture, dislocation or other acute bony  abnormality. There is a corticated ossification at the first  interphalangeal joint which may be secondary to old trauma or an accessory  ossification center.              IMPRESSION:     No acute bony abnormality.    Assessment:  1. Sprain of left thumb, unspecified site of digit, initial encounter    2. Injury of left thumb, initial encounter            PLAN:  Orders Placed This Encounter   • WRIST & THUMB SPICA SPLINT   • XR Hand 3+ View Left   • Multiple Vitamins-Minerals (Multivitamin Adult) Chew Tab   • loratadine (CLARITIN) 10 MG tablet           FOLLOW-UP:  Primary care as needed ortho not improved    PATIENT INSTRUCTIONS:  Discussed with patient and his wife that this is a sprain of the left thumb.  Ice elevation Tylenol ibuprofen.  Also fit a thumb spica wrist splint to wear 247 for the next week and then just daily for the week after that.  Would recommend removal 3 to 4 times a day and just do range of motion with the thumb.  If he has any new or worsening symptoms should return for re-evaluation at that time.    The patient was advised to follow up with primary physician or to recheck with the urgent care clinic sooner if symptoms get worse or if new symptoms appear.    The patient indicated understanding of the diagnosis and agreed with the plan of care.     no

## 2021-07-21 NOTE — PROGRESS NOTE ADULT - NSICDXPILOT_GEN_ALL_CORE
[place]

## 2021-07-21 NOTE — DISCHARGE NOTE PROVIDER - NSDCFUADDINST_GEN_ALL_CORE_FT
-Walk daily as tolerated and use your incentive spirometer every hour.  -No driving or strenuous activity/exercise for 6 weeks, or until cleared by your surgeon.  -Gently clean your incisions with anti-bacterial soap and water, pat dry.  You may leave them open to air.  -Call your doctor if you have shortness of breath, chest pain not relieved by pain medication, dizziness, fever >101.5, or increased redness or drainage from incisions.

## 2021-07-21 NOTE — CHART NOTE - NSCHARTNOTESELECT_GEN_ALL_CORE
Nutrition Follow-up/Nutrition Services
[place] and wire removal/Event Note
Chest US/Event Note
Chest US/Event Note

## 2021-07-21 NOTE — CHART NOTE - NSCHARTNOTEFT_GEN_A_CORE
Exam:  US Chest    Procedure Date:    History: 75y Male whose CXR today shows a possible right sided pleural effusion.  Pt is POD #4 from AVR/MVR.       Findings:                    Evaluation of the right side of the thoracic cavity demonstrates                   Medium pleural effusion                  Lung is freely movable with in thoracic cavity    Impression:  Medium right sided pleural effusion (~500cc)  Patient on xarelto, will plan to hold tonight  Rescan tomorrow AM and likely pigtail placement   Restt AC after procedure
Exam:  US Chest    Procedure Date: 7/21/21    History: 75y Male whose CXR today shows a possible Bilateral sided pleural effusion.  Patient is POD #8 from AVR/MV repair, cyromaze, ANGELO clip.       Findings:                    Evaluation of the left side of the thoracic cavity demonstrates                   Small pleural effusion                  Lung is freely movable with in thoracic cavity                    Evaluation of the right side of the thoracic cavity demonstrates                   No pleural effusion                  Lung is freely movable with in thoracic cavity    Impression:  small left sided pleural effusion  No right sided pleural effusion
Heparin gtt held at 2:45pm.  About 3:30pm, pacing wires removed (pulled with minimal resistance).  Then right-most mediastinal charles removed.  At 5:30pm, remaining mediastinal charles drain removed.  Tie down sutures was intertwined with the anchor stitch and was inadvertently cut.    Occlusive dressing applied.  CXR ordered.
Admitting Diagnosis:   Patient is a 75y old  Male who presents with a chief complaint of Cardiac cath (20 Jul 2021 10:11)      PAST MEDICAL & SURGICAL HISTORY:  Hypertension    Hyperlipidemia    Afib    Diabetes mellitus    Stage 3 chronic kidney disease    H/O bacterial endocarditis    Severe aortic insufficiency    Moderate mitral regurgitation    S/P rotator cuff repair    S/P tonsillectomy        Current Nutrition Order: Cst Cho no Snack + DASH/TLC diet        PO Intake: Good (%) [   ]  Fair (50-75%) [  x ] Poor (<25%) [   ]    GI Issues: +BM (7/19), no n/v/c     Pain: denies     Skin Integrity:  MSI   No edema   No pressure ulcers     Labs:   07-20    136  |  100  |  48<H>  ----------------------------<  137<H>  4.4   |  22  |  1.84<H>    Ca    8.2<L>      20 Jul 2021 06:55  Phos  4.5     07-19  Mg     2.3     07-20      CAPILLARY BLOOD GLUCOSE      POCT Blood Glucose.: 159 mg/dL (20 Jul 2021 11:59)  POCT Blood Glucose.: 150 mg/dL (20 Jul 2021 06:29)  POCT Blood Glucose.: 104 mg/dL (19 Jul 2021 21:37)  POCT Blood Glucose.: 302 mg/dL (19 Jul 2021 17:22)      Medications:  MEDICATIONS  (STANDING):  aspirin enteric coated 81 milliGRAM(s) Oral daily  atorvastatin 40 milliGRAM(s) Oral at bedtime  dextrose 40% Gel 15 Gram(s) Oral once  dextrose 5%. 1000 milliLiter(s) (50 mL/Hr) IV Continuous <Continuous>  dextrose 5%. 1000 milliLiter(s) (100 mL/Hr) IV Continuous <Continuous>  dextrose 50% Injectable 25 Gram(s) IV Push once  dextrose 50% Injectable 12.5 Gram(s) IV Push once  dextrose 50% Injectable 25 Gram(s) IV Push once  gabapentin 300 milliGRAM(s) Oral every 12 hours  glucagon  Injectable 1 milliGRAM(s) IntraMuscular once  insulin glargine Injectable (LANTUS) 15 Unit(s) SubCutaneous at bedtime  insulin lispro (ADMELOG) corrective regimen sliding scale   SubCutaneous Before meals and at bedtime  insulin lispro Injectable (ADMELOG) 6 Unit(s) SubCutaneous three times a day before meals  metoprolol tartrate 12.5 milliGRAM(s) Oral every 12 hours  pantoprazole    Tablet 40 milliGRAM(s) Oral before breakfast  polyethylene glycol 3350 17 Gram(s) Oral daily  rivaroxaban 15 milliGRAM(s) Oral with dinner  senna 2 Tablet(s) Oral at bedtime  sodium chloride 0.9% lock flush 3 milliLiter(s) IV Push every 8 hours    MEDICATIONS  (PRN):  oxyCODONE    IR 5 milliGRAM(s) Oral every 4 hours PRN Moderate Pain (4 - 6)  oxyCODONE    IR 10 milliGRAM(s) Oral every 6 hours PRN Severe Pain (7 - 10)      Weight: 82.6kg      Weight Change: no wt changes noted     Nutrition Focused Physical Exam: Completed [   ]  Not Pertinent [ x  ]    Estimated energy needs:   Ideal body weight used for calculations as pt >120% of IBW (124% IBW). Needs estimated for age and adjusted for post-op/wound healing  1665-1998kcal (25-30kcal/kg)   79-93g pro (1.2-1.4g/kg pro)   1998-2331ml (30-35ml/kg)    Subjective:   74 yo/male with PMHx HTN, HLD, Afib, DM, CKD, bacterial endocarditis, now s/p AVR/MR repair/ cryoMAZE, ANGELO occlusion on 7/13. C/b post-op Afib. Pt seen in room, awake, alert, very pleasant, OOB in chair. Wife at bedside. Pt endorsed eating very well at home PTA, no specific dietary restrictions followed but tries to generally eat a "healthy" diet.  Went to OR 7/13 for AVR/MVR, now on 9L. Plan for dispo to home tomorrow or following day. DM/Heart healthy nutrition reviewed and handouts provided on initial assessment, reinforced ed. Encouraged lean protein intake, tolerating diet at this time. Will continue to follow per RD protocol.    Previous Nutrition Diagnosis: Increased nutrient needs r/t increase demand for protein calorie intake AEB post-op healing     Active [  x ]  Resolved [   ]    If resolved, new PES:     Goal: Pt will consistently meet % of daily EER w/good tolerance    Recommendations:  1. Encourage PO intake   2. Monitor lytes and replete prn. POC BG q6hrs   3. Pain and bowel regimens per team   4. Reinforce diet ed    Education: encourage intake through day to meet needs     Risk Level: High [   ] Moderate [ x ] Low [   ]

## 2021-07-21 NOTE — DISCHARGE NOTE PROVIDER - HOSPITAL COURSE
**incomplete note**    75 year old F, PMHx HTN, HLD, Afib (on Xarelto, last dose 7/10/21), DM, CKD III, bacterial endocarditis 2015 treated with IV Abx, moderate MR and severe AI who presented to Dr. Chapman for evaluation of valvular disease. Pt reports he is very active, he is an avid bike rider without any symptoms. ECHO 6/1/21: normal EF, moderate to severe LV dilatation, severe AI cannot exclude bicuspid AV, moderate MR. On 7/13/21 patient underwent  AVR/ MV repair, cryoMaze, ANGELO clip.  EF NL . OR 1u prbc, arrived to ICU on epi/primacor. Extubated POD#0. chronic afib started on amio gtt?, transitioned to PO load. EP consulted,. heparin gtt started POD#2. xarelto restarted POD#3. Xarelto held POD#4 for effusion. Bilateral pigtials placed POD#5 1200cc on R and 700cc on L. removed and AC restarted. POD#5 transfer to . TOday POD5 75 year old Male with PMHx of HTN, HLD, Afib (on Xarelto), DM, CKD III, bacterial endocarditis 2015 treated with IV Abx, moderate MR and severe AI who presented to Dr. Chapman for evaluation of valvular disease. Pt reports he is very active, he is an avid bike rider without any symptoms. ECHO 6/1/21: normal EF, moderate to severe LV dilatation, severe AI cannot exclude bicuspid AV, moderate MR. Patient evaluated and deemed a surgical candidate. On 7/13/21 patient underwent  AVR/ MV repair, cryoMaze, ANGELO clip, EF nml. Intraop patient received 1uPRBC, and arrived to CTICU in stable condition, on Epi/Primacor. Patient extubated in short postop period. Epi titrated down to off, primacor weaned, and amiodarone gtt started overnight for Afib w RVR. EPS consulted, Xarelto restarted on POD3, but then was held on POD4 2/2 pleural effusions seen on CXR. POD4 bilateral pigtail pleural catheters placed, with the right draining 1200cc and 700cc on the left. POD5 pigtails removed at bedside, and Xarelto resumed. Patient transferred to stepdown unit.  Patient has been progressing well, ambulating in the halls, voiding spontaneously and tolerating PO. Per Dr. Chapman, patient deemed stable for discharge home today.    35 minutes was spent with the patient reviewing the discharge material including medications, follow up appointments, recovery, concerning symptoms, and how to contact their health care providers if they have questions

## 2021-07-21 NOTE — DISCHARGE NOTE NURSING/CASE MANAGEMENT/SOCIAL WORK - NSDCFUADDAPPT_GEN_ALL_CORE_FT
-Please follow up with Dr. Chapman on 7/28/21 @12:15pm.  The office is located at [facility], 4th floor. Call us with any questions #[phone].  - Please also follow up with referring MD, Dr. Morlaes on 7/26/21 @2pm .  -Please follow up with Dr. Galan in 2 weeks.  His office will call with appointment.

## 2021-07-21 NOTE — DISCHARGE NOTE PROVIDER - NSDCFUSCHEDAPPT_GEN_ALL_CORE_FT
BIBI SWEET ; 09/20/2021 ; [facility] Card[facility] [address] BIBI SWEET ; 07/28/2021 ; [facility] CT Surg [address]   BIBI SWEET ; 09/20/2021 ; [facility] Card[facility] [address]

## 2021-07-21 NOTE — DISCHARGE NOTE PROVIDER - CARE PROVIDER_API CALL
Dakota Chapman (MD)  Surgery; Thoracic and Cardiac Surgery  [address]th [place] [address]  Phone: [phone]  Fax: [phone]  Follow Up Time:     Mukul Morales  CARDIOLOGY  [facility] [place] [address]  Phone: [phone]  Fax: [phone]  Follow Up Time:    Dakota Chapman (MD)  Surgery; Thoracic and Cardiac Surgery  [address], [address]th [place] [address]  Phone: [phone]  Fax: [phone]  Scheduled Appointment: 07/28/2021 12:15 PM    Mukul Morales  CARDIOLOGY  [address] [place] [address]  Phone: [phone]  Fax: [phone]  Scheduled Appointment: 07/26/2021 02:00 PM    Trupti Galan)  Cardiac Electrophysiology; Cardiovascular Disease; Internal Medicine  [address] [address]  Phone: [phone]  Fax: [phone]  Follow Up Time: 2 weeks

## 2021-07-21 NOTE — PROGRESS NOTE ADULT - ASSESSMENT
75 year old Male with PMHx of HTN, HLD, Afib (on Xarelto), DM, CKD III, bacterial endocarditis 2015 treated with IV Abx, moderate MR and severe AI who presented to Dr. Chapman for evaluation of valvular disease. Pt reports he is very active, he is an avid bike rider without any symptoms. ECHO 6/1/21: normal EF, moderate to severe LV dilatation, severe AI cannot exclude bicuspid AV, moderate MR. Patient evaluated and deemed a surgical candidate. On 7/13/21 patient underwent  AVR/ MV repair, cryoMaze, ANGELO clip, EF nml. Intraop patient received 1uPRBC, and arrived to CTICU in stable condition, on Epi/Primacor. Patient extubated in short postop period. Epi titrated down to off, primacor weaned, and amiodarone gtt started overnight for Afib w RVR. EPS consulted, Xarelto restarted on POD3, but then was held on POD4 2/2 pleural effusions seen on CXR. POD4 bilateral pigtail pleural catheters placed, with the right draining 1200cc and 700cc on the left. POD5 pigtails removed at bedside, and Xarelto resumed. Patient transferred to stepdown unit.  Patient has been progressing well, ambulating in the halls, voiding spontaneously and tolerating PO. Per Dr. Chapman, patient deemed stable for discharge home today.  
74 yo M with PMHx of HTN, HLD, paroxysmal Afib (on Xarelto 15 mg daily and Sotalol 80 mg bid), DM, CKD III, bacterial endocarditis 2015 treated with IV Abx, moderate MR and severe AI, normal EF on echo, 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   Pt presented in [facility].  Postop on 7/14, pt back to AFIB/typical aflutter. He was started on AMIO bolus/gtt and then PO load 400 mg TID. Asymptomatic.   He self converted on 7/16 afternoon.  He was given Procainamide 1g IV load by team on 7/18 (unclear why; not per EP instruction).    - Back to AF/ aflutter this morning VR 100s.  Hemodynamically stable and pt still asymptomatic.    - Continue Xarelto   - Since he was given procainamide yesterday around 4pm, would wait 24 hours before resuming oral antiarrhythmic drug (ie amio po)   - d/w Dr. Mckenna 
75 year old F, PMHx HTN, HLD, Afib (on Xarelto, last dose 7/10/21), DM, CKD III, bacterial endocarditis 2015 treated with IV Abx, moderate MR and severe AI who presented to Dr. Chapman for evaluation of valvular disease. Pt reports he is very active, he is an avid bike rider without any symptoms. ECHO 6/1/21: normal EF, moderate to severe LV dilatation, severe AI cannot exclude bicuspid AV, moderate MR. On 7/13/21 patient underwent  AVR/ MV repair, cryoMaze, ANGELO clip.  EF NL . OR 1u prbc, arrived to ICU on epi/primacor. Extubated POD#0. chronic afib started on amio gtt?, transitioned to PO load. EP consulted,. heparin gtt started POD#2. xarelto restarted POD#3. Xarelto held POD#4 for effusion. Bilateral pigtials placed POD#5 1200cc on R and 700cc on L. removed and AC restarted. POD#5 transfer to . TOday POD5    A/P:  Neurovascular: No delirium. Pain well controlled with current regimen.  -Tylenol/ percocet prn pain  -Gabapentin 300mg q 12 for pain    Cardiovascular: Hemodynamically stable. HR controlled.  -Hx HTN, HLD, Afib, bacterial endocarditis s/op IV abx, moderate MR and severe AI, now s/p AVR/MV repair, cryo and clip, EF 65%  -continue asa 81mg daily, atorvstatin 40mg qhs, xarelto 15mg qd, metoprolol 12.5mg BID  -EP following for afib- Since he was given procainamide 7/18/12 around 4pm, would wait 24 hours before resuming oral antiarrhythmic drug (ie amio po)   -monitor hr/bp/tele     Respiratory: 02 Sat = 98% RA  -Encourage ambulation, C+DB and Use of IS 10x / hr while awake.  -CXR- mild congestion   s/p b/l pigtails, removed yesterday     GI: Stable.  -protonix for GI PPX.  -Continue bowel regimen  -PO Diet.    Renal / : Hx CKD, baseline Cr 1.5, Peak at 2.5, now 1.84  -NELIA on CKD likely from over diuresis  -gentle diuresis give NELIA improving   -Monitor renal function.  -Monitor I/O's.  -Replete lytes PRN    Endocrine:    -A1c 7- Hx DM- on home orals - continue Lantus 15U QHS, and Humalog 6UTID, ISS     Hematologic: H&H 9.8/29.4  -f/u AM CBC    ID: Afebrile, WBC 10  -Observe for SIRS/Sepsis Syndrome.    Prophylaxis:  -DVT prophylaxis xarelto.  -SCD's    Disposition:  -Home when medically ready     
75 year old F, PMHx HTN, HLD, Afib (on Xarelto, last dose 7/10/21), DM, CKD III, bacterial endocarditis 2015 treated with IV Abx, moderate MR and severe AI who presented to Dr. Chapman for evaluation of valvular disease. Pt reports he is very active, he is an avid bike rider without any symptoms. ECHO 6/1/21: normal EF, moderate to severe LV dilatation, severe AI cannot exclude bicuspid AV, moderate MR. On 7/13/21 patient underwent  AVR/ MV repair, cryoMaze, ANGELO clip.  EF NL . OR 1u prbc, arrived to ICU on epi/primacor. Extubated POD#0. chronic afib started on amio gtt?, transitioned to PO load. EP consulted,. heparin gtt started POD#2. xarelto restarted POD#3. Xarelto held POD#4 for effusion. Bilateral pigtials placed POD#5 1200cc on R and 700cc on L. removed and AC restarted. POD#5 transfer to      A/P:  Neurovascular: No delirium. Pain well controlled with current regimen.  -Tylenol/ percocet prn pain  -Gabapentin 300mg q 12 for pain    Cardiovascular: Hemodynamically stable. HR controlled.  -Hx HTN, HLD, Afib, bacterial endocarditis s/op IV abx, moderate MR and severe AI, now s/p AVR/MV repair, cryo and clip, EF 65%  -continue asa 81mg daily, atorvstatin 40mg qhs, xarelto 15mg qd  -EP following for afib- Since he was given procainamide yesterday around 4pm, would wait 24 hours before resuming oral antiarrhythmic drug (ie amio po)   -monitor hr/bp/tele     Respiratory: 02 Sat = 98% on 4LNC-  -If Wean O2 as tolerated   -Encourage ambulation, C+DB and Use of IS 10x / hr while awake.  -CXR- small left effusion vs atelecatsis \  -B/L pigtail placed yesterday and removed this AM    GI: Stable.  -protonix for GI PPX.  -Continue bowel regimen  -PO Diet.    Renal / : Hx CKD, baseline Cr 1.5, Peak at 2.5, now 1.96  -NELIA on CKD likely from over diuresis  -holding diuresis today, negative 2.4L last 24 hours.   -Monitor renal function.  -Monitor I/O's.  -Replete lytes PRN    Endocrine:    -A1c 7- Hx DM- on home orals - continue Lantus 15U QHS, and Humalog 6UTID, ISS     Hematologic: H&H 9.5/28   -f/u AM CBC    ID: Afebrile, WBC 9  -Observe for SIRS/Sepsis Syndrome.    Prophylaxis:  -DVT prophylaxis xarelto.  -SCD's    Disposition:  -Home when medically ready

## 2021-07-21 NOTE — DISCHARGE NOTE PROVIDER - NSDCCPCAREPLAN_GEN_ALL_CORE_FT
PRINCIPAL DISCHARGE DIAGNOSIS  Diagnosis: History of aortic regurgitation  Assessment and Plan of Treatment:       SECONDARY DISCHARGE DIAGNOSES  Diagnosis: Mitral regurgitation  Assessment and Plan of Treatment:

## 2021-07-21 NOTE — PROGRESS NOTE ADULT - REASON FOR ADMISSION
Cardiac cath

## 2021-07-21 NOTE — DISCHARGE NOTE NURSING/CASE MANAGEMENT/SOCIAL WORK - PATIENT PORTAL LINK FT
You can access the FollowMyHealth Patient Portal offered by [facility] by registering at the following website: [email or web address]. By joining [facility]’s FollowMyHealth portal, you will also be able to view your health information using other applications (apps) compatible with our system.

## 2021-07-21 NOTE — DISCHARGE NOTE PROVIDER - NSDCMRMEDTOKEN_GEN_ALL_CORE_FT
lisinopril 10 mg oral tablet: 1 tab(s) orally once a day  metFORMIN 500 mg oral tablet: 1 tab(s) orally 2 times a day  simvastatin 80 mg oral tablet: 1 tab(s) orally once a day (at bedtime)  sotalol 80 mg oral tablet: 1 tab(s) orally 2 times a day  Vitamin D3 1000 intl units (25 mcg) oral tablet: 1 tab(s) orally once a day  Xarelto 15 mg oral tablet: 1 tab(s) orally once a day (in the evening)   acetaminophen 325 mg oral tablet: 2 tab(s) orally every 6 hours, As Needed -for mild pain   amiodarone 200 mg oral tablet: Take 2 tablets twice a day till July 26th.    From July 27th take one tablet once a day  aspirin 81 mg oral delayed release tablet: 1 tab(s) orally once a day  atorvastatin 40 mg oral tablet: 1 tab(s) orally once a day (at bedtime)  gabapentin 300 mg oral capsule: 1 cap(s) orally every 12 hours MDD:2  metFORMIN 500 mg oral tablet: 1 tab(s) orally 2 times a day  metoprolol tartrate 25 mg oral tablet: 0.5 tab(s) orally every 12 hours   oxyCODONE 5 mg oral tablet: 1 tab(s) orally every 6 hours, As Needed -Moderate Pain (4 - 6) MDD:4  pantoprazole 40 mg oral delayed release tablet: 1 tab(s) orally once a day (before a meal)  polyethylene glycol 3350 oral powder for reconstitution: 17 gram(s) orally once a day, As Needed -for constipation   rivaroxaban 15 mg oral tablet: 1 tab(s) orally once a day (before a meal)  senna oral tablet: 2 tab(s) orally once a day (at bedtime), As Needed -for constipation   Vitamin D3 1000 intl units (25 mcg) oral tablet: 1 tab(s) orally once a day   acetaminophen 325 mg oral tablet: 2 tab(s) orally every 6 hours, As Needed -for mild pain   amiodarone 200 mg oral tablet: Take 2 tablets twice a day till July 26th.    From July 27th take one tablet once a day  aspirin 81 mg oral delayed release tablet: 1 tab(s) orally once a day  atorvastatin 40 mg oral tablet: 1 tab(s) orally once a day (at bedtime)  gabapentin 300 mg oral capsule: 1 cap(s) orally every 12 hours MDD:2  Lasix 20 mg oral tablet: 1 tab(s) orally once a day   metFORMIN 500 mg oral tablet: 1 tab(s) orally 2 times a day  metoprolol tartrate 25 mg oral tablet: 0.5 tab(s) orally every 12 hours   oxyCODONE 5 mg oral tablet: 1 tab(s) orally every 6 hours, As Needed -Moderate Pain (4 - 6) MDD:4  pantoprazole 40 mg oral delayed release tablet: 1 tab(s) orally once a day (before a meal)  polyethylene glycol 3350 oral powder for reconstitution: 17 gram(s) orally once a day, As Needed -for constipation   potassium chloride 10 mEq oral capsule, extended release: 1 cap(s) orally once a day   rivaroxaban 15 mg oral tablet: 1 tab(s) orally once a day (before a meal)  senna oral tablet: 2 tab(s) orally once a day (at bedtime), As Needed -for constipation   Vitamin D3 1000 intl units (25 mcg) oral tablet: 1 tab(s) orally once a day

## 2021-07-21 NOTE — DISCHARGE NOTE PROVIDER - NSDCCPTREATMENT_GEN_ALL_CORE_FT
PRINCIPAL PROCEDURE  Procedure: Aortic valve replacement, bioprosthetic  Findings and Treatment: Mitral valve repair, Maze procedure with pulmonary vein isolation, and let atrial appendage clip

## 2021-07-21 NOTE — DISCHARGE NOTE PROVIDER - NSDCFUADDAPPT_GEN_ALL_CORE_FT
-Please follow up with Dr. Chapman on ___.  The office is located at [facility], [facility], 4th floor. Call us with any questions #[phone].  - Please also follow up with referring MD, Dr. Morales on ___ .   -Please follow up with Dr. Chapman on 7/28/21 @12:15pm.  The office is located at [facility], [facility], 4th floor. Call us with any questions #[phone].  - Please also follow up with referring MD, Dr. Morales on 7/26/21 @2pm .  -Please follow up with Dr. Galan in 2 weeks.  His office will call with appointment.

## 2021-07-21 NOTE — DISCHARGE NOTE PROVIDER - CARE PROVIDERS DIRECT ADDRESSES
[email or web address]

## 2021-07-22 ENCOUNTER — NON-APPOINTMENT (OUTPATIENT)
Age: 76
End: 2021-07-22

## 2021-07-22 RX ORDER — SIMVASTATIN 80 MG/1
80 TABLET, FILM COATED ORAL
Qty: 30 | Refills: 0 | Status: DISCONTINUED | COMMUNITY
End: 2021-07-22

## 2021-07-22 RX ORDER — SENNOSIDES 8.6 MG/1
8.6 TABLET ORAL
Refills: 0 | Status: ACTIVE | COMMUNITY
Start: 2021-07-22

## 2021-07-22 RX ORDER — LISINOPRIL 10 MG/1
10 TABLET ORAL DAILY
Refills: 0 | Status: DISCONTINUED | COMMUNITY
End: 2021-07-22

## 2021-07-22 RX ORDER — SOTALOL HYDROCHLORIDE 80 MG/1
80 TABLET ORAL
Refills: 0 | Status: DISCONTINUED | COMMUNITY
End: 2021-07-22

## 2021-07-23 ENCOUNTER — APPOINTMENT (OUTPATIENT)
Dept: CARE COORDINATION | Facility: HOME HEALTH | Age: 76
End: 2021-07-23
Payer: MEDICARE

## 2021-07-23 PROCEDURE — 99024 POSTOP FOLLOW-UP VISIT: CPT

## 2021-07-23 NOTE — REASON FOR VISIT
[Home] : at home, [unfilled] , at the time of the visit. [Verbal consent obtained from patient] : the patient, [unfilled] [Follow-Up: _____] : a [unfilled] follow-up visit

## 2021-07-23 NOTE — HISTORY OF PRESENT ILLNESS
[FreeTextEntry1] : FOLLOW YOUR HEART\par \par Hospital Course	 \par 75 year old Male with PMHx of HTN, HLD, Afib (on Xarelto), DM, CKD III, \par bacterial endocarditis 2015 treated with IV Abx, moderate MR and severe AI who \par presented to Dr. Chapman for evaluation of valvular disease. Pt reports he is \par very active, he is an avid bike rider without any symptoms. ECHO 6/1/21: normal \par EF, moderate to severe LV dilatation, severe AI cannot exclude bicuspid AV, \par moderate MR. Patient evaluated and deemed a surgical candidate. On 7/13/21 \par patient underwent  AVR/ MV repair, cryoMaze, ANGELO clip, EF nml. Intraop patient \par received 1uPRBC, and arrived to CTICU in stable condition, on Epi/Primacor. \par Patient extubated in short postop period. Epi titrated down to off, primacor \par weaned, and amiodarone gtt started overnight for Afib w RVR. EPS consulted, \par Xarelto restarted on POD3, but then was held on POD4 2/2 pleural effusions seen \par on CXR. POD4 bilateral pigtail pleural catheters placed, with the right \par draining 1200cc and 700cc on the left. POD5 pigtails removed at bedside, and \par Xarelto resumed. Patient transferred to stepdown unit.  Patient has been \par progressing well, ambulating in the halls, voiding spontaneously and tolerating \par PO. Per Dr. Chapman, patient deemed stable for discharge home today. \par \par Seen via telehealth, accompanied by spouse\par Denies fever, SOB, CP or palpitations\par was seen by his cardio Dr. Paiz this AM - no concerns

## 2021-07-23 NOTE — PHYSICAL EXAM
[Pitting Edema] : pitting edema present [___ +] : bilateral pretibial [unfilled]U+ pitting edema [Clean] : clean [Dry] : dry [Healing Well] : healing well [] : no respiratory distress [Respiration, Rhythm And Depth] : normal respiratory rhythm and effort [Abdomen Soft] : soft [Abdomen Tenderness] : non-tender [Oriented To Time, Place, And Person] : oriented to person, place, and time

## 2021-07-23 NOTE — ASSESSMENT
[FreeTextEntry1] : Appears to be progressing well\par Compliant with all meds/post-op cares\par meds reviewed\par ambulating frquently/Using was\par [facility] services in place\par f/u appointments reviewed\par seen by Cards this am - no concerns\par \par Education\par 1.  DC instructions reviewed with patient - discussed importance of medications (indication, schedule, side effects, and importance of compliance reviewed) and f/u appointments.\par 2.  Clean incision sites daily - shower indirectly, clean with soap and water, pat dry.  Avoid the use of lotions, ointments, powders, and perfumes on or around incision sites.\par 3.  Sternal precautions - avoid any heavy lifting (>5-10 lbs x 8 weeks), raising both arms above head simultaneously.\par 4.  Place TEDs on in AM prior to getting out of bed and off in PM when returning to bed.\par 5.  Follow a heart healthy diet - low salt, low fat.\par 6.  Exercise daily.\par 7.  Monitor and call Critical access hospital NP for signs and symptoms of infection (redness, drainage, and fever).\par 8.  Monitor daily weights (call for a gain of 2-3 lbs/day or 5-6 lbs/week). \par 9.  Blood sugar control necessary for wound healing if applicable.\par 10.  Avoid straining during BM - use stool softners as needed. \par 11.  Instructed on importance of incentive spirometry use (10x/hour).\par \par Patient verbalized understanding of all education outlined above. Pt instructed to call Critical access hospital NP for any issues as delineated above.\par \par PLAN\par 1.  Monitor daily weights\par 2.  Continue current medications as prescribed\par 3.  Incentive spirometry use\par 4.  Maintain sternal precautions\par 5.  Exercise daily\par 5.  Maintain HH diet\par 6.  Maintain TEDs\par 7.  Keep legs elevated\par 8.  F/U appointments - \par CTSx Adela 7/28 - pt will call to change appt\par EP Adama TBD\par 9.  FY NP will continue to f/u with patient status - Pt agrees to call with any questions/concerns\par 10. To recover without complications.\par \par

## 2021-07-26 ENCOUNTER — APPOINTMENT (OUTPATIENT)
Dept: HEART AND VASCULAR | Facility: CLINIC | Age: 76
End: 2021-07-26

## 2021-07-27 ENCOUNTER — APPOINTMENT (OUTPATIENT)
Dept: HEART AND VASCULAR | Facility: CLINIC | Age: 76
End: 2021-07-27
Payer: MEDICARE

## 2021-07-27 VITALS
HEIGHT: 67 IN | WEIGHT: 185 LBS | OXYGEN SATURATION: 96 % | TEMPERATURE: 98.9 F | SYSTOLIC BLOOD PRESSURE: 135 MMHG | BODY MASS INDEX: 29.03 KG/M2 | DIASTOLIC BLOOD PRESSURE: 70 MMHG | HEART RATE: 51 BPM

## 2021-07-27 PROCEDURE — 99213 OFFICE O/P EST LOW 20 MIN: CPT

## 2021-07-27 RX ORDER — POTASSIUM CHLORIDE 750 MG/1
10 CAPSULE, EXTENDED RELEASE ORAL
Qty: 3 | Refills: 0 | Status: ACTIVE | COMMUNITY
Start: 2021-07-21

## 2021-07-27 RX ORDER — GABAPENTIN 300 MG/1
300 CAPSULE ORAL TWICE DAILY
Refills: 0 | Status: ACTIVE | COMMUNITY
Start: 2021-07-22

## 2021-07-27 RX ORDER — AMIODARONE HYDROCHLORIDE 200 MG/1
200 TABLET ORAL DAILY
Refills: 0 | Status: ACTIVE | COMMUNITY
Start: 2021-07-22

## 2021-07-27 RX ORDER — METFORMIN HYDROCHLORIDE 500 MG/1
500 TABLET, COATED ORAL TWICE DAILY
Refills: 0 | Status: ACTIVE | COMMUNITY

## 2021-07-27 RX ORDER — PANTOPRAZOLE 40 MG/1
40 TABLET, DELAYED RELEASE ORAL DAILY
Refills: 0 | Status: ACTIVE | COMMUNITY
Start: 2021-07-22

## 2021-07-27 RX ORDER — RIVAROXABAN 15 MG/1
15 TABLET, FILM COATED ORAL DAILY
Refills: 0 | Status: ACTIVE | COMMUNITY

## 2021-07-27 RX ORDER — FUROSEMIDE 40 MG/1
40 TABLET ORAL DAILY
Refills: 0 | Status: ACTIVE | COMMUNITY
Start: 2021-07-22

## 2021-07-27 RX ORDER — ATORVASTATIN CALCIUM 40 MG/1
40 TABLET, FILM COATED ORAL DAILY
Refills: 0 | Status: ACTIVE | COMMUNITY
Start: 2021-07-22

## 2021-07-27 RX ORDER — ASPIRIN ENTERIC COATED TABLETS 81 MG 81 MG/1
81 TABLET, DELAYED RELEASE ORAL DAILY
Refills: 0 | Status: ACTIVE | COMMUNITY
Start: 2021-07-22

## 2021-07-27 RX ORDER — METOPROLOL TARTRATE 25 MG/1
25 TABLET, FILM COATED ORAL
Refills: 0 | Status: ACTIVE | COMMUNITY
Start: 2021-07-22

## 2021-07-28 ENCOUNTER — APPOINTMENT (OUTPATIENT)
Dept: CARDIOTHORACIC SURGERY | Facility: CLINIC | Age: 76
End: 2021-07-28
Payer: MEDICARE

## 2021-07-28 DIAGNOSIS — E78.5 HYPERLIPIDEMIA, UNSPECIFIED: ICD-10-CM

## 2021-07-28 DIAGNOSIS — I48.3 TYPICAL ATRIAL FLUTTER: ICD-10-CM

## 2021-07-28 DIAGNOSIS — Z79.84 LONG TERM (CURRENT) USE OF ORAL HYPOGLYCEMIC DRUGS: ICD-10-CM

## 2021-07-28 DIAGNOSIS — E11.22 TYPE 2 DIABETES MELLITUS WITH DIABETIC CHRONIC KIDNEY DISEASE: ICD-10-CM

## 2021-07-28 DIAGNOSIS — D62 ACUTE POSTHEMORRHAGIC ANEMIA: ICD-10-CM

## 2021-07-28 DIAGNOSIS — N17.9 ACUTE KIDNEY FAILURE, UNSPECIFIED: ICD-10-CM

## 2021-07-28 DIAGNOSIS — J98.11 ATELECTASIS: ICD-10-CM

## 2021-07-28 DIAGNOSIS — I08.0 RHEUMATIC DISORDERS OF BOTH MITRAL AND AORTIC VALVES: ICD-10-CM

## 2021-07-28 DIAGNOSIS — I48.20 CHRONIC ATRIAL FIBRILLATION, UNSPECIFIED: ICD-10-CM

## 2021-07-28 DIAGNOSIS — I25.10 ATHEROSCLEROTIC HEART DISEASE OF NATIVE CORONARY ARTERY WITHOUT ANGINA PECTORIS: ICD-10-CM

## 2021-07-28 DIAGNOSIS — I13.10 HYPERTENSIVE HEART AND CHRONIC KIDNEY DISEASE WITHOUT HEART FAILURE, WITH STAGE 1 THROUGH STAGE 4 CHRONIC KIDNEY DISEASE, OR UNSPECIFIED CHRONIC KIDNEY DISEASE: ICD-10-CM

## 2021-07-28 DIAGNOSIS — Z79.01 LONG TERM (CURRENT) USE OF ANTICOAGULANTS: ICD-10-CM

## 2021-07-28 DIAGNOSIS — J90 PLEURAL EFFUSION, NOT ELSEWHERE CLASSIFIED: ICD-10-CM

## 2021-07-28 DIAGNOSIS — N18.30 CHRONIC KIDNEY DISEASE, STAGE 3 UNSPECIFIED: ICD-10-CM

## 2021-07-28 DIAGNOSIS — R00.1 BRADYCARDIA, UNSPECIFIED: ICD-10-CM

## 2021-07-28 DIAGNOSIS — E87.2 ACIDOSIS: ICD-10-CM

## 2021-07-28 PROCEDURE — 99024 POSTOP FOLLOW-UP VISIT: CPT

## 2021-07-29 RX ORDER — POLYETHYLENE GLYCOL 3350 17 G/17G
17 POWDER, FOR SOLUTION ORAL
Refills: 0 | Status: DISCONTINUED | COMMUNITY
Start: 2021-07-22 | End: 2021-07-29

## 2021-07-29 RX ORDER — OXYCODONE 5 MG/1
5 TABLET ORAL
Refills: 0 | Status: DISCONTINUED | COMMUNITY
Start: 2021-07-22 | End: 2021-07-29

## 2021-08-01 LAB — SURGICAL PATHOLOGY STUDY: SIGNIFICANT CHANGE UP

## 2021-08-19 ENCOUNTER — TRANSCRIPTION ENCOUNTER (OUTPATIENT)
Age: 76
End: 2021-08-19

## 2021-08-27 ENCOUNTER — APPOINTMENT (OUTPATIENT)
Dept: CARDIOTHORACIC SURGERY | Facility: CLINIC | Age: 76
End: 2021-08-27
Payer: MEDICARE

## 2021-08-27 VITALS
HEART RATE: 70 BPM | WEIGHT: 172 LBS | TEMPERATURE: 98.6 F | OXYGEN SATURATION: 98 % | HEIGHT: 67 IN | BODY MASS INDEX: 27 KG/M2 | DIASTOLIC BLOOD PRESSURE: 66 MMHG | SYSTOLIC BLOOD PRESSURE: 116 MMHG

## 2021-08-27 DIAGNOSIS — Z95.3 PRESENCE OF XENOGENIC HEART VALVE: ICD-10-CM

## 2021-08-27 DIAGNOSIS — Z98.890 OTHER SPECIFIED POSTPROCEDURAL STATES: ICD-10-CM

## 2021-08-27 PROCEDURE — 99024 POSTOP FOLLOW-UP VISIT: CPT

## 2021-08-31 PROBLEM — Z98.890 S/P MVR (MITRAL VALVE REPAIR): Status: ACTIVE | Noted: 2021-07-23

## 2021-08-31 PROBLEM — Z95.3 S/P AORTIC VALVE REPLACEMENT WITH BIOPROSTHETIC VALVE: Status: ACTIVE | Noted: 2021-07-26

## 2021-09-28 ENCOUNTER — APPOINTMENT (OUTPATIENT)
Dept: HEART AND VASCULAR | Facility: CLINIC | Age: 76
End: 2021-09-28
Payer: MEDICARE

## 2021-09-28 VITALS
BODY MASS INDEX: 27.47 KG/M2 | DIASTOLIC BLOOD PRESSURE: 90 MMHG | SYSTOLIC BLOOD PRESSURE: 140 MMHG | WEIGHT: 175 LBS | HEIGHT: 67 IN | HEART RATE: 120 BPM | OXYGEN SATURATION: 98 %

## 2021-09-28 DIAGNOSIS — Z09 ENCOUNTER FOR FOLLOW-UP EXAMINATION AFTER COMPLETED TREATMENT FOR CONDITIONS OTHER THAN MALIGNANT NEOPLASM: ICD-10-CM

## 2021-09-28 PROCEDURE — 99214 OFFICE O/P EST MOD 30 MIN: CPT

## 2021-09-30 PROBLEM — Z09 POSTOP CHECK: Status: RESOLVED | Noted: 2021-07-26 | Resolved: 2021-09-30

## 2021-09-30 NOTE — REVIEW OF SYSTEMS
[Fever] : no fever [Chills] : no chills [SOB] : no shortness of breath [Dyspnea on exertion] : not dyspnea during exertion [Chest Discomfort] : no chest discomfort [Palpitations] : no palpitations [Syncope] : no syncope [Abdominal Pain] : no abdominal pain [Nausea] : no nausea [Vomiting] : no vomiting

## 2021-09-30 NOTE — HISTORY OF PRESENT ILLNESS
[FreeTextEntry1] : 77 yo male with hypertension, hyperlipidemia, atrial fibrillation, diabetes, CKD, bacterial endocarditis in 2016 with resulting valve disease, who underwent AVR, MV repair, CryoMaze, and ANGELO clip in July 2021.  He had some post-operative atrial fibrillation and was treated with amiodarone, and Xarelto was restarted.\par He has normal LV function on echo.\par He had recurrent atrial flutter and underwent cardioversion recently, but atrial flutter has recurred.  He reports feeling relatively ok.  He noticed an elevated heart rate.  He denies palpitations, dizziness, or syncope.  He remains pretty active.  He remains on amiodarone.\par He presents today to discuss further options for management of his arrhythmia.

## 2021-09-30 NOTE — REASON FOR VISIT
[Arrhythmia/ECG Abnorrmalities] : arrhythmia/ECG abnormalities [FreeTextEntry3] : Dr. Morales Benzoyl Peroxide Counseling: Patient counseled that medicine may cause skin irritation and bleach clothing.  In the event of skin irritation, the patient was advised to reduce the amount of the drug applied or use it less frequently.   The patient verbalized understanding of the proper use and possible adverse effects of benzoyl peroxide.  All of the patient's questions and concerns were addressed.

## 2021-09-30 NOTE — PHYSICAL EXAM
[Well Developed] : well developed [Well Nourished] : well nourished [No Acute Distress] : no acute distress [Normal Venous Pressure] : normal venous pressure [Normal S1, S2] : normal S1, S2 [No Murmur] : no murmur [No Gallop] : no gallop [Clear Lung Fields] : clear lung fields [No Respiratory Distress] : no respiratory distress  [Soft] : abdomen soft [Non Tender] : non-tender [Normal Gait] : normal gait [No Edema] : no edema [de-identified] : mildly tachycardic

## 2021-10-06 PROBLEM — I10 ESSENTIAL HYPERTENSION: Status: ACTIVE | Noted: 2019-11-12

## 2021-10-13 ENCOUNTER — INPATIENT (INPATIENT)
Facility: HOSPITAL | Age: 76
LOS: 0 days | Discharge: HOME | DRG: 274 | End: 2021-10-14
Attending: INTERNAL MEDICINE | Admitting: INTERNAL MEDICINE
Payer: COMMERCIAL

## 2021-10-13 VITALS — HEIGHT: 67 IN | WEIGHT: 177.91 LBS

## 2021-10-13 DIAGNOSIS — Z98.890 OTHER SPECIFIED POSTPROCEDURAL STATES: Chronic | ICD-10-CM

## 2021-10-13 DIAGNOSIS — Z90.89 ACQUIRED ABSENCE OF OTHER ORGANS: Chronic | ICD-10-CM

## 2021-10-13 DIAGNOSIS — I48.92 UNSPECIFIED ATRIAL FLUTTER: ICD-10-CM

## 2021-10-13 LAB
ANION GAP SERPL CALC-SCNC: 11 MMOL/L — SIGNIFICANT CHANGE UP (ref 5–17)
APTT BLD: 48.8 SEC — HIGH (ref 27.5–35.5)
BLD GP AB SCN SERPL QL: NEGATIVE — SIGNIFICANT CHANGE UP
BUN SERPL-MCNC: 31 MG/DL — HIGH (ref 7–23)
CALCIUM SERPL-MCNC: 9.4 MG/DL — SIGNIFICANT CHANGE UP (ref 8.4–10.5)
CHLORIDE SERPL-SCNC: 104 MMOL/L — SIGNIFICANT CHANGE UP (ref 96–108)
CO2 SERPL-SCNC: 26 MMOL/L — SIGNIFICANT CHANGE UP (ref 22–31)
CREAT SERPL-MCNC: 1.56 MG/DL — HIGH (ref 0.5–1.3)
GLUCOSE BLDC GLUCOMTR-MCNC: 119 MG/DL — HIGH (ref 70–99)
GLUCOSE BLDC GLUCOMTR-MCNC: 156 MG/DL — HIGH (ref 70–99)
GLUCOSE BLDC GLUCOMTR-MCNC: 238 MG/DL — HIGH (ref 70–99)
GLUCOSE SERPL-MCNC: 176 MG/DL — HIGH (ref 70–99)
HCT VFR BLD CALC: 36.6 % — LOW (ref 39–50)
HGB BLD-MCNC: 12.4 G/DL — LOW (ref 13–17)
INR BLD: 1.38 — HIGH (ref 0.88–1.16)
MCHC RBC-ENTMCNC: 29.5 PG — SIGNIFICANT CHANGE UP (ref 27–34)
MCHC RBC-ENTMCNC: 33.9 GM/DL — SIGNIFICANT CHANGE UP (ref 32–36)
MCV RBC AUTO: 86.9 FL — SIGNIFICANT CHANGE UP (ref 80–100)
NRBC # BLD: 0 /100 WBCS — SIGNIFICANT CHANGE UP (ref 0–0)
PLATELET # BLD AUTO: 207 K/UL — SIGNIFICANT CHANGE UP (ref 150–400)
POTASSIUM SERPL-MCNC: 4.5 MMOL/L — SIGNIFICANT CHANGE UP (ref 3.5–5.3)
POTASSIUM SERPL-SCNC: 4.5 MMOL/L — SIGNIFICANT CHANGE UP (ref 3.5–5.3)
PROTHROM AB SERPL-ACNC: 16.3 SEC — HIGH (ref 10.6–13.6)
RBC # BLD: 4.21 M/UL — SIGNIFICANT CHANGE UP (ref 4.2–5.8)
RBC # FLD: 13.7 % — SIGNIFICANT CHANGE UP (ref 10.3–14.5)
RH IG SCN BLD-IMP: POSITIVE — SIGNIFICANT CHANGE UP
SODIUM SERPL-SCNC: 141 MMOL/L — SIGNIFICANT CHANGE UP (ref 135–145)
WBC # BLD: 5.63 K/UL — SIGNIFICANT CHANGE UP (ref 3.8–10.5)
WBC # FLD AUTO: 5.63 K/UL — SIGNIFICANT CHANGE UP (ref 3.8–10.5)

## 2021-10-13 RX ORDER — CHOLECALCIFEROL (VITAMIN D3) 125 MCG
1 CAPSULE ORAL
Qty: 0 | Refills: 0 | DISCHARGE

## 2021-10-13 RX ORDER — METFORMIN HYDROCHLORIDE 850 MG/1
1 TABLET ORAL
Qty: 0 | Refills: 0 | DISCHARGE

## 2021-10-13 RX ORDER — METOPROLOL TARTRATE 50 MG
50 TABLET ORAL
Refills: 0 | Status: DISCONTINUED | OUTPATIENT
Start: 2021-10-13 | End: 2021-10-14

## 2021-10-13 RX ORDER — RIVAROXABAN 15 MG-20MG
15 KIT ORAL DAILY
Refills: 0 | Status: DISCONTINUED | OUTPATIENT
Start: 2021-10-13 | End: 2021-10-14

## 2021-10-13 RX ORDER — INFLUENZA VIRUS VACCINE 15; 15; 15; 15 UG/.5ML; UG/.5ML; UG/.5ML; UG/.5ML
0.5 SUSPENSION INTRAMUSCULAR ONCE
Refills: 0 | Status: DISCONTINUED | OUTPATIENT
Start: 2021-10-13 | End: 2021-10-13

## 2021-10-13 RX ORDER — METOPROLOL TARTRATE 50 MG
1 TABLET ORAL
Qty: 0 | Refills: 0 | DISCHARGE

## 2021-10-13 RX ORDER — ASPIRIN/CALCIUM CARB/MAGNESIUM 324 MG
81 TABLET ORAL DAILY
Refills: 0 | Status: DISCONTINUED | OUTPATIENT
Start: 2021-10-13 | End: 2021-10-14

## 2021-10-13 RX ORDER — PANTOPRAZOLE SODIUM 20 MG/1
1 TABLET, DELAYED RELEASE ORAL
Qty: 0 | Refills: 0 | DISCHARGE

## 2021-10-13 RX ORDER — DEXTROSE 50 % IN WATER 50 %
25 SYRINGE (ML) INTRAVENOUS ONCE
Refills: 0 | Status: DISCONTINUED | OUTPATIENT
Start: 2021-10-13 | End: 2021-10-14

## 2021-10-13 RX ORDER — LEVOTHYROXINE SODIUM 125 MCG
0 TABLET ORAL
Qty: 0 | Refills: 0 | DISCHARGE

## 2021-10-13 RX ORDER — ASPIRIN/CALCIUM CARB/MAGNESIUM 324 MG
0 TABLET ORAL
Qty: 0 | Refills: 0 | DISCHARGE

## 2021-10-13 RX ORDER — FONDAPARINUX SODIUM 2.5 MG/.5ML
1 INJECTION, SOLUTION SUBCUTANEOUS
Qty: 0 | Refills: 0 | DISCHARGE

## 2021-10-13 RX ORDER — SODIUM CHLORIDE 9 MG/ML
1000 INJECTION, SOLUTION INTRAVENOUS
Refills: 0 | Status: DISCONTINUED | OUTPATIENT
Start: 2021-10-13 | End: 2021-10-14

## 2021-10-13 RX ORDER — DEXTROSE 50 % IN WATER 50 %
15 SYRINGE (ML) INTRAVENOUS ONCE
Refills: 0 | Status: DISCONTINUED | OUTPATIENT
Start: 2021-10-13 | End: 2021-10-14

## 2021-10-13 RX ORDER — ATORVASTATIN CALCIUM 80 MG/1
40 TABLET, FILM COATED ORAL AT BEDTIME
Refills: 0 | Status: DISCONTINUED | OUTPATIENT
Start: 2021-10-13 | End: 2021-10-14

## 2021-10-13 RX ORDER — AMIODARONE HYDROCHLORIDE 400 MG/1
200 TABLET ORAL DAILY
Refills: 0 | Status: DISCONTINUED | OUTPATIENT
Start: 2021-10-13 | End: 2021-10-14

## 2021-10-13 RX ORDER — INFLUENZA VIRUS VACCINE 15; 15; 15; 15 UG/.5ML; UG/.5ML; UG/.5ML; UG/.5ML
0.7 SUSPENSION INTRAMUSCULAR ONCE
Refills: 0 | Status: DISCONTINUED | OUTPATIENT
Start: 2021-10-13 | End: 2021-10-14

## 2021-10-13 RX ORDER — GLUCAGON INJECTION, SOLUTION 0.5 MG/.1ML
1 INJECTION, SOLUTION SUBCUTANEOUS ONCE
Refills: 0 | Status: DISCONTINUED | OUTPATIENT
Start: 2021-10-13 | End: 2021-10-14

## 2021-10-13 RX ORDER — DEXTROSE 50 % IN WATER 50 %
12.5 SYRINGE (ML) INTRAVENOUS ONCE
Refills: 0 | Status: DISCONTINUED | OUTPATIENT
Start: 2021-10-13 | End: 2021-10-14

## 2021-10-13 RX ORDER — INSULIN LISPRO 100/ML
VIAL (ML) SUBCUTANEOUS
Refills: 0 | Status: DISCONTINUED | OUTPATIENT
Start: 2021-10-13 | End: 2021-10-14

## 2021-10-13 RX ORDER — AMIODARONE HYDROCHLORIDE 400 MG/1
1 TABLET ORAL
Qty: 0 | Refills: 0 | DISCHARGE

## 2021-10-13 RX ORDER — PANTOPRAZOLE SODIUM 20 MG/1
40 TABLET, DELAYED RELEASE ORAL
Refills: 0 | Status: DISCONTINUED | OUTPATIENT
Start: 2021-10-13 | End: 2021-10-14

## 2021-10-13 RX ADMIN — Medication 2: at 21:30

## 2021-10-13 RX ADMIN — RIVAROXABAN 15 MILLIGRAM(S): KIT at 17:37

## 2021-10-13 RX ADMIN — Medication 50 MILLIGRAM(S): at 17:37

## 2021-10-13 NOTE — CHART NOTE - NSCHARTNOTEFT_GEN_A_CORE
BIBI SWEET  [id number]    PROCEDURE:  Typical atrial flutter ablation    INDICATION:  Typical atrial flutter    ELECTROPHYSIOLOGIST(S):  MD Percy Jack MD (fellow)    ANESTHESIOLOGY:  GA    FINDINGS:  Successful RF ablation of typical atrial flutter    COMPLICATIONS:  None    RECOMMENDATIONS:  Can stop amiodarone  Resume xarelto tonight

## 2021-10-13 NOTE — H&P ADULT - ASSESSMENT
77 yo male with history of  hypertension, hyperlipidemia, atrial fibrillation, diabetes, CKD, bacterial endocarditis in 2016 with resulting valve disease, who underwent AVR, MV repair, CryoMaze, and ANGELO clip in July 2021.  He had some post-operative atrial fibrillation and was treated with amiodarone, complaint with  Xarelto , s/p DCCV  presents for scheduled ablation of atrial flutter.

## 2021-10-13 NOTE — H&P ADULT - HISTORY OF PRESENT ILLNESS
77 yo male with history of  hypertension, hyperlipidemia, atrial fibrillation, diabetes, CKD, bacterial endocarditis in 2016 with resulting valve disease, who underwent AVR, MV repair, CryoMaze, and ANGELO clip in July 2021.  He had some post-operative atrial fibrillation and was treated with amiodarone, complaint with  Xarelto , s/p DCCV  presents for scheduled ablation of atrial flutter.   He had recurrent atrial flutter and underwent cardioversion recently, but atrial flutter has recurred.  He reports feeling relatively ok.  He noticed an elevated heart rate.  He denies palpitations, dizziness, or syncope.  He remains pretty active.  He remains on amiodarone.  normal LV function on echo.

## 2021-10-13 NOTE — PHYSICAL EXAM
[Well Developed] : well developed [Well Nourished] : well nourished [No Acute Distress] : no acute distress [Normal Venous Pressure] : normal venous pressure [Normal S1, S2] : normal S1, S2 [No Murmur] : no murmur [No Gallop] : no gallop [Clear Lung Fields] : clear lung fields [No Respiratory Distress] : no respiratory distress  [Non Tender] : non-tender [Soft] : abdomen soft [Normal Gait] : normal gait [No Edema] : no edema [de-identified] : mildly tachycardic

## 2021-10-14 ENCOUNTER — TRANSCRIPTION ENCOUNTER (OUTPATIENT)
Age: 76
End: 2021-10-14

## 2021-10-14 VITALS — TEMPERATURE: 97 F

## 2021-10-14 LAB
A1C WITH ESTIMATED AVERAGE GLUCOSE RESULT: 7.2 % — HIGH (ref 4–5.6)
COVID-19 SPIKE DOMAIN AB INTERP: POSITIVE
COVID-19 SPIKE DOMAIN ANTIBODY RESULT: >250 U/ML — HIGH
ESTIMATED AVERAGE GLUCOSE: 160 MG/DL — HIGH (ref 68–114)
GLUCOSE BLDC GLUCOMTR-MCNC: 142 MG/DL — HIGH (ref 70–99)
HCV AB S/CO SERPL IA: 0.04 S/CO — SIGNIFICANT CHANGE UP
HCV AB SERPL-IMP: SIGNIFICANT CHANGE UP
SARS-COV-2 IGG+IGM SERPL QL IA: >250 U/ML — HIGH
SARS-COV-2 IGG+IGM SERPL QL IA: POSITIVE

## 2021-10-14 RX ORDER — ATORVASTATIN CALCIUM 80 MG/1
1 TABLET, FILM COATED ORAL
Qty: 0 | Refills: 0 | DISCHARGE
Start: 2021-10-14

## 2021-10-14 RX ADMIN — PANTOPRAZOLE SODIUM 40 MILLIGRAM(S): 20 TABLET, DELAYED RELEASE ORAL at 05:48

## 2021-10-14 RX ADMIN — AMIODARONE HYDROCHLORIDE 200 MILLIGRAM(S): 400 TABLET ORAL at 05:49

## 2021-10-14 RX ADMIN — Medication 50 MILLIGRAM(S): at 09:16

## 2021-10-14 NOTE — DISCHARGE NOTE PROVIDER - NSDCFUADDAPPT_GEN_ALL_CORE_FT
Follow up Dr. Parks, call his office [phone] for appointment or if you have questions.   Avoid heavy lifting, exercise and strenuous activity for 10 days.  Showering is ok.

## 2021-10-14 NOTE — DISCHARGE NOTE NURSING/CASE MANAGEMENT/SOCIAL WORK - PATIENT PORTAL LINK FT
You can access the FollowMyHealth Patient Portal offered by [facility] by registering at the following website: [email or web address]. By joining [email or web address]’s FollowMyHealth portal, you will also be able to view your health information using other applications (apps) compatible with our system.

## 2021-10-14 NOTE — DISCHARGE NOTE NURSING/CASE MANAGEMENT/SOCIAL WORK - NSDCFUADDAPPT_GEN_ALL_CORE_FT
Follow up Dr. Parks, call his office [phone] for appointment or if you have questions.   Avoid heavy lifting, exercise and strenuous activity for 10 days.  Showering is ok.

## 2021-10-14 NOTE — DISCHARGE NOTE PROVIDER - NSDCMRMEDTOKEN_GEN_ALL_CORE_FT
amiodarone 200 mg oral tablet: 1 tab(s) orally once a day  aspirin 81 mg oral tablet:   atorvastatin 40 mg oral tablet: 1 tab(s) orally once a day (at bedtime)  levothyroxine:   metFORMIN 500 mg oral tablet: 1 tab(s) orally 3 times a day  Metoprolol Tartrate 50 mg oral tablet: 1 tab(s) orally 2 times a day  pantoprazole 40 mg oral delayed release tablet: 1 tab(s) orally once a day  Xarelto 15 mg oral tablet: 1 tab(s) orally once a day (in the evening)

## 2021-10-14 NOTE — DISCHARGE NOTE PROVIDER - CARE PROVIDER_API CALL
Irina Lehman)  Cardiac Electrophysiology; Cardiovascular Disease  [address] [place] [address]  Phone: [phone]  Fax: [phone]  Follow Up Time:

## 2021-10-14 NOTE — DISCHARGE NOTE PROVIDER - NSDCCPTREATMENT_GEN_ALL_CORE_FT
PRINCIPAL PROCEDURE  Procedure: Ablation of arrhythmogenic focus for atrial flutter with anesthesia  Findings and Treatment:

## 2021-10-20 DIAGNOSIS — I48.92 UNSPECIFIED ATRIAL FLUTTER: ICD-10-CM

## 2021-10-20 DIAGNOSIS — E11.22 TYPE 2 DIABETES MELLITUS WITH DIABETIC CHRONIC KIDNEY DISEASE: ICD-10-CM

## 2021-10-20 DIAGNOSIS — Z79.01 LONG TERM (CURRENT) USE OF ANTICOAGULANTS: ICD-10-CM

## 2021-10-20 DIAGNOSIS — Z95.4 PRESENCE OF OTHER HEART-VALVE REPLACEMENT: ICD-10-CM

## 2021-10-20 DIAGNOSIS — Z79.84 LONG TERM (CURRENT) USE OF ORAL HYPOGLYCEMIC DRUGS: ICD-10-CM

## 2021-10-20 DIAGNOSIS — I48.91 UNSPECIFIED ATRIAL FIBRILLATION: ICD-10-CM

## 2021-10-20 DIAGNOSIS — N18.30 CHRONIC KIDNEY DISEASE, STAGE 3 UNSPECIFIED: ICD-10-CM

## 2021-10-20 DIAGNOSIS — Z79.82 LONG TERM (CURRENT) USE OF ASPIRIN: ICD-10-CM

## 2021-10-20 DIAGNOSIS — E78.5 HYPERLIPIDEMIA, UNSPECIFIED: ICD-10-CM

## 2021-10-20 DIAGNOSIS — N18.9 CHRONIC KIDNEY DISEASE, UNSPECIFIED: ICD-10-CM

## 2021-10-20 DIAGNOSIS — I12.9 HYPERTENSIVE CHRONIC KIDNEY DISEASE WITH STAGE 1 THROUGH STAGE 4 CHRONIC KIDNEY DISEASE, OR UNSPECIFIED CHRONIC KIDNEY DISEASE: ICD-10-CM

## 2021-10-26 ENCOUNTER — APPOINTMENT (OUTPATIENT)
Dept: HEART AND VASCULAR | Facility: CLINIC | Age: 76
End: 2021-10-26

## 2021-10-26 PROCEDURE — 80048 BASIC METABOLIC PNL TOTAL CA: CPT

## 2021-10-26 PROCEDURE — C1759: CPT

## 2021-10-26 PROCEDURE — 93613 INTRACARDIAC EPHYS 3D MAPG: CPT

## 2021-10-26 PROCEDURE — 86900 BLOOD TYPING SEROLOGIC ABO: CPT

## 2021-10-26 PROCEDURE — 86850 RBC ANTIBODY SCREEN: CPT

## 2021-10-26 PROCEDURE — 86769 SARS-COV-2 COVID-19 ANTIBODY: CPT

## 2021-10-26 PROCEDURE — 93355 ECHO TRANSESOPHAGEAL (TEE): CPT

## 2021-10-26 PROCEDURE — 93656 COMPRE EP EVAL ABLTJ ATR FIB: CPT

## 2021-10-26 PROCEDURE — C2630: CPT

## 2021-10-26 PROCEDURE — 83036 HEMOGLOBIN GLYCOSYLATED A1C: CPT

## 2021-10-26 PROCEDURE — 85730 THROMBOPLASTIN TIME PARTIAL: CPT

## 2021-10-26 PROCEDURE — 86901 BLOOD TYPING SEROLOGIC RH(D): CPT

## 2021-10-26 PROCEDURE — 93657 TX L/R ATRIAL FIB ADDL: CPT

## 2021-10-26 PROCEDURE — C1730: CPT

## 2021-10-26 PROCEDURE — C1894: CPT

## 2021-10-26 PROCEDURE — 85027 COMPLETE CBC AUTOMATED: CPT

## 2021-10-26 PROCEDURE — C1766: CPT

## 2021-10-26 PROCEDURE — 82962 GLUCOSE BLOOD TEST: CPT

## 2021-10-26 PROCEDURE — 86803 HEPATITIS C AB TEST: CPT

## 2021-10-26 PROCEDURE — C1760: CPT

## 2021-10-26 PROCEDURE — 36415 COLL VENOUS BLD VENIPUNCTURE: CPT

## 2021-10-26 PROCEDURE — 85610 PROTHROMBIN TIME: CPT

## 2021-10-26 PROCEDURE — 93653 COMPRE EP EVAL TX SVT: CPT

## 2021-11-09 ENCOUNTER — APPOINTMENT (OUTPATIENT)
Dept: HEART AND VASCULAR | Facility: CLINIC | Age: 76
End: 2021-11-09
Payer: MEDICARE

## 2021-11-09 DIAGNOSIS — I48.92 UNSPECIFIED ATRIAL FLUTTER: ICD-10-CM

## 2021-11-09 DIAGNOSIS — I48.0 PAROXYSMAL ATRIAL FIBRILLATION: ICD-10-CM

## 2021-11-09 PROCEDURE — 99214 OFFICE O/P EST MOD 30 MIN: CPT

## 2021-11-10 ENCOUNTER — NON-APPOINTMENT (OUTPATIENT)
Age: 76
End: 2021-11-10

## 2022-01-24 PROBLEM — I48.0 AF (PAROXYSMAL ATRIAL FIBRILLATION): Status: ACTIVE | Noted: 2019-11-12

## 2022-01-24 PROBLEM — I48.92 ATRIAL FLUTTER: Status: ACTIVE | Noted: 2021-09-30

## 2022-01-24 NOTE — PHYSICAL EXAM
[Well Developed] : well developed [Well Nourished] : well nourished [No Acute Distress] : no acute distress [Normal Venous Pressure] : normal venous pressure [Normal S1, S2] : normal S1, S2 [No Murmur] : no murmur [No Gallop] : no gallop [Clear Lung Fields] : clear lung fields [No Respiratory Distress] : no respiratory distress  [Soft] : abdomen soft [Non Tender] : non-tender [Normal Gait] : normal gait [No Edema] : no edema

## 2022-01-24 NOTE — HISTORY OF PRESENT ILLNESS
[FreeTextEntry1] : 75 yo male with hypertension, hyperlipidemia, atrial fibrillation, diabetes, CKD, bacterial endocarditis in 2016 with resulting valve disease, who underwent AVR, MV repair, CryoMaze, and ANGELO clip in July 2021. He had some post-operative atrial fibrillation and was treated with amiodarone, and Xarelto was restarted.\par He has normal LV function on echo.\par He had recurrent atrial flutter and had been on amiodarone.  His pulse rate came back to normal.  He had recurrent atrial flutter and underwent cardioversion recently, but atrial flutter again recurred. He reports feeling relatively ok. He noticed an elevated heart rate. He denies palpitations, dizziness, or syncope.\par He underwent atrial flutter ablation at [facility] in October 2021.  Atrial flutter was typical, so he underwent CTI ablation.  No other atrial flutter or atrial arrhythmia could be induced.\par He returns for follow-up.  He notes that his pulse has been steady.  He feels more energy.

## 2023-01-30 NOTE — END OF VISIT
[FreeTextEntry3] : \par I, LEILANI RAYMUNDOU , am scribing for and in the presence of DERICK LEON the following sections: History of present illness, past Medical/family/surgical/family/social history, review of systems, vital signs, physical exam and disposition.\par \par I personally performed the services described in the documentation, reviewed the documentation recorded by the scribe in my presence and it accurately and completely records my words and actions.\par  DISPLAY PLAN FREE TEXT

## 2024-05-13 NOTE — DISCHARGE NOTE PROVIDER - HOSPITAL COURSE
Patient has been added to the wait list.    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  He had some post-operative atrial fibrillation and was treated with amiodarone, complaint with  Xarelto , s/p DCCV  presents for scheduled ablation of atrial flutter.   Patient had successful ablation of atrial flutter , there were no complications. Bl groin check no bleeding, no swelling, no hematoma  Patient maintaining sinus rhythm.  Patient was stable for discharge.

## 2024-05-26 NOTE — PHYSICAL THERAPY INITIAL EVALUATION ADULT - SKIN INTEGRITY
MSI CDI/surgical incision Pt feeling better after ER stay, stress negative for acute pathology. stable for dc.

## 2024-08-18 NOTE — PROCEDURE NOTE - NSPOSTCAREGUIDE_GEN_A_CORE
Verbal/written post procedure instructions were given to patient/caregiver
Name band;
Verbal/written post procedure instructions were given to patient/caregiver

## 2025-06-13 NOTE — PHYSICAL THERAPY INITIAL EVALUATION ADULT - LEVEL OF CONSCIOUSNESS, REHAB EVAL
Appointment has been scheduled for the patient as follows:    Date: 10/10/2025     Time: 3:00 PM     Visit Type: FOLLOW UP PG      Provider: Florencio Juárez MD [facility]      alert